# Patient Record
Sex: FEMALE | Race: WHITE | NOT HISPANIC OR LATINO | Employment: OTHER | URBAN - NONMETROPOLITAN AREA
[De-identification: names, ages, dates, MRNs, and addresses within clinical notes are randomized per-mention and may not be internally consistent; named-entity substitution may affect disease eponyms.]

---

## 2018-07-06 ENCOUNTER — OFFICE VISIT (OUTPATIENT)
Dept: UROLOGY | Facility: CLINIC | Age: 73
End: 2018-07-06

## 2018-07-06 VITALS — WEIGHT: 179.2 LBS | TEMPERATURE: 98.7 F | HEIGHT: 65 IN | BODY MASS INDEX: 29.85 KG/M2

## 2018-07-06 DIAGNOSIS — R31.0 GROSS HEMATURIA: Primary | ICD-10-CM

## 2018-07-06 DIAGNOSIS — E27.8 ADRENAL MASS, RIGHT (HCC): ICD-10-CM

## 2018-07-06 LAB
BILIRUB BLD-MCNC: NEGATIVE MG/DL
CLARITY, POC: CLEAR
COLOR UR: YELLOW
GLUCOSE UR STRIP-MCNC: NEGATIVE MG/DL
KETONES UR QL: NEGATIVE
LEUKOCYTE EST, POC: NEGATIVE
NITRITE UR-MCNC: NEGATIVE MG/ML
PH UR: 6 [PH] (ref 5–8)
PROT UR STRIP-MCNC: NEGATIVE MG/DL
RBC # UR STRIP: NEGATIVE /UL
SP GR UR: 1.02 (ref 1–1.03)
UROBILINOGEN UR QL: NORMAL

## 2018-07-06 PROCEDURE — 88112 CYTOPATH CELL ENHANCE TECH: CPT | Performed by: UROLOGY

## 2018-07-06 PROCEDURE — 87186 SC STD MICRODIL/AGAR DIL: CPT | Performed by: UROLOGY

## 2018-07-06 PROCEDURE — 81001 URINALYSIS AUTO W/SCOPE: CPT | Performed by: UROLOGY

## 2018-07-06 PROCEDURE — 99204 OFFICE O/P NEW MOD 45 MIN: CPT | Performed by: UROLOGY

## 2018-07-06 PROCEDURE — 87077 CULTURE AEROBIC IDENTIFY: CPT | Performed by: UROLOGY

## 2018-07-06 PROCEDURE — 87086 URINE CULTURE/COLONY COUNT: CPT | Performed by: UROLOGY

## 2018-07-06 RX ORDER — ESOMEPRAZOLE MAGNESIUM 40 MG/1
40 CAPSULE, DELAYED RELEASE ORAL
COMMUNITY
Start: 2012-01-04 | End: 2019-09-05 | Stop reason: SDUPTHER

## 2018-07-06 RX ORDER — ASPIRIN 81 MG/1
81 TABLET ORAL
COMMUNITY
End: 2021-10-12 | Stop reason: SINTOL

## 2018-07-06 RX ORDER — METOPROLOL SUCCINATE 100 MG/1
50 TABLET, EXTENDED RELEASE ORAL NIGHTLY
COMMUNITY

## 2018-07-06 RX ORDER — LOSARTAN POTASSIUM 50 MG/1
50 TABLET ORAL DAILY
Refills: 1 | COMMUNITY
Start: 2018-05-07

## 2018-07-06 RX ORDER — METFORMIN HYDROCHLORIDE 500 MG/1
500 TABLET, EXTENDED RELEASE ORAL DAILY
Refills: 3 | Status: ON HOLD | COMMUNITY
Start: 2018-06-14 | End: 2021-09-28

## 2018-07-06 RX ORDER — GLIPIZIDE 5 MG/1
5 TABLET, FILM COATED, EXTENDED RELEASE ORAL DAILY
Refills: 2 | COMMUNITY
Start: 2018-05-07

## 2018-07-06 NOTE — PROGRESS NOTES
Ms. Clayton is 73 y.o. female    Chief Complaint   Patient presents with   • Blood in Urine     x2 weeks       Blood in Urine   This is a new problem. The current episode started 1 to 4 weeks ago. The problem has been resolved since onset. She describes the hematuria as gross hematuria. She reports no clotting in her urine stream. She is experiencing no pain. She describes her urine color as dark red. Irritative symptoms do not include frequency or urgency. Pertinent negatives include no abdominal pain, chills, dysuria, fever or flank pain.       The following portions of the patient's history were reviewed and updated as appropriate: allergies, current medications, past family history, past medical history, past social history, past surgical history and problem list.    Review of Systems   Constitutional: Negative for chills and fever.   Gastrointestinal: Negative for abdominal pain, anal bleeding and blood in stool.   Genitourinary: Negative for difficulty urinating, dysuria, flank pain, frequency, hematuria and urgency.         Current Outpatient Prescriptions:   •  Cetirizine HCl 10 MG capsule, Take  by mouth., Disp: , Rfl:   •  Coenzyme Q10 10 MG capsule, Take  by mouth., Disp: , Rfl:   •  esomeprazole (nexIUM) 40 MG capsule, Take 40 mg by mouth., Disp: , Rfl:   •  glipiZIDE (GLUCOTROL XL) 5 MG ER tablet, Take 5 mg by mouth Daily., Disp: , Rfl: 2  •  losartan (COZAAR) 50 MG tablet, Take 50 mg by mouth Daily., Disp: , Rfl: 1  •  metoprolol succinate XL (TOPROL XL) 100 MG 24 hr tablet, Take  by mouth., Disp: , Rfl:   •  MULTIPLE VITAMIN PO, Take  by mouth., Disp: , Rfl:   •  aspirin 81 MG EC tablet, Take 81 mg by mouth., Disp: , Rfl:   •  metFORMIN ER (GLUCOPHAGE-XR) 500 MG 24 hr tablet, Take 500 mg by mouth Daily., Disp: , Rfl: 3    Past Medical History:   Diagnosis Date   • GERD (gastroesophageal reflux disease)    • Hx of excision of tumor of brain meninges    • Hypertension        Past Surgical History:  "  Procedure Laterality Date   • BRAIN TUMOR EXCISION     • CHOLECYSTECTOMY     • HYSTERECTOMY     • TOTAL KNEE ARTHROPLASTY Bilateral        Social History     Social History   • Marital status:      Social History Main Topics   • Smoking status: Never Smoker   • Smokeless tobacco: Never Used   • Alcohol use No   • Drug use: No   • Sexual activity: Defer     Other Topics Concern   • Not on file       Family History   Problem Relation Age of Onset   • Cancer Neg Hx        Objective    Temp 98.7 °F (37.1 °C)   Ht 165.1 cm (65\")   Wt 81.3 kg (179 lb 3.2 oz)   BMI 29.82 kg/m²     Physical Exam  Constitutional: Well nourished, Well developed; No apparent distress  Psychiatric: Appropriate affect; Alert and oriented  Eyes: Unremarkable  Musculoskeletal: Normal gait and station  GI: Abdomen is soft, non-tender  Respiratory: No distress; Unlabored movement; No accessory musculature needed with symmetric movements  Skin: No pallor or diaphoresis  : Labia normal; Urethral meatus normal position, not stenotic; No significant bladder prolapse.     Hospital Outpatient Visit on 07/02/2015   Component Date Value Ref Range Status   • Protime 07/02/2015 21.0* 10.0 - 13.8 Seconds Final   • INR 07/02/2015 1.8* 0.91 - 1.09 Final   • Protime 07/09/2015 17.4* 10.0 - 13.8 Seconds Final   • INR 07/09/2015 1.5* 0.91 - 1.09 Final       Results for orders placed or performed in visit on 07/06/18   POC Urinalysis Dipstick, Multipro   Result Value Ref Range    Color Yellow Yellow, Straw, Dark Yellow, Ana    Clarity, UA Clear Clear    Glucose, UA Negative Negative, 1000 mg/dL (3+) mg/dL    Bilirubin Negative Negative    Ketones, UA Negative Negative    Specific Gravity  1.020 1.005 - 1.030    Blood, UA Negative Negative    pH, Urine 6.0 5.0 - 8.0    Protein, POC Negative Negative mg/dL    Urobilinogen, UA Normal Normal    Nitrite, UA Negative Negative    Leukocytes Negative Negative     Assessment and Plan    Pam was seen today " for blood in urine.    Diagnoses and all orders for this visit:    Gross hematuria  -     POC Urinalysis Dipstick, Multipro  -     Cystoscopy  -     NON-GYNECOLOGIC CYTOLOGY  -     Urine Culture - Urine, Urine, Clean Catch    Adrenal mass, right (CMS/HCC)  -     Metanephrines, Frac. Free, Plasma  -     Cortisol - AM  -     Aldosterone    Patient with an episode of gross hematuria.  Patient states that she did go to urgent care was unable to give them a urine sample was started on antibiotics.  It is possible that this is secondary to infection, but with this episode of gross hematuria she does eat a full hematuria workup.  CT scan reviewed as below.  Plan for cystoscopy.  Urine culture and cytology sent today.    On her CT scan there is an incidental finding of a right 2 cm adrenal mass.  This likely represents an adenoma.  I will give functional studies today and plan on getting a repeat CT scan in 3-6 months.  She would prefer to do this in 3 months as she will be out of town for the 6 months following that.    Cystoscopy as the definitive lower urinary tract study is discussed . The risks of pain and discomfort, infection, and urethral stricture are discussed with the patient including the technique used in the office setting.  All patient questions were answered.     CT independent reivew    The CT scan of the abdomen/pelvis done with and without contrast is available for me to review.  Treatment recommendations require an independent review.  First I scanned the liver, spleen, and bowel pattern.  The retroperitoneum including the major vessels and lymphatic packages are briefly reviewed.  This film as been reviewed by the radiologist to determine any non urologic abnormalities that are present.  The kidneys are closely inspected for size, symmetry, contour, parenchymal thickness, perinephric reaction, presence of calcifications, and intrarenal dilation of the collecting system.  The ureters are inspected for  their course, caliber, and any calcifications.  The bladder is inspected for its thickness, size, and presence of any calcifications.  This scan shows:    The right kidney appears normal on this contrasted CT scan.  The renal parenchymal is norml in thickness.  There are no solid masses or cysts.  There is no hydronephrosis.  There are no stones.  2 cm right adrenal nodule    The left kidney appears normal on this contrasted CT scan.  The renal parenchymal is norml in thickness.  There are no solid masses or cysts.  There is no hydronephrosis.  There are no stones.      The bladder appears normal on this non-contrasted CT scan.  The bladder appears normal in thickness.  There no masses or stones seen on this exam.

## 2018-07-08 LAB — BACTERIA SPEC AEROBE CULT: ABNORMAL

## 2018-07-09 ENCOUNTER — TELEPHONE (OUTPATIENT)
Dept: UROLOGY | Facility: CLINIC | Age: 73
End: 2018-07-09

## 2018-07-09 DIAGNOSIS — N39.0 URINARY TRACT INFECTION WITHOUT HEMATURIA, SITE UNSPECIFIED: Primary | ICD-10-CM

## 2018-07-09 RX ORDER — CEFDINIR 300 MG/1
300 CAPSULE ORAL 2 TIMES DAILY
Qty: 20 CAPSULE | Refills: 0 | Status: SHIPPED | OUTPATIENT
Start: 2018-07-09 | End: 2018-07-19

## 2018-07-09 NOTE — TELEPHONE ENCOUNTER
----- Message from Rj Tijerina MD sent at 7/8/2018  7:14 AM CDT -----  Positive urine culture.  Please start Omnicef 300 mg twice a day for 10 days

## 2018-07-09 NOTE — TELEPHONE ENCOUNTER
Called and informed patient of positive urine culture. I faxed in her prescription. Pt confirmed understanding that she needed to start that today.

## 2018-07-10 ENCOUNTER — PROCEDURE VISIT (OUTPATIENT)
Dept: UROLOGY | Facility: CLINIC | Age: 73
End: 2018-07-10

## 2018-07-10 DIAGNOSIS — E27.8 ADRENAL MASS, RIGHT (HCC): ICD-10-CM

## 2018-07-10 DIAGNOSIS — N39.0 URINARY TRACT INFECTION WITHOUT HEMATURIA, SITE UNSPECIFIED: Primary | ICD-10-CM

## 2018-07-10 LAB
BILIRUB BLD-MCNC: NEGATIVE MG/DL
CLARITY, POC: CLEAR
COLOR UR: YELLOW
GLUCOSE UR STRIP-MCNC: NEGATIVE MG/DL
KETONES UR QL: NEGATIVE
LEUKOCYTE EST, POC: ABNORMAL
NITRITE UR-MCNC: NEGATIVE MG/ML
PH UR: 6.5 [PH] (ref 5–8)
PROT UR STRIP-MCNC: NEGATIVE MG/DL
RBC # UR STRIP: ABNORMAL /UL
SP GR UR: 1.02 (ref 1–1.03)
UROBILINOGEN UR QL: NORMAL

## 2018-07-10 PROCEDURE — 52000 CYSTOURETHROSCOPY: CPT | Performed by: UROLOGY

## 2018-07-10 PROCEDURE — 81002 URINALYSIS NONAUTO W/O SCOPE: CPT | Performed by: UROLOGY

## 2018-07-10 NOTE — PROGRESS NOTES
Pre- operative diagnosis:  Hematuria    Post operative diagnosis:  Same    Procedure:  The patient was prepped and draped in a normal sterile fashion.  The urethra was anesthetized with 2% lidocaine jelly.  A rigid cystoscope was introduced per urethra.  The urethra is normal in appearance without obstruction or mass.  The bladder is without evidence of mucosal lesion.   There is no abnormality of the urothelium.  There is minimal trabeculation of the detrusor muscle.  The ureteral orifices are relatively normal in position and they efflux clear urine.    Patient tolerated the procedure well    Complications: none    Blood loss: minimal    Follow up:    Cystoscopy negative for cause of her hematuria.  This is likely related to a urinary tract infection.  Patient does have a right adrenal nodule measured at 2 cm, likely a benign adenoma.  I will ordered functional studies and these are not back yet today and I will call her with the results.  With regards to the nodule, she does need repeat scan all do this for her in a few months.

## 2018-07-12 ENCOUNTER — TELEPHONE (OUTPATIENT)
Dept: UROLOGY | Facility: CLINIC | Age: 73
End: 2018-07-12

## 2018-07-12 LAB
CYTO UR: NORMAL
LAB AP CASE REPORT: NORMAL
PATH REPORT.FINAL DX SPEC: NORMAL
PATH REPORT.GROSS SPEC: NORMAL

## 2018-07-12 RX ORDER — SODIUM PHOSPHATE,MONO-DIBASIC 19G-7G/118
500 ENEMA (ML) RECTAL 2 TIMES DAILY
COMMUNITY
End: 2019-09-05 | Stop reason: SDUPTHER

## 2018-07-12 NOTE — TELEPHONE ENCOUNTER
Pt came in today and it was noted in the pts chart that she takes Metformin. The pt was here on 7/11/18 for an appt and was needing to have a CT scan. I  Asked if the pt took Metformin and the pt stated that she does not. Today however, the pt told Rosemarie Arias she was restarting her Metformin. Per the Hematuria Protocol the pts visit that was scheduled on yesterday would need to be changed. I called the pt and she verbally confirmed that she will be stopping her Metformin on Friday 7/13/18 prior to the CT Scan. Pt stressed that after her upcoming appt she will be leaving to go out of town for 6 months and we could not reschedule her. I explained to the pt that as long as she is coming off of her Metformin , then I would not need to reschedule her appt. Pt reaffirmed that she will not be taking Metformin any longer and voiced understanding.

## 2018-07-16 LAB
ALDOST SERPL-MCNC: 1.4 NG/DL (ref 0–30)
CORTIS AM PEAK SERPL-MCNC: 11 UG/DL (ref 6.2–19.4)
METANEPH FREE SERPL-MCNC: 43 PG/ML (ref 0–62)
NORMETANEPHRINE SERPL-MCNC: 129 PG/ML (ref 0–145)

## 2018-07-17 ENCOUNTER — TELEPHONE (OUTPATIENT)
Dept: UROLOGY | Facility: CLINIC | Age: 73
End: 2018-07-17

## 2018-07-17 NOTE — TELEPHONE ENCOUNTER
Dr. Tijerina will be back in the office tomro. I have to wait until he releases the labs first and we will call her tomro with the results.

## 2018-07-18 ENCOUNTER — PREP FOR SURGERY (OUTPATIENT)
Dept: OTHER | Facility: HOSPITAL | Age: 73
End: 2018-07-18

## 2018-07-18 DIAGNOSIS — R89.6 ABNORMAL CYTOLOGY: Primary | ICD-10-CM

## 2018-07-18 RX ORDER — SODIUM CHLORIDE 0.9 % (FLUSH) 0.9 %
1-10 SYRINGE (ML) INJECTION AS NEEDED
Status: CANCELLED | OUTPATIENT
Start: 2018-07-18 | End: 2019-07-18

## 2018-07-23 ENCOUNTER — APPOINTMENT (OUTPATIENT)
Dept: PREADMISSION TESTING | Facility: HOSPITAL | Age: 73
End: 2018-07-23

## 2018-07-23 VITALS
WEIGHT: 176.15 LBS | DIASTOLIC BLOOD PRESSURE: 90 MMHG | RESPIRATION RATE: 18 BRPM | HEART RATE: 79 BPM | SYSTOLIC BLOOD PRESSURE: 141 MMHG | HEIGHT: 66 IN | BODY MASS INDEX: 28.31 KG/M2 | OXYGEN SATURATION: 97 %

## 2018-07-23 LAB
ANION GAP SERPL CALCULATED.3IONS-SCNC: 15 MMOL/L (ref 4–13)
BILIRUB UR QL STRIP: NEGATIVE
BUN BLD-MCNC: 11 MG/DL (ref 5–21)
BUN/CREAT SERPL: 16.2 (ref 7–25)
CALCIUM SPEC-SCNC: 10 MG/DL (ref 8.4–10.4)
CHLORIDE SERPL-SCNC: 104 MMOL/L (ref 98–110)
CLARITY UR: CLEAR
CO2 SERPL-SCNC: 25 MMOL/L (ref 24–31)
COLOR UR: YELLOW
CREAT BLD-MCNC: 0.68 MG/DL (ref 0.5–1.4)
DEPRECATED RDW RBC AUTO: 39.8 FL (ref 40–54)
ERYTHROCYTE [DISTWIDTH] IN BLOOD BY AUTOMATED COUNT: 13 % (ref 12–15)
GFR SERPL CREATININE-BSD FRML MDRD: 85 ML/MIN/1.73
GLUCOSE BLD-MCNC: 158 MG/DL (ref 70–100)
GLUCOSE UR STRIP-MCNC: NEGATIVE MG/DL
HCT VFR BLD AUTO: 43.1 % (ref 37–47)
HGB BLD-MCNC: 14.2 G/DL (ref 12–16)
HGB UR QL STRIP.AUTO: NEGATIVE
KETONES UR QL STRIP: NEGATIVE
LEUKOCYTE ESTERASE UR QL STRIP.AUTO: NEGATIVE
MCH RBC QN AUTO: 28.2 PG (ref 28–32)
MCHC RBC AUTO-ENTMCNC: 32.9 G/DL (ref 33–36)
MCV RBC AUTO: 85.5 FL (ref 82–98)
NITRITE UR QL STRIP: NEGATIVE
PH UR STRIP.AUTO: 5.5 [PH] (ref 5–8)
PLATELET # BLD AUTO: 269 10*3/MM3 (ref 130–400)
PMV BLD AUTO: 12.3 FL (ref 6–12)
POTASSIUM BLD-SCNC: 4.1 MMOL/L (ref 3.5–5.3)
PROT UR QL STRIP: NEGATIVE
RBC # BLD AUTO: 5.04 10*6/MM3 (ref 4.2–5.4)
SODIUM BLD-SCNC: 144 MMOL/L (ref 135–145)
SP GR UR STRIP: 1.02 (ref 1–1.03)
UROBILINOGEN UR QL STRIP: NORMAL
WBC NRBC COR # BLD: 10.24 10*3/MM3 (ref 4.8–10.8)

## 2018-07-23 PROCEDURE — 36415 COLL VENOUS BLD VENIPUNCTURE: CPT

## 2018-07-23 PROCEDURE — 93010 ELECTROCARDIOGRAM REPORT: CPT | Performed by: INTERNAL MEDICINE

## 2018-07-23 PROCEDURE — 81003 URINALYSIS AUTO W/O SCOPE: CPT | Performed by: UROLOGY

## 2018-07-23 PROCEDURE — 93005 ELECTROCARDIOGRAM TRACING: CPT

## 2018-07-23 PROCEDURE — 85027 COMPLETE CBC AUTOMATED: CPT | Performed by: UROLOGY

## 2018-07-23 PROCEDURE — 80048 BASIC METABOLIC PNL TOTAL CA: CPT | Performed by: UROLOGY

## 2018-07-23 NOTE — DISCHARGE INSTRUCTIONS
DAY OF SURGERY INSTRUCTIONS        YOUR SURGEON: MAGALY KIRBY    PROCEDURE: CYSTOSCOPY BLADDER BIOPSY, BILATERAL RETROGRADE PYELOGRAM AND URETEROSCOPY    DATE OF SURGERY: July 25, 2018    ARRIVAL TIME: AS DIRECTED BY OFFICE    DAY OF SURGERY TAKE ONLY THESE MEDICATIONS UNLESS OTHERWISE INSTRUCTED BY YOUR PHYSICIAN: DO NOT TAKE ANY MEDICATIONS THE MORNING OF YOUR SURGERY.    (PATIENT STATES SHE TAKES HER METOPROLOL AT NIGHT.)          MANAGING PAIN AFTER SURGERY    We know you are probably wondering what your pain will be like after surgery.  Following surgery it is unrealistic to expect you will not have pain.   Pain is how our bodies let us know that something is wrong or cautions us to be careful.  That said, our goal is to make your pain tolerable.    Methods we may use to treat your pain include (oral or IV medications, PCAs, epidurals, nerve blocks, etc.)   While some procedures require IV pain medications for a short time after surgery, transitioning to pain medications by mouth allows for better management of pain.   Your nurse will encourage you to take oral pain medications whenever possible.  IV medications work almost immediately, but only last a short while.  Taking medications by mouth allows for a more constant level of medication in your blood stream for a longer period of time.      Once your pain is out of control it is harder to get back under control.  It is important you are aware when your next dose of pain medication is due.  If you are admitted, your nurse may write the time of your next dose on the white board in your room to help you remember.      We are interested in your pain and encourage you to inform us about aggravating factors during your visit.   Many times a simple repositioning every few hours can make a big difference.    If your physician says it is okay, do not let your pain prevent you from getting out of bed. Be sure to call your nurse for assistance prior to getting up so  you do not fall.      Before surgery, please decide your tolerable pain goal.  These faces help describe the pain ratings we use on a 0-10 scale.   Be prepared to tell us your goal and whether or not you take pain or anxiety medications at home.            BEFORE YOU COME TO THE HOSPITAL  (Pre-op instructions)  • Do not eat, drink, smoke or chew gum after midnight the night before surgery.  This also includes no mints.  • Morning of surgery take only the medicines you have been instructed with a sip of water unless otherwise instructed  by your physician.  • Do not shave, wear makeup or dark nail polish.  • Remove all jewelry including rings.  • Leave anything you consider valuable at home.  • Leave your suitcase in the car until after your surgery.  • Bring the following with you if applicable:  o Picture ID and insurance, Medicare or Medicaid cards  o Co-pay/deductible required by insurance (cash, check, credit card)  o Copy of advance directive, living will or power-of- documents if not brought to PAT  o CPAP or BIPAP mask and tubing  o Relaxation aids (MP3 player, book, magazine)  • On the day of surgery check in at registration located at the main entrance of the hospital.       Outpatient Surgery Guidelines, Adult  Outpatient procedures are those for which the person having the procedure is allowed to go home the same day as the procedure. Various procedures are done on an outpatient basis. You should follow some general guidelines if you will be having an outpatient procedure.  LET YOUR HEALTH CARE PROVIDER KNOW ABOUT:  · Any allergies you have.  · All medicines you are taking, including vitamins, herbs, eye drops, creams, and over-the-counter medicines.  · Previous problems you or members of your family have had with the use of anesthetics.  · Any blood disorders you have.  · Previous surgeries you have had.  · Medical conditions you have.  RISKS AND COMPLICATIONS  Your health care provider will  discuss possible risks and complications with you before surgery. Common risks and complications include:    · Problems due to the use of anesthetics.  · Blood loss and replacement (does not apply to minor surgical procedures).  · Temporary increase in pain due to surgery.  · Uncorrected pain or problems that the surgery was meant to correct.  · Infection.  · New damage.  BEFORE THE PROCEDURE  · Ask your health care provider about changing or stopping your regular medicines. You may need to stop taking certain medicines in the days or weeks before the procedure.  · Stop smoking at least 2 weeks before surgery. This lowers your risk for complications during and after surgery. Ask your health care provider for help with this if needed.  · Eat your usual meals and a light supper the day before surgery. Continue fluid intake. Do not drink alcohol.  · Do not eat or drink after midnight the night before your surgery.   · Arrange for someone to take you home and to stay with you for 24 hours after the procedure. Medicine given for your procedure may affect your ability to drive or to care for yourself.  · Call your health care provider's office if you develop an illness or problem that may prevent you from safely having your procedure.  AFTER THE PROCEDURE  After surgery, you will be taken to a recovery area, where your progress will be monitored. If there are no complications, you will be allowed to go home when you are awake, stable, and taking fluids well. You may have numbness around the surgical site. Healing will take some time. You will have tenderness at the surgical site and may have some swelling and bruising. You may also have some nausea.  HOME CARE INSTRUCTIONS  · Do not drive for 24 hours, or as directed by your health care provider. Do not drive while taking prescription pain medicines.  · Do not drink alcohol for 24 hours.  · Do not make important decisions or sign legal documents for 24 hours.  · You may  resume a normal diet and activities as directed.  · Do not lift anything heavier than 10 pounds (4.5 kg) or play contact sports until your health care provider says it is okay.  · Change your bandages (dressings) as directed.  · Only take over-the-counter or prescription medicines as directed by your health care provider.  · Follow up with your health care provider as directed.  SEEK MEDICAL CARE IF:  · You have increased bleeding (more than a small spot) from the surgical site.  · You have redness, swelling, or increasing pain in the wound.  · You see pus coming from the wound.  · You have a fever.  · You notice a bad smell coming from the wound or dressing.  · You feel lightheaded or faint.  · You develop a rash.  · You have trouble breathing.  · You develop allergies.  MAKE SURE YOU:  · Understand these instructions.  · Will watch your condition.  · Will get help right away if you are not doing well or get worse.     This information is not intended to replace advice given to you by your health care provider. Make sure you discuss any questions you have with your health care provider.     Document Released: 09/12/2002 Document Revised: 05/03/2016 Document Reviewed: 05/22/2014  [employer] Interactive Patient Education ©2016 [employer]       Fall Prevention in Hospitals, Adult  As a hospital patient, your condition and the treatments you receive can increase your risk for falls. Some additional risk factors for falls in a hospital include:  · Being in an unfamiliar environment.  · Being on bed rest.  · Your surgery.  · Taking certain medicines.  · Your tubing requirements, such as intravenous (IV) therapy or catheters.  It is important that you learn how to decrease fall risks while at the hospital. Below are important tips that can help prevent falls.  SAFETY TIPS FOR PREVENTING FALLS  Talk about your risk of falling.  · Ask your health care provider why you are at risk for falling. Is it your medicine, illness,  tubing placement, or something else?  · Make a plan with your health care provider to keep you safe from falls.  · Ask your health care provider or pharmacist about side effects of your medicines. Some medicines can make you dizzy or affect your coordination.  Ask for help.  · Ask for help before getting out of bed. You may need to press your call button.  · Ask for assistance in getting safely to the toilet.  · Ask for a walker or cane to be put at your bedside. Ask that most of the side rails on your bed be placed up before your health care provider leaves the room.  · Ask family or friends to sit with you.  · Ask for things that are out of your reach, such as your glasses, hearing aids, telephone, bedside table, or call button.  Follow these tips to avoid falling:  · Stay lying or seated, rather than standing, while waiting for help.  · Wear rubber-soled slippers or shoes whenever you walk in the hospital.  · Avoid quick, sudden movements.  ¨ Change positions slowly.  ¨ Sit on the side of your bed before standing.  ¨ Stand up slowly and wait before you start to walk.  · Let your health care provider know if there is a spill on the floor.  · Pay careful attention to the medical equipment, electrical cords, and tubes around you.  · When you need help, use your call button by your bed or in the bathroom. Wait for one of your health care providers to help you.  · If you feel dizzy or unsure of your footing, return to bed and wait for assistance.  · Avoid being distracted by the TV, telephone, or another person in your room.  · Do not lean or support yourself on rolling objects, such as IV poles or bedside tables.     This information is not intended to replace advice given to you by your health care provider. Make sure you discuss any questions you have with your health care provider.     Document Released: 12/15/2001 Document Revised: 01/08/2016 Document Reviewed: 08/25/2013  [employer] Interactive Patient Education  ©2016 [employer]       Surgical Site Infections FAQs  What is a Surgical Site Infection (SSI)?  A surgical site infection is an infection that occurs after surgery in the part of the body where the surgery took place. Most patients who have surgery do not develop an infection. However, infections develop in about 1 to 3 out of every 100 patients who have surgery.  Some of the common symptoms of a surgical site infection are:  · Redness and pain around the area where you had surgery  · Drainage of cloudy fluid from your surgical wound  · Fever  Can SSIs be treated?  Yes. Most surgical site infections can be treated with antibiotics. The antibiotic given to you depends on the bacteria (germs) causing the infection. Sometimes patients with SSIs also need another surgery to treat the infection.  What are some of the things that hospitals are doing to prevent SSIs?  To prevent SSIs, doctors, nurses, and other healthcare providers:  · Clean their hands and arms up to their elbows with an antiseptic agent just before the surgery.  · Clean their hands with soap and water or an alcohol-based hand rub before and after caring for each patient.  · May remove some of your hair immediately before your surgery using electric clippers if the hair is in the same area where the procedure will occur. They should not shave you with a razor.  · Wear special hair covers, masks, gowns, and gloves during surgery to keep the surgery area clean.  · Give you antibiotics before your surgery starts. In most cases, you should get antibiotics within 60 minutes before the surgery starts and the antibiotics should be stopped within 24 hours after surgery.  · Clean the skin at the site of your surgery with a special soap that kills germs.  What can I do to help prevent SSIs?  Before your surgery:  · Tell your doctor about other medical problems you may have. Health problems such as allergies, diabetes, and obesity could affect your surgery and  your treatment.  · Quit smoking. Patients who smoke get more infections. Talk to your doctor about how you can quit before your surgery.  · Do not shave near where you will have surgery. Shaving with a razor can irritate your skin and make it easier to develop an infection.  At the time of your surgery:  · Speak up if someone tries to shave you with a razor before surgery. Ask why you need to be shaved and talk with your surgeon if you have any concerns.  · Ask if you will get antibiotics before surgery.  After your surgery:  · Make sure that your healthcare providers clean their hands before examining you, either with soap and water or an alcohol-based hand rub.  · If you do not see your providers clean their hands, please ask them to do so.  · Family and friends who visit you should not touch the surgical wound or dressings.  · Family and friends should clean their hands with soap and water or an alcohol-based hand rub before and after visiting you. If you do not see them clean their hands, ask them to clean their hands.  What do I need to do when I go home from the hospital?  · Before you go home, your doctor or nurse should explain everything you need to know about taking care of your wound. Make sure you understand how to care for your wound before you leave the hospital.  · Always clean your hands before and after caring for your wound.  · Before you go home, make sure you know who to contact if you have questions or problems after you get home.  · If you have any symptoms of an infection, such as redness and pain at the surgery site, drainage, or fever, call your doctor immediately.  If you have additional questions, please ask your doctor or nurse.  Developed and co-sponsored by The Society for Healthcare Epidemiology of [place] (SHEA); Infectious Diseases Society of [place] (IDSA); American Hospital Association; Association for Professionals in Infection Control and Epidemiology (APIC); Centers for Disease  Control and Prevention (CDC); and The Joint Commission.     This information is not intended to replace advice given to you by your health care provider. Make sure you discuss any questions you have with your health care provider.     Document Released: 12/23/2014 Document Revised: 01/08/2016 Document Reviewed: 03/02/2016  [employer] Interactive Patient Education ©2016 [employer]     PATIENT/FAMILY/RESPONSIBLE PARTY VERBALIZES UNDERSTANDING OF ABOVE EDUCATION.  COPY OF PAIN SCALE GIVEN AND REVIEWED WITH VERBALIZED UNDERSTANDING.

## 2018-07-25 ENCOUNTER — HOSPITAL ENCOUNTER (OUTPATIENT)
Facility: HOSPITAL | Age: 73
Setting detail: HOSPITAL OUTPATIENT SURGERY
Discharge: HOME | End: 2018-07-25
Attending: UROLOGY | Admitting: UROLOGY

## 2018-07-25 ENCOUNTER — ANESTHESIA (OUTPATIENT)
Dept: PERIOP | Facility: HOSPITAL | Age: 73
End: 2018-07-25

## 2018-07-25 ENCOUNTER — ANESTHESIA EVENT (OUTPATIENT)
Dept: PERIOP | Facility: HOSPITAL | Age: 73
End: 2018-07-25

## 2018-07-25 ENCOUNTER — APPOINTMENT (OUTPATIENT)
Dept: GENERAL RADIOLOGY | Facility: HOSPITAL | Age: 73
End: 2018-07-25

## 2018-07-25 VITALS
HEART RATE: 65 BPM | OXYGEN SATURATION: 97 % | RESPIRATION RATE: 16 BRPM | SYSTOLIC BLOOD PRESSURE: 132 MMHG | DIASTOLIC BLOOD PRESSURE: 68 MMHG | TEMPERATURE: 98.1 F

## 2018-07-25 DIAGNOSIS — R89.6 ABNORMAL CYTOLOGY: ICD-10-CM

## 2018-07-25 LAB
GLUCOSE BLDC GLUCOMTR-MCNC: 165 MG/DL (ref 70–130)
GLUCOSE BLDC GLUCOMTR-MCNC: 171 MG/DL (ref 70–130)

## 2018-07-25 PROCEDURE — 88112 CYTOPATH CELL ENHANCE TECH: CPT | Performed by: UROLOGY

## 2018-07-25 PROCEDURE — 25010000002 PROPOFOL 10 MG/ML EMULSION: Performed by: NURSE ANESTHETIST, CERTIFIED REGISTERED

## 2018-07-25 PROCEDURE — 88305 TISSUE EXAM BY PATHOLOGIST: CPT | Performed by: UROLOGY

## 2018-07-25 PROCEDURE — 74420 UROGRAPHY RTRGR +-KUB: CPT

## 2018-07-25 PROCEDURE — C1758 CATHETER, URETERAL: HCPCS | Performed by: UROLOGY

## 2018-07-25 PROCEDURE — 52351 CYSTOURETERO & OR PYELOSCOPE: CPT | Performed by: UROLOGY

## 2018-07-25 PROCEDURE — 52214 CYSTOSCOPY AND TREATMENT: CPT | Performed by: UROLOGY

## 2018-07-25 PROCEDURE — 25010000002 IOPAMIDOL 61 % SOLUTION: Performed by: UROLOGY

## 2018-07-25 PROCEDURE — 25010000002 FENTANYL CITRATE (PF) 100 MCG/2ML SOLUTION: Performed by: NURSE ANESTHETIST, CERTIFIED REGISTERED

## 2018-07-25 PROCEDURE — 82962 GLUCOSE BLOOD TEST: CPT

## 2018-07-25 PROCEDURE — C1769 GUIDE WIRE: HCPCS | Performed by: UROLOGY

## 2018-07-25 PROCEDURE — 74420 UROGRAPHY RTRGR +-KUB: CPT | Performed by: UROLOGY

## 2018-07-25 RX ORDER — ONDANSETRON 2 MG/ML
4 INJECTION INTRAMUSCULAR; INTRAVENOUS ONCE AS NEEDED
Status: DISCONTINUED | OUTPATIENT
Start: 2018-07-25 | End: 2018-07-25 | Stop reason: HOSPADM

## 2018-07-25 RX ORDER — MIDAZOLAM HYDROCHLORIDE 1 MG/ML
2 INJECTION INTRAMUSCULAR; INTRAVENOUS
Status: DISCONTINUED | OUTPATIENT
Start: 2018-07-25 | End: 2018-07-25 | Stop reason: HOSPADM

## 2018-07-25 RX ORDER — METOCLOPRAMIDE HYDROCHLORIDE 5 MG/ML
5 INJECTION INTRAMUSCULAR; INTRAVENOUS
Status: DISCONTINUED | OUTPATIENT
Start: 2018-07-25 | End: 2018-07-25 | Stop reason: HOSPADM

## 2018-07-25 RX ORDER — SODIUM CHLORIDE 0.9 % (FLUSH) 0.9 %
1-10 SYRINGE (ML) INJECTION AS NEEDED
Status: DISCONTINUED | OUTPATIENT
Start: 2018-07-25 | End: 2018-07-25 | Stop reason: HOSPADM

## 2018-07-25 RX ORDER — MAGNESIUM HYDROXIDE 1200 MG/15ML
LIQUID ORAL AS NEEDED
Status: DISCONTINUED | OUTPATIENT
Start: 2018-07-25 | End: 2018-07-25 | Stop reason: HOSPADM

## 2018-07-25 RX ORDER — MEPERIDINE HYDROCHLORIDE 50 MG/ML
12.5 INJECTION INTRAMUSCULAR; INTRAVENOUS; SUBCUTANEOUS
Status: DISCONTINUED | OUTPATIENT
Start: 2018-07-25 | End: 2018-07-25 | Stop reason: HOSPADM

## 2018-07-25 RX ORDER — IBUPROFEN 600 MG/1
600 TABLET ORAL ONCE AS NEEDED
Status: DISCONTINUED | OUTPATIENT
Start: 2018-07-25 | End: 2018-07-25 | Stop reason: HOSPADM

## 2018-07-25 RX ORDER — FENTANYL CITRATE 50 UG/ML
25 INJECTION, SOLUTION INTRAMUSCULAR; INTRAVENOUS AS NEEDED
Status: DISCONTINUED | OUTPATIENT
Start: 2018-07-25 | End: 2018-07-25 | Stop reason: HOSPADM

## 2018-07-25 RX ORDER — PHENAZOPYRIDINE HYDROCHLORIDE 100 MG/1
100 TABLET, FILM COATED ORAL 3 TIMES DAILY PRN
Qty: 12 TABLET | Refills: 0 | Status: SHIPPED | OUTPATIENT
Start: 2018-07-25 | End: 2018-10-24

## 2018-07-25 RX ORDER — LABETALOL HYDROCHLORIDE 5 MG/ML
5 INJECTION, SOLUTION INTRAVENOUS
Status: DISCONTINUED | OUTPATIENT
Start: 2018-07-25 | End: 2018-07-25 | Stop reason: HOSPADM

## 2018-07-25 RX ORDER — OXYCODONE AND ACETAMINOPHEN 7.5; 325 MG/1; MG/1
2 TABLET ORAL ONCE AS NEEDED
Status: DISCONTINUED | OUTPATIENT
Start: 2018-07-25 | End: 2018-07-25 | Stop reason: HOSPADM

## 2018-07-25 RX ORDER — FENTANYL CITRATE 50 UG/ML
INJECTION, SOLUTION INTRAMUSCULAR; INTRAVENOUS AS NEEDED
Status: DISCONTINUED | OUTPATIENT
Start: 2018-07-25 | End: 2018-07-25 | Stop reason: SURG

## 2018-07-25 RX ORDER — SODIUM CHLORIDE, SODIUM LACTATE, POTASSIUM CHLORIDE, CALCIUM CHLORIDE 600; 310; 30; 20 MG/100ML; MG/100ML; MG/100ML; MG/100ML
1000 INJECTION, SOLUTION INTRAVENOUS CONTINUOUS
Status: DISCONTINUED | OUTPATIENT
Start: 2018-07-25 | End: 2018-07-25 | Stop reason: HOSPADM

## 2018-07-25 RX ORDER — OXYCODONE HYDROCHLORIDE AND ACETAMINOPHEN 5; 325 MG/1; MG/1
1 TABLET ORAL ONCE AS NEEDED
Status: DISCONTINUED | OUTPATIENT
Start: 2018-07-25 | End: 2018-07-25 | Stop reason: HOSPADM

## 2018-07-25 RX ORDER — SODIUM CHLORIDE, SODIUM LACTATE, POTASSIUM CHLORIDE, CALCIUM CHLORIDE 600; 310; 30; 20 MG/100ML; MG/100ML; MG/100ML; MG/100ML
100 INJECTION, SOLUTION INTRAVENOUS CONTINUOUS
Status: DISCONTINUED | OUTPATIENT
Start: 2018-07-25 | End: 2018-07-25 | Stop reason: HOSPADM

## 2018-07-25 RX ORDER — NITROFURANTOIN 25; 75 MG/1; MG/1
100 CAPSULE ORAL 2 TIMES DAILY
Qty: 6 CAPSULE | Refills: 0 | Status: SHIPPED | OUTPATIENT
Start: 2018-07-25 | End: 2018-07-28

## 2018-07-25 RX ORDER — FAMOTIDINE 10 MG/ML
20 INJECTION, SOLUTION INTRAVENOUS
Status: DISCONTINUED | OUTPATIENT
Start: 2018-07-25 | End: 2018-07-25 | Stop reason: HOSPADM

## 2018-07-25 RX ORDER — MIDAZOLAM HYDROCHLORIDE 1 MG/ML
1 INJECTION INTRAMUSCULAR; INTRAVENOUS
Status: DISCONTINUED | OUTPATIENT
Start: 2018-07-25 | End: 2018-07-25 | Stop reason: HOSPADM

## 2018-07-25 RX ORDER — LIDOCAINE HYDROCHLORIDE 20 MG/ML
INJECTION, SOLUTION INFILTRATION; PERINEURAL AS NEEDED
Status: DISCONTINUED | OUTPATIENT
Start: 2018-07-25 | End: 2018-07-25 | Stop reason: SURG

## 2018-07-25 RX ORDER — OXYCODONE AND ACETAMINOPHEN 10; 325 MG/1; MG/1
1 TABLET ORAL ONCE AS NEEDED
Status: DISCONTINUED | OUTPATIENT
Start: 2018-07-25 | End: 2018-07-25 | Stop reason: HOSPADM

## 2018-07-25 RX ORDER — NALOXONE HCL 0.4 MG/ML
0.4 VIAL (ML) INJECTION AS NEEDED
Status: DISCONTINUED | OUTPATIENT
Start: 2018-07-25 | End: 2018-07-25 | Stop reason: HOSPADM

## 2018-07-25 RX ORDER — HYDROCODONE BITARTRATE AND ACETAMINOPHEN 5; 325 MG/1; MG/1
1 TABLET ORAL EVERY 6 HOURS PRN
Qty: 12 TABLET | Refills: 0 | Status: SHIPPED | OUTPATIENT
Start: 2018-07-25 | End: 2018-10-24

## 2018-07-25 RX ORDER — PROPOFOL 10 MG/ML
VIAL (ML) INTRAVENOUS AS NEEDED
Status: DISCONTINUED | OUTPATIENT
Start: 2018-07-25 | End: 2018-07-25 | Stop reason: SURG

## 2018-07-25 RX ORDER — SORBITOL 30 G/1000ML
IRRIGANT IRRIGATION AS NEEDED
Status: DISCONTINUED | OUTPATIENT
Start: 2018-07-25 | End: 2018-07-25 | Stop reason: HOSPADM

## 2018-07-25 RX ORDER — ACETAMINOPHEN 500 MG
1000 TABLET ORAL ONCE
Status: COMPLETED | OUTPATIENT
Start: 2018-07-25 | End: 2018-07-25

## 2018-07-25 RX ORDER — IPRATROPIUM BROMIDE AND ALBUTEROL SULFATE 2.5; .5 MG/3ML; MG/3ML
3 SOLUTION RESPIRATORY (INHALATION) ONCE AS NEEDED
Status: DISCONTINUED | OUTPATIENT
Start: 2018-07-25 | End: 2018-07-25 | Stop reason: HOSPADM

## 2018-07-25 RX ORDER — SODIUM CHLORIDE 0.9 % (FLUSH) 0.9 %
3 SYRINGE (ML) INJECTION AS NEEDED
Status: DISCONTINUED | OUTPATIENT
Start: 2018-07-25 | End: 2018-07-25 | Stop reason: HOSPADM

## 2018-07-25 RX ADMIN — SODIUM CHLORIDE, POTASSIUM CHLORIDE, SODIUM LACTATE AND CALCIUM CHLORIDE: 600; 310; 30; 20 INJECTION, SOLUTION INTRAVENOUS at 07:24

## 2018-07-25 RX ADMIN — LIDOCAINE HYDROCHLORIDE 0.5 ML: 10 INJECTION, SOLUTION EPIDURAL; INFILTRATION; INTRACAUDAL; PERINEURAL at 05:53

## 2018-07-25 RX ADMIN — FAMOTIDINE 20 MG: 10 INJECTION, SOLUTION INTRAVENOUS at 07:02

## 2018-07-25 RX ADMIN — PROPOFOL 200 MG: 10 INJECTION, EMULSION INTRAVENOUS at 07:27

## 2018-07-25 RX ADMIN — SODIUM CHLORIDE, POTASSIUM CHLORIDE, SODIUM LACTATE AND CALCIUM CHLORIDE 1000 ML: 600; 310; 30; 20 INJECTION, SOLUTION INTRAVENOUS at 05:59

## 2018-07-25 RX ADMIN — ACETAMINOPHEN 1000 MG: 500 TABLET, FILM COATED ORAL at 07:02

## 2018-07-25 RX ADMIN — Medication 2 G: at 07:24

## 2018-07-25 RX ADMIN — LIDOCAINE HYDROCHLORIDE 100 MG: 20 INJECTION, SOLUTION INFILTRATION; PERINEURAL at 07:27

## 2018-07-25 RX ADMIN — FENTANYL CITRATE 100 MCG: 50 INJECTION, SOLUTION INTRAMUSCULAR; INTRAVENOUS at 07:27

## 2018-07-25 NOTE — ANESTHESIA POSTPROCEDURE EVALUATION
Patient: Pam Clayton    Procedure Summary     Date:  07/25/18 Room / Location:   [facility]  /  [facility]    Anesthesia Start:  0724 Anesthesia Stop:  0820    Procedure:  CYSTOSCOPY BILATERAL RETROGRADE PYELOGRAM BLADDER BIOPSIES WITH FULGURATION (N/A Bladder) Diagnosis:       Abnormal cytology      (Abnormal cytology [R89.6])    Surgeon:  Rj Tijerina MD Provider:  Edmar Panchal CRNA    Anesthesia Type:  general ASA Status:  2          Anesthesia Type: general  Last vitals  BP   143/74 (07/25/18 0535)   Temp   98.3 °F (36.8 °C) (07/25/18 0535)   Pulse   74 (07/25/18 0645)   Resp   18 (07/25/18 0645)     SpO2   100 % (07/25/18 0645)     Anesthesia Post Evaluation

## 2018-07-25 NOTE — ANESTHESIA PROCEDURE NOTES
Airway  Urgency: elective    Airway not difficult    General Information and Staff    Patient location during procedure: OR  CRNA: BENITO SERRANO    Indications and Patient Condition  Indications for airway management: airway protection    Preoxygenated: yes  MILS maintained throughout  Mask difficulty assessment: 1 - vent by mask    Final Airway Details  Final airway type: supraglottic airway      Successful airway: [place]  Size 4  Airway Seal Pressure (cm H2O): 10    Number of attempts at approach: 1

## 2018-07-25 NOTE — DISCHARGE INSTRUCTIONS

## 2018-07-25 NOTE — OP NOTE
CYSTOSCOPY BLADDER BIOPSY WITH FULGURATION  Procedure Note    Pam Clayton  7/25/2018    Pre-op Diagnosis:   Abnormal cytology [R89.6]    Post-op Diagnosis:     Post-Op Diagnosis Codes:     * Abnormal cytology [R89.6]    Procedure/CPT® Codes:      Procedure(s):  CYSTOSCOPY BILATERAL RETROGRADE PYELOGRAM BLADDER BIOPSIES WITH FULGURATION ×6, bilateral ureteral catheterization with cytology wash    Surgeon(s):  Rj Tijerina MD    Anesthesia: General    Staff:   Circulator: Breann Barrera RN  Scrub Person: Carlos Masterson; Kodi Fisher    Indications for procedure:  Patient with history of gross hematuria and positive cytology with normal imaging and normal office cystoscopy.    Procedure details:  Patient identified in preoperative anesthesia care unit.  She was brought back to the operating theater and after induction of general anesthetic a proper timeout was performed.  After all were in agreement of patient procedure, patient was prepped and draped in normal sterile fashion.  A 22 Vincentian cystoscope was introduced per urethra.  Cystoscopy did not reveal any obvious lesions within the bladder.  A bladder cytology was sent at this point.  I began by intubating the left ureteral orifice with a sensor wire and over this placed a 5 Vincentian open ureteral catheter.  A retrograde pyelogram was performed which we dictated later portions procedure.  I then advanced the wire up to level kidney and advanced the ureteral catheter up to the level of the renal pelvis.  I then washed the renal pelvis with saline and took a sample the rigid drip method.  Once again a adequate sample the open-ended ureteral catheters removed.  This opening ureteral catheter and the wire were then disposed of and a fresh wire in open ureteral catheter was then used to intubate the right ureteral orifice.  A retrograde pyelogram was performed which will again be dictated later portions procedure.  A 5 Vincentian open ureteral catheter was  then advanced over the wire up to level the renal pelvis.  Again renal washings were done using sterile saline.  This was done in the same method.  At this point the ureteral catheter was removed and a be taken to take random bladder biopsies.  This is done from the trigone, posterior bladder wall, right and left bladder wall, dome, anterior bladder.  These were sent separately.  I then fulgurated the areas.  Bladder was filled and emptied multiple times no evidence of any bleeding.  This point the procedure was ended.    Left retrograde pyelogram read: Contrast injected per open-end ureteral catheter which showed a normal caliber distal mid and proximal ureter with no filling defects in the kidney.    Right retrograde pyelogram read: Contrast injected per open-ended ureteral catheter.  This showed no filling defects in the distal mid or proximal ureter.  Appeared to be no filling defects in the kidney itself.     Estimated Blood Loss: minimal    Specimens:                ID Type Source Tests Collected by Time   A : URINE CYTOLOGY FROM BLADDER Urine Urinary Bladder CYTOLOGY, URINE Rj Tijerina MD 7/25/2018 0745   B : Urine cytology left renal pelvis Urine Kidney, Left CYTOLOGY, URINE Rj Tijerina MD 7/25/2018 0749   C : Urine cytology right renal pelvis Urine Kidney, Right CYTOLOGY, URINE Rj Tijerina MD 7/25/2018 0751   D : trigone Tissue Urinary Bladder TISSUE PATHOLOGY EXAM Rj Tijerina MD 7/25/2018 0759   E : posterior bladder wall Tissue Urinary Bladder TISSUE PATHOLOGY EXAM Rj Tijerina MD 7/25/2018 0801   F : Left lateral bladder wall Tissue Urinary Bladder TISSUE PATHOLOGY EXAM Rj Tijerina MD 7/25/2018 0802   G : Right lateral bladder wall Tissue Urinary Bladder TISSUE PATHOLOGY EXAM Rj Tijerina MD 7/25/2018 0803   H : Anterior bladder wall Tissue Urinary Bladder TISSUE PATHOLOGY EXAM Rj Tijerina MD 7/25/2018 0804   I : Dome Tissue Urinary Bladder  TISSUE PATHOLOGY EXAM Rj Tijerina MD 7/25/2018 0805         Drains:      Complications: none    Follow up:   Next week for pathology review    Rj Tijerina MD     Date: 7/25/2018  Time: 8:32 AM

## 2018-07-25 NOTE — ANESTHESIA PREPROCEDURE EVALUATION
Anesthesia Evaluation     Patient summary reviewed and Nursing notes reviewed   no history of anesthetic complications (mother with hepatitis from haloethane, pt reports this as an allergy):  NPO Solid Status: > 8 hours  NPO Liquid Status: > 8 hours           Airway   Mallampati: III  TM distance: >3 FB  Neck ROM: full  Possible difficult intubation  Dental      Pulmonary     breath sounds clear to auscultation  (-) sleep apnea, not a smoker  Cardiovascular   Exercise tolerance: good (4-7 METS)    ECG reviewed  Patient on routine beta blocker and Beta blocker given within 24 hours of surgery  Rhythm: regular  Rate: normal    (+) hypertension,       Neuro/Psych  (-) seizures, TIA, CVA  GI/Hepatic/Renal/Endo    (+)  GERD,  diabetes mellitus type 2,   (-) liver disease, no renal disease (blood in urine)    ROS Comment: Adrenal mass 22 mm, normal metanephrines    Musculoskeletal     Abdominal    Substance History      OB/GYN          Other            Phys Exam Other: Previously 2 attempts, gr 3 with mac 3, used rodgers              Anesthesia Plan    ASA 2     general     intravenous induction   Anesthetic plan and risks discussed with patient.

## 2018-07-30 LAB
CYTO UR: NORMAL
LAB AP CASE REPORT: NORMAL
LAB AP CASE REPORT: NORMAL
LAB AP NON-GYN INTERPRETATION: NORMAL
PATH REPORT.FINAL DX SPEC: NORMAL
PATH REPORT.FINAL DX SPEC: NORMAL
PATH REPORT.GROSS SPEC: NORMAL
PATH REPORT.GROSS SPEC: NORMAL

## 2018-08-02 ENCOUNTER — OFFICE VISIT (OUTPATIENT)
Dept: UROLOGY | Facility: CLINIC | Age: 73
End: 2018-08-02

## 2018-08-02 VITALS — HEIGHT: 66 IN | WEIGHT: 175 LBS | TEMPERATURE: 97.8 F | BODY MASS INDEX: 28.12 KG/M2

## 2018-08-02 DIAGNOSIS — D09.0 CIS (CARCINOMA IN SITU OF BLADDER): Primary | ICD-10-CM

## 2018-08-02 DIAGNOSIS — E27.8 ADRENAL MASS, RIGHT (HCC): ICD-10-CM

## 2018-08-02 PROCEDURE — 99214 OFFICE O/P EST MOD 30 MIN: CPT | Performed by: UROLOGY

## 2018-08-02 NOTE — PROGRESS NOTES
Ms. Clayton is 73 y.o. 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 This is a new diagnois diagnosis.. The patient was initially diagnosed 1 week(s) ago. Severity is best is explained as carcinoma in situ. Previous management includes TURBT. Symptoms include no hematuria, dysuria or flank pain.  Last upper tract imaging was CT urogram done approximately  2  week(s) ago.             The following portions of the patient's history were reviewed and updated as appropriate: allergies, current medications, past family history, past medical history, past social history, past surgical history and problem list.    Review of Systems   Constitutional: Negative for chills and fever.   Gastrointestinal: Negative for abdominal pain, anal bleeding and blood in stool.   Genitourinary: Negative for decreased urine volume, difficulty urinating, dyspareunia, dysuria, enuresis, flank pain, frequency, genital sores, hematuria, menstrual problem, pelvic pain, urgency, vaginal bleeding, vaginal discharge and vaginal pain.         Current Outpatient Prescriptions:   •  aspirin 81 MG EC tablet, Take 81 mg by mouth., Disp: , Rfl:   •  Cetirizine HCl 10 MG capsule, Take  by mouth., Disp: , Rfl:   •  Coenzyme Q10 10 MG capsule, Take  by mouth., Disp: , Rfl:   •  esomeprazole (nexIUM) 40 MG capsule, Take 40 mg by mouth., Disp: , Rfl:   •  glipiZIDE (GLUCOTROL XL) 5 MG ER tablet, Take 5 mg by mouth Daily., Disp: , Rfl: 2  •  glucosamine sulfate 500 MG capsule capsule, Take 500 mg by mouth 2 (Two) Times a Day., Disp: , Rfl:   •  HYDROcodone-acetaminophen (NORCO) 5-325 MG per tablet, Take 1 tablet by mouth Every 6 (Six) Hours As Needed for Moderate Pain ., Disp: 12 tablet, Rfl: 0  •  losartan (COZAAR) 50 MG tablet, Take 50 mg by mouth Daily., Disp: , Rfl: 1  •  metFORMIN ER (GLUCOPHAGE-XR) 500 MG 24 hr tablet,  "Take 500 mg by mouth Daily., Disp: , Rfl: 3  •  metoprolol succinate XL (TOPROL XL) 100 MG 24 hr tablet, Take  by mouth Every Night., Disp: , Rfl:   •  MULTIPLE VITAMIN PO, Take  by mouth., Disp: , Rfl:   •  phenazopyridine (PYRIDIUM) 100 MG tablet, Take 1 tablet by mouth 3 (Three) Times a Day As Needed (dysuria)., Disp: 12 tablet, Rfl: 0  •  Probiotic Product (PRO-BIOTIC BLEND PO), Take 1 capsule by mouth Daily., Disp: , Rfl:     Past Medical History:   Diagnosis Date   • Diabetes mellitus (CMS/HCC)    • GERD (gastroesophageal reflux disease)    • Hx of excision of tumor of brain meninges    • Hypertension        Past Surgical History:   Procedure Laterality Date   • APPENDECTOMY     • BRAIN TUMOR EXCISION     • CHOLECYSTECTOMY     • CYSTOSCOPY BLADDER BIOPSY N/A 7/25/2018    Procedure: CYSTOSCOPY BILATERAL RETROGRADE PYELOGRAM BLADDER BIOPSIES WITH FULGURATION;  Surgeon: Rj Tijerina MD;  Location: [facility] OR;  Service: Urology   • HYSTERECTOMY     • TOTAL KNEE ARTHROPLASTY Bilateral        Social History     Social History   • Marital status:      Social History Main Topics   • Smoking status: Never Smoker   • Smokeless tobacco: Never Used   • Alcohol use No   • Drug use: No   • Sexual activity: Defer     Other Topics Concern   • Not on file       Family History   Problem Relation Age of Onset   • No Known Problems Father    • No Known Problems Mother    • Cancer Neg Hx        Objective    Temp 97.8 °F (36.6 °C)   Ht 167.6 cm (66\")   Wt 79.4 kg (175 lb)   BMI 28.25 kg/m²     Physical Exam    Admission on 07/25/2018, Discharged on 07/25/2018   Component Date Value Ref Range Status   • Glucose 07/25/2018 165* 70 - 130 mg/dL Final    : [id number] Lor Dutton ID: [id number]   • Case Report 07/25/2018    Final                    Value:Non-gynecologic Cytology                          Case: [id number]                                  Authorizing Provider:  Rj Tijerina MD     " Collected:           07/25/2018 07:45 AM          Ordering Location:     [facility] OR  Received:            07/25/2018 08:59 AM          Pathologist:           Lakia Coates MD                                                          Specimens:   1) - Urinary Bladder, URINE CYTOLOGY FROM BLADDER                                                   2) - Kidney, Left, Urine cytology left renal pelvis                                                 3) - Kidney, Right, Urine cytology right renal pelvis                                     • Final Diagnosis 07/25/2018    Final                    Value:This result contains rich text formatting which cannot be displayed here.   • Interpretation 07/25/2018 See final diagnosis   Final   • Gross Description 07/25/2018    Final                    Value:This result contains rich text formatting which cannot be displayed here.   • Case Report 07/25/2018    Final                    Value:Surgical Pathology Report                         Case: [id number]                                  Authorizing Provider:  Rj Tijerina MD     Collected:           07/25/2018 07:59 AM          Ordering Location:     [facility] OR  Received:            07/25/2018 09:22 AM          Pathologist:           Lakia Coates MD                                                          Specimens:   1) - Urinary Bladder, trigone                                                                       2) - Urinary Bladder, posterior bladder wall                                                        3) - Urinary Bladder, Left lateral bladder wall                                                     4) - Urinary Bladder, Right lateral bladder wall                                                    5) - Urinary Bladder, Anterior bladder wall                                                         6) - Urinary Bladder, Dome                                                                 • Final Diagnosis 07/25/2018    Final                    Value:This result contains rich text formatting which cannot be displayed here.   • Gross Description 07/25/2018    Final                    Value:This result contains rich text formatting which cannot be displayed here.   • Microscopic Description 07/25/2018    Final                    Value:This result contains rich text formatting which cannot be displayed here.   • Glucose 07/25/2018 171* 70 - 130 mg/dL Final    : [id number] Irvin Rankin  SMeter ID: [id number]       Results for orders placed or performed during the hospital encounter of 07/25/18   POC Glucose Once   Result Value Ref Range    Glucose 165 (H) 70 - 130 mg/dL   POC Glucose Once   Result Value Ref Range    Glucose 171 (H) 70 - 130 mg/dL   Cytology, Urine   Result Value Ref Range    Case Report       Non-gynecologic Cytology                          Case: [id number]                                  Authorizing Provider:  Rj Tijerina MD     Collected:           07/25/2018 07:45 AM          Ordering Location:     [facility] OR  Received:            07/25/2018 08:59 AM          Pathologist:           Lakia Coates MD                                                          Specimens:   1) - Urinary Bladder, URINE CYTOLOGY FROM BLADDER                                                   2) - Kidney, Left, Urine cytology left renal pelvis                                                 3) - Kidney, Right, Urine cytology right renal pelvis                                      Final Diagnosis       1.  Bladder, washings:  Positive for high-grade urothelial carcinoma.    2.  Left renal pelvis, washings:  Negative for malignancy.  Abundant clusters of reactive urothelium.    3.  Right renal pelvis, washings:  Negative for malignancy.  Clusters of reactive urothelium.        Interpretation See final diagnosis     Gross Description       1. Received fresh in the laboratory in a  container labeled with patient name and  designated as urine from bladder.    70 mls of yellow fluid.    1 thin prep slide made.    2. Received fresh in the laboratory in a container labeled with patient name and  designated as urine from left renal   pelvis.     10 mls of pink fluid.     1 thin prep slide made.    3. Received fresh in the laboratory in a container labeled with patient name and  designated as urine from right renal pelvis.     5 mls of clear fluid.     1 thin prep slide made.       Tissue Pathology Exam   Result Value Ref Range    Case Report       Surgical Pathology Report                         Case: [id number]                                  Authorizing Provider:  Rj Tijerina MD     Collected:           2018 07:59 AM          Ordering Location:     [facility] OR  Received:            2018 09:22 AM          Pathologist:           Lakia Coates MD                                                          Specimens:   1) - Urinary Bladder, trigone                                                                       2) - Urinary Bladder, posterior bladder wall                                                        3) - Urinary Bladder, Left lateral bladder wall                                                     4) - Urinary Bladder, Right lateral bladder wall                                                    5) - Urinary Bladder, Anterior bladder wall                                                         6) - Urinary Bladder, Dome                                                                 Final Diagnosis       1.  Bladder, trigone, biopsy:  Benign bladder mucosa.  Cystitis cystica.   Negative for malignancy.    2.  Bladder, posterior wall, biopsy:  Benign bladder mucosa.  Cystitis cystica and von Brunn's nests.  Negative for malignancy.    3.  Bladder, left lateral wall, biopsy:  Benign bladder mucosa.  Negative for malignancy.    4.  Bladder,  right lateral wall, biopsy:  Fragment of benign soft tissue.  Epithelium is not present for evaluation.    5.  Bladder, anterior wall, biopsy:  Carcinoma in situ.  Negative for lamina propria invasion.  Detrusor muscle is present for evaluation.    6.  Bladder,Dome, biopsy:  Carcinoma in situ.  Negative for lamina propria invasion.  Detrusor muscle is present for evaluation.    AJCC Stage: pTis      Gross Description       Six specimens are received in formalin and labeled with the patient's name and date of birth.    Specimen 1 is further designated as urinary bladder trigone and consists of one white-tan soft tissue fragment measuring 0.3 x 0.3 with 0.2 cm.  The entire specimen is submitted into cassette 1A.    Specimen 2 is further designated as posterior bladder wall and consists of one white soft tissue fragment measuring 0.4 x 0.3 x 0.2 cm.  The entire specimen is entirely submitted into cassette 2A.    Specimen 3 is further designated as left lateral bladder wall and consists of one white soft tissue fragment measuring 0.4 x 0.3 x 0.2 cm.  The entire specimen is entirely submitted into cassette 3A.    Specimen 4 is further designated as right lateral bladder wall and consists of one white soft tissue fragment measuring 0.4 x 0.2 x 0.1 cm.  The entire specimen is entirely submitted into cassette 4A.    Specimen 5 is further designated as anterior  bladder wall and consists of one white soft tissue fragment measuring 0.4 x 0.2 x 0.2 cm.  The entire specimen is entirely submitted into cassette 5A.    Specimen 6 is further designated as dome and consists of one white soft tissue fragment measuring 0.4 x 0.2 x 0.2 cm.  The entire specimen is entirely submitted into cassette 6A.        Microscopic Description       1.  Histologic sections demonstrate benign bladder mucosa with cystitis cystica.  There is no evidence of malignancy.    2.  Histologic sections demonstrate a superficial fragment of benign bladder  mucosa demonstrating cystitis cystica and von Markus nests.  There is no evidence of malignancy.    3.  Histologic sections demonstrate a fragment of benign bladder mucosa.  The epithelium demonstrates the normal 6-8 layers with an intact umbrella layer.  There is no evidence of malignancy.    4.  Histologic sections demonstrate a fragment of benign soft tissue.  Urothelium is not present for evaluation.    5 and 6.  Histologic sections demonstrate urothelium demonstrate a flat lesion composed of full thickness large irregular hyperchromatic nuclei.  There is no evidence of invasion into the underlying lamina propria.  Detrusor muscle is present for evaluation.       Assessment and Plan    Pam was seen today for bladder cancer.    Diagnoses and all orders for this visit:    CIS (carcinoma in situ of bladder)    Adrenal mass, right (CMS/HCC)    New diagnosis of CIS the bladder.  She has had a negative CT scan for metastatic disease.  Today we discussed treatment options including a cholecystectomy, monitoring, BCG.  She is most interested in benefits associated with BCG.  She understands the risk of BCG including but not limited to bleeding, infection, lower urinary tract symptoms, dysuria urgency and frequency, systemic absorption and disseminated tuberculosis requiring long-term medications.  She also understands the possibility of recurrence of this bladder cancer.  She understands that there will be a need for random bladder biopsies after completion of her 6 week course.    She does have an adrenal nodule which at this point is being deemed a benign adenoma.  She has had negative functional studies and does need follow-up imaging to ensure that this lesion is not growing.

## 2018-08-03 ENCOUNTER — TELEPHONE (OUTPATIENT)
Dept: UROLOGY | Facility: CLINIC | Age: 73
End: 2018-08-03

## 2018-08-03 NOTE — TELEPHONE ENCOUNTER
Pt came in office and has questions that she would like some answers to.  She would like to know what Dr. Tijerina meant by rare and aggressive cancer. She would also like to know if the cancer has been caught early. Also, does he have any idea what may have caused it. Finally, what's the name of the cancer that she has? She would like a call back in regards to this.

## 2018-08-06 NOTE — TELEPHONE ENCOUNTER
I called and spoke with her about what the name of the cancer is. I told her she would have to wait until Dr. Tijerina is back before I can answer the rest of her questions. Pt confirmed understanding.

## 2018-08-13 NOTE — TELEPHONE ENCOUNTER
She will need to make an appointment with me to answer any questions.  Needs to be the last appointment of the day

## 2018-08-14 ENCOUNTER — PROCEDURE VISIT (OUTPATIENT)
Dept: UROLOGY | Facility: CLINIC | Age: 73
End: 2018-08-14

## 2018-08-14 DIAGNOSIS — D09.0 CIS (CARCINOMA IN SITU OF BLADDER): Primary | ICD-10-CM

## 2018-08-14 LAB
BILIRUB BLD-MCNC: NEGATIVE MG/DL
CLARITY, POC: CLEAR
COLOR UR: YELLOW
GLUCOSE UR STRIP-MCNC: NEGATIVE MG/DL
KETONES UR QL: NEGATIVE
LEUKOCYTE EST, POC: ABNORMAL
NITRITE UR-MCNC: NEGATIVE MG/ML
PH UR: 6 [PH] (ref 5–8)
PROT UR STRIP-MCNC: NEGATIVE MG/DL
RBC # UR STRIP: ABNORMAL /UL
SP GR UR: 1.02 (ref 1–1.03)
UROBILINOGEN UR QL: NORMAL

## 2018-08-14 PROCEDURE — 51720 TREATMENT OF BLADDER LESION: CPT | Performed by: UROLOGY

## 2018-08-14 PROCEDURE — 81003 URINALYSIS AUTO W/O SCOPE: CPT | Performed by: UROLOGY

## 2018-08-14 NOTE — PROGRESS NOTES
Patient of Dr. Tijerina states she is here for her 1 BCG treatment.     Treatment 1 of 6      UA was obtained and micro slide plated. Dr. Tijerina was consulted and after reviewing UA and slide authorized to go ahead with the BCG treatment. The patient denies any fever, chills, hematuria, N&V or suprapubic abdominal pain.      Procedure note: Using sterile technique a well lubricated 16 Serbian Red rubber is placed through the external urethral meatus and eventually manipulated into the bladder.  A small amount of clear urine returned.  One vial of Anais strain BCG is slowly instilled into the urinary bladder. Patient tolerated the treatment well with no complications. Patient was advised to call the office if she has any problems, questions or concerns. Patient verbalized understanding    Post procedural instructions have been given to the patient.     This procedure was performed by Vera COX. Dr. Tijerina was here in the office at the time of treatment.

## 2018-08-21 ENCOUNTER — PROCEDURE VISIT (OUTPATIENT)
Dept: UROLOGY | Facility: CLINIC | Age: 73
End: 2018-08-21

## 2018-08-21 DIAGNOSIS — D09.0 CIS (CARCINOMA IN SITU OF BLADDER): Primary | ICD-10-CM

## 2018-08-21 PROCEDURE — 81003 URINALYSIS AUTO W/O SCOPE: CPT | Performed by: UROLOGY

## 2018-08-21 PROCEDURE — 51720 TREATMENT OF BLADDER LESION: CPT | Performed by: UROLOGY

## 2018-08-21 NOTE — PROGRESS NOTES
Patient of Dr. Tijerina states she is here for her 2 BCG treatment.     Treatment 2 of 6      UA was obtained and micro slide plated. Dr. Tijerina was consulted and after reviewing UA and slide authorized to go ahead with the BCG treatment. The patient denies any fever, chills, hematuria, N&V or suprapubic abdominal pain.      Procedure note: Using sterile technique a well lubricated 16 English Red rubber is placed through the external urethral meatus and eventually manipulated into the bladder.  A small amount of clear urine returned.  One vial of Anais strain BCG is slowly instilled into the urinary bladder. Patient tolerated the treatment well with no complications. Patient was advised to call the office if she has any problems, questions or concerns. Patient verbalized understanding    Post procedural instructions have been given to the patient.     This procedure was performed by Vera COX. Dr. Tijerina was here in the office at the time of treatment.

## 2018-08-22 ENCOUNTER — OFFICE VISIT (OUTPATIENT)
Dept: UROLOGY | Facility: CLINIC | Age: 73
End: 2018-08-22

## 2018-08-22 VITALS — HEIGHT: 66 IN | WEIGHT: 175 LBS | BODY MASS INDEX: 28.12 KG/M2

## 2018-08-22 DIAGNOSIS — D09.0 CIS (CARCINOMA IN SITU OF BLADDER): Primary | ICD-10-CM

## 2018-08-22 PROCEDURE — 99214 OFFICE O/P EST MOD 30 MIN: CPT | Performed by: UROLOGY

## 2018-08-22 NOTE — PROGRESS NOTES
Ms. Clayton is 73 y.o. female    Chief Complaint   Patient presents with   • Bladder Cancer       History of Present Illness    The following portions of the patient's history were reviewed and updated as appropriate: allergies, current medications, past family history, past medical history, past social history, past surgical history and problem list.    Review of Systems   Constitutional: Negative for chills and fever.   Gastrointestinal: Negative for abdominal pain, anal bleeding and blood in stool.   Genitourinary: Positive for frequency. Negative for decreased urine volume, difficulty urinating, dyspareunia, dysuria, enuresis, flank pain, genital sores, hematuria, menstrual problem, pelvic pain, urgency, vaginal bleeding, vaginal discharge and vaginal pain.         Current Outpatient Prescriptions:   •  aspirin 81 MG EC tablet, Take 81 mg by mouth., Disp: , Rfl:   •  Cetirizine HCl 10 MG capsule, Take  by mouth., Disp: , Rfl:   •  Coenzyme Q10 10 MG capsule, Take  by mouth., Disp: , Rfl:   •  esomeprazole (nexIUM) 40 MG capsule, Take 40 mg by mouth., Disp: , Rfl:   •  glipiZIDE (GLUCOTROL XL) 5 MG ER tablet, Take 5 mg by mouth Daily., Disp: , Rfl: 2  •  glucosamine sulfate 500 MG capsule capsule, Take 500 mg by mouth 2 (Two) Times a Day., Disp: , Rfl:   •  HYDROcodone-acetaminophen (NORCO) 5-325 MG per tablet, Take 1 tablet by mouth Every 6 (Six) Hours As Needed for Moderate Pain ., Disp: 12 tablet, Rfl: 0  •  losartan (COZAAR) 50 MG tablet, Take 50 mg by mouth Daily., Disp: , Rfl: 1  •  metFORMIN ER (GLUCOPHAGE-XR) 500 MG 24 hr tablet, Take 500 mg by mouth Daily., Disp: , Rfl: 3  •  metoprolol succinate XL (TOPROL XL) 100 MG 24 hr tablet, Take  by mouth Every Night., Disp: , Rfl:   •  MULTIPLE VITAMIN PO, Take  by mouth., Disp: , Rfl:   •  phenazopyridine (PYRIDIUM) 100 MG tablet, Take 1 tablet by mouth 3 (Three) Times a Day As Needed (dysuria)., Disp: 12 tablet, Rfl: 0  •  Probiotic Product (PRO-BIOTIC BLEND  "PO), Take 1 capsule by mouth Daily., Disp: , Rfl:     Past Medical History:   Diagnosis Date   • Diabetes mellitus (CMS/HCC)    • GERD (gastroesophageal reflux disease)    • Hx of excision of tumor of brain meninges    • Hypertension        Past Surgical History:   Procedure Laterality Date   • APPENDECTOMY     • BRAIN TUMOR EXCISION     • CHOLECYSTECTOMY     • CYSTOSCOPY BLADDER BIOPSY N/A 7/25/2018    Procedure: CYSTOSCOPY BILATERAL RETROGRADE PYELOGRAM BLADDER BIOPSIES WITH FULGURATION;  Surgeon: Rj Tijerina MD;  Location: [facility] OR;  Service: Urology   • HYSTERECTOMY     • TOTAL KNEE ARTHROPLASTY Bilateral        Social History     Social History   • Marital status:      Social History Main Topics   • Smoking status: Never Smoker   • Smokeless tobacco: Never Used   • Alcohol use No   • Drug use: No   • Sexual activity: Defer     Other Topics Concern   • Not on file       Family History   Problem Relation Age of Onset   • No Known Problems Father    • No Known Problems Mother    • Cancer Neg Hx        Objective    Ht 167.6 cm (66\")   Wt 79.4 kg (175 lb)   BMI 28.25 kg/m²     Physical Exam    Procedure visit on 08/21/2018   Component Date Value Ref Range Status   • Color 08/21/2018 Yellow  Yellow, Straw, Dark Yellow, Ana Final   • Clarity, UA 08/21/2018 Clear  Clear Final   • Glucose, UA 08/21/2018 Negative  Negative, 1000 mg/dL (3+) mg/dL Final   • Bilirubin 08/21/2018 Negative  Negative Final   • Ketones, UA 08/21/2018 Negative  Negative Final   • Specific Gravity  08/21/2018 1.025  1.005 - 1.030 Final   • Blood, UA 08/21/2018 Trace* Negative Final   • pH, Urine 08/21/2018 6.0  5.0 - 8.0 Final   • Protein, POC 08/21/2018 Negative  Negative mg/dL Final   • Urobilinogen, UA 08/21/2018 Normal  Normal Final   • Nitrite, UA 08/21/2018 Negative  Negative Final   • Leukocytes 08/21/2018 Small (1+)* Negative Final       Results for orders placed or performed in visit on 08/21/18   POC Urinalysis " Dipstick, Multipro   Result Value Ref Range    Color Yellow Yellow, Straw, Dark Yellow, Ana    Clarity, UA Clear Clear    Glucose, UA Negative Negative, 1000 mg/dL (3+) mg/dL    Bilirubin Negative Negative    Ketones, UA Negative Negative    Specific Gravity  1.025 1.005 - 1.030    Blood, UA Trace (A) Negative    pH, Urine 6.0 5.0 - 8.0    Protein, POC Negative Negative mg/dL    Urobilinogen, UA Normal Normal    Nitrite, UA Negative Negative    Leukocytes Small (1+) (A) Negative     Assessment and Plan    Pam was seen today for bladder cancer.    Diagnoses and all orders for this visit:    CIS (carcinoma in situ of bladder)    Patient comes in today to further discuss both urge renal mass as well as the diagnosis of carcinoma in situ of the bladder.  The adrenal mass is an approximately 2 cm lesion which appears to be a benign adenoma.  She has had negative functional studies.  Plan on repeating a CT scan in October to assess for change in size.    In terms of her carcinoma in situ, she is currently getting BCG leading well.  She does not have several questions today regarding steps moving forward and follow-up care.  I believe I answered all their questions to their satisfaction.  I will see her back in October for her repeat scan.  I did discuss with her that she needs a repeat renal bladder biopsy 6 weeks after her BCG course is finished which will be somewhere in the first week of November.    Approximately 30 minutes were spent with this patient face-to-face with greater than 50% spent counseling.

## 2018-08-28 ENCOUNTER — PROCEDURE VISIT (OUTPATIENT)
Dept: UROLOGY | Facility: CLINIC | Age: 73
End: 2018-08-28

## 2018-08-28 DIAGNOSIS — D09.0 CIS (CARCINOMA IN SITU OF BLADDER): Primary | ICD-10-CM

## 2018-08-28 LAB
BILIRUB BLD-MCNC: NEGATIVE MG/DL
CLARITY, POC: CLEAR
COLOR UR: YELLOW
GLUCOSE UR STRIP-MCNC: NEGATIVE MG/DL
KETONES UR QL: NEGATIVE
LEUKOCYTE EST, POC: ABNORMAL
NITRITE UR-MCNC: NEGATIVE MG/ML
PH UR: 6.5 [PH] (ref 5–8)
PROT UR STRIP-MCNC: NEGATIVE MG/DL
RBC # UR STRIP: NEGATIVE /UL
SP GR UR: 1.02 (ref 1–1.03)
UROBILINOGEN UR QL: NORMAL

## 2018-08-28 PROCEDURE — 81003 URINALYSIS AUTO W/O SCOPE: CPT | Performed by: UROLOGY

## 2018-08-28 PROCEDURE — 51720 TREATMENT OF BLADDER LESION: CPT | Performed by: UROLOGY

## 2018-08-28 NOTE — PROGRESS NOTES
Patient of Dr. Tijerina states she is here for her 3rd BCG treatment.     Treatment 3 of 6      UA was obtained and micro slide plated. Dr. Tijerina was consulted and after reviewing UA and slide authorized to go ahead with the BCG treatment. The patient denies any fever, chills, hematuria, N&V or suprapubic abdominal pain.      Procedure note: Using sterile technique a well lubricated 16 [place] Red rubber is placed through the external urethral meatus and eventually manipulated into the bladder.  A small amount of clear urine returned.  One vial of Anais strain BCG is slowly instilled into the urinary bladder. Patient tolerated the treatment well with no complications. Patient was advised to call the office if she has any problems, questions or concerns. Patient verbalized understanding    Post procedural instructions have been given to the patient.     This procedure was performed by Vera COX. Dr. Tijerina was here in the office at the time of treatment.

## 2018-09-04 ENCOUNTER — PROCEDURE VISIT (OUTPATIENT)
Dept: UROLOGY | Facility: CLINIC | Age: 73
End: 2018-09-04

## 2018-09-04 DIAGNOSIS — D09.0 CIS (CARCINOMA IN SITU OF BLADDER): Primary | ICD-10-CM

## 2018-09-04 LAB
BILIRUB BLD-MCNC: NEGATIVE MG/DL
CLARITY, POC: CLEAR
COLOR UR: YELLOW
GLUCOSE UR STRIP-MCNC: NEGATIVE MG/DL
KETONES UR QL: NEGATIVE
LEUKOCYTE EST, POC: ABNORMAL
NITRITE UR-MCNC: NEGATIVE MG/ML
PH UR: 6 [PH] (ref 5–8)
PROT UR STRIP-MCNC: NEGATIVE MG/DL
RBC # UR STRIP: NEGATIVE /UL
SP GR UR: 1.02 (ref 1–1.03)
UROBILINOGEN UR QL: NORMAL

## 2018-09-04 PROCEDURE — 81003 URINALYSIS AUTO W/O SCOPE: CPT | Performed by: UROLOGY

## 2018-09-04 PROCEDURE — 51720 TREATMENT OF BLADDER LESION: CPT | Performed by: UROLOGY

## 2018-09-04 NOTE — PROGRESS NOTES
Patient of Dr. Tijerina states she is here for her 4th BCG treatment.     Treatment 4 of 6      UA was obtained and micro slide plated. Dr. Tijerina was consulted and after reviewing UA and slide authorized to go ahead with the BCG treatment. The patient denies any fever, chills, hematuria, N&V or suprapubic abdominal pain.      Procedure note: Using sterile technique a well lubricated 16 [place] Red rubber is placed through the external urethral meatus and eventually manipulated into the bladder.  A small amount of clear urine returned.  One vial of Anais strain BCG is slowly instilled into the urinary bladder. Patient tolerated the treatment well with no complications. Patient was advised to call the office if she has any problems, questions or concerns. Patient verbalized understanding    Post procedural instructions have been given to the patient.     This procedure was performed by Jeniffer COX. Dr. Tijerina was here in the office at the time of treatment.

## 2018-09-11 ENCOUNTER — PROCEDURE VISIT (OUTPATIENT)
Dept: UROLOGY | Facility: CLINIC | Age: 73
End: 2018-09-11

## 2018-09-11 DIAGNOSIS — D09.0 CIS (CARCINOMA IN SITU OF BLADDER): Primary | ICD-10-CM

## 2018-09-11 PROCEDURE — 51720 TREATMENT OF BLADDER LESION: CPT | Performed by: UROLOGY

## 2018-09-11 PROCEDURE — 81003 URINALYSIS AUTO W/O SCOPE: CPT | Performed by: UROLOGY

## 2018-09-11 NOTE — PROGRESS NOTES
Patient of Dr. Tijerina  states she is here for her 5th  BCG treatment.     Treatment 5 of 6      UA was obtained and micro slide plated. Dr. Tijerina  was consulted and after reviewing UA and slide authorized to go ahead with the BCG treatment. The patient denies any fever, chills, hematuria, N&V or suprapubic abdominal pain.      Procedure note: Using sterile technique a well lubricated 16 [place] Red rubber is placed through the external urethral meatus and eventually manipulated into the bladder.  A small amount of clear urine returned.  One vial of [place] strain BCG is slowly instilled into the urinary bladder. Patient tolerated the treatment well with no complications. Patient was advised to call the office if she has any problems, questions or concerns. Patient verbalized understanding    Post procedural instructions have been given to the patient.     This procedure was performed by Emily COX. Dr. Tijerina  was here in the office at the time of treatment.        Reviewed and Agree with above.

## 2018-09-18 ENCOUNTER — PROCEDURE VISIT (OUTPATIENT)
Dept: UROLOGY | Facility: CLINIC | Age: 73
End: 2018-09-18

## 2018-09-18 DIAGNOSIS — D09.0 CIS (CARCINOMA IN SITU OF BLADDER): Primary | ICD-10-CM

## 2018-09-18 PROCEDURE — 81003 URINALYSIS AUTO W/O SCOPE: CPT | Performed by: UROLOGY

## 2018-09-18 PROCEDURE — 51720 TREATMENT OF BLADDER LESION: CPT | Performed by: UROLOGY

## 2018-09-18 NOTE — PROGRESS NOTES
Patient of Dr. Tijerina states she is here for her 6 BCG treatment.     Treatment 6 of 6      UA was obtained and micro slide plated. Dr. Tijerina was consulted and after reviewing UA and slide authorized to go ahead with the BCG treatment. The patient denies any fever, chills, hematuria, N&V or suprapubic abdominal pain.      Procedure note: Using sterile technique a well lubricated 16 [place] Red rubber is placed through the external urethral meatus and eventually manipulated into the bladder.  A small amount of clear urine returned.  One vial of Anais strain BCG is slowly instilled into the urinary bladder. Patient tolerated the treatment well with no complications. Patient was advised to call the office if she has any problems, questions or concerns. Patient verbalized understanding    Post procedural instructions have been given to the patient.     This procedure was performed by Vera COX. Dr. Tijerina was here in the office at the time of treatment.

## 2018-09-25 ENCOUNTER — TELEPHONE (OUTPATIENT)
Dept: UROLOGY | Facility: CLINIC | Age: 73
End: 2018-09-25

## 2018-09-25 NOTE — TELEPHONE ENCOUNTER
Pt and stated she has had bright red bleeding when urinating that will come and go, she is worried about this and would like to know if this is normal after finishing 1 round of 6 treatments for bladder cancer.

## 2018-09-25 NOTE — TELEPHONE ENCOUNTER
She has 6 rounds of BCG. I and Dr. Tijerina have explained to her that this is normal after a BCG. Bleeding, burning, frequency, and urgency is all normal after a BCG. If she run a fever then that would be cause of concern.

## 2018-10-02 ENCOUNTER — OFFICE VISIT (OUTPATIENT)
Dept: UROLOGY | Facility: CLINIC | Age: 73
End: 2018-10-02

## 2018-10-02 VITALS — TEMPERATURE: 98 F | HEIGHT: 66 IN | WEIGHT: 175 LBS | BODY MASS INDEX: 28.12 KG/M2

## 2018-10-02 DIAGNOSIS — R30.0 DYSURIA: ICD-10-CM

## 2018-10-02 DIAGNOSIS — D09.0 CIS (CARCINOMA IN SITU OF BLADDER): Primary | ICD-10-CM

## 2018-10-02 DIAGNOSIS — E27.8 ADRENAL MASS, RIGHT (HCC): ICD-10-CM

## 2018-10-02 LAB
BILIRUB BLD-MCNC: NEGATIVE MG/DL
CLARITY, POC: CLEAR
COLOR UR: YELLOW
GLUCOSE UR STRIP-MCNC: NEGATIVE MG/DL
KETONES UR QL: NEGATIVE
LEUKOCYTE EST, POC: NEGATIVE
NITRITE UR-MCNC: NEGATIVE MG/ML
PH UR: 5.5 [PH] (ref 5–8)
PROT UR STRIP-MCNC: NEGATIVE MG/DL
RBC # UR STRIP: ABNORMAL /UL
SP GR UR: 1.01 (ref 1–1.03)
UROBILINOGEN UR QL: NORMAL

## 2018-10-02 PROCEDURE — 99213 OFFICE O/P EST LOW 20 MIN: CPT | Performed by: UROLOGY

## 2018-10-02 PROCEDURE — 87186 SC STD MICRODIL/AGAR DIL: CPT | Performed by: UROLOGY

## 2018-10-02 PROCEDURE — 81001 URINALYSIS AUTO W/SCOPE: CPT | Performed by: UROLOGY

## 2018-10-02 PROCEDURE — 87086 URINE CULTURE/COLONY COUNT: CPT | Performed by: UROLOGY

## 2018-10-02 PROCEDURE — 87077 CULTURE AEROBIC IDENTIFY: CPT | Performed by: UROLOGY

## 2018-10-02 NOTE — PROGRESS NOTES
Ms. Clayton is 73 y.o. 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 This is a Previously diagnosed diagnosis.. The patient was initially diagnosed several month(s) ago. Severity is best is explained as carcinoma in situ. Previous management includes BCG intravesical therapy. Symptoms include irritative symptoms including frequency.           The following portions of the patient's history were reviewed and updated as appropriate: allergies, current medications, past family history, past medical history, past social history, past surgical history and problem list.    Review of Systems   Constitutional: Negative for chills and fever.   Gastrointestinal: Negative for abdominal pain, anal bleeding and blood in stool.   Genitourinary: Positive for hematuria. Negative for decreased urine volume, difficulty urinating, dyspareunia, dysuria, enuresis, flank pain, frequency, genital sores, menstrual problem, pelvic pain, urgency, vaginal bleeding, vaginal discharge and vaginal pain.         Current Outpatient Prescriptions:   •  aspirin 81 MG EC tablet, Take 81 mg by mouth., Disp: , Rfl:   •  Cetirizine HCl 10 MG capsule, Take  by mouth., Disp: , Rfl:   •  Coenzyme Q10 10 MG capsule, Take  by mouth., Disp: , Rfl:   •  esomeprazole (nexIUM) 40 MG capsule, Take 40 mg by mouth., Disp: , Rfl:   •  glipiZIDE (GLUCOTROL XL) 5 MG ER tablet, Take 5 mg by mouth Daily., Disp: , Rfl: 2  •  glucosamine sulfate 500 MG capsule capsule, Take 500 mg by mouth 2 (Two) Times a Day., Disp: , Rfl:   •  HYDROcodone-acetaminophen (NORCO) 5-325 MG per tablet, Take 1 tablet by mouth Every 6 (Six) Hours As Needed for Moderate Pain ., Disp: 12 tablet, Rfl: 0  •  losartan (COZAAR) 50 MG tablet, Take 50 mg by mouth Daily., Disp: , Rfl: 1  •  metFORMIN ER (GLUCOPHAGE-XR) 500 MG 24 hr tablet, Take 500 mg by mouth  "Daily., Disp: , Rfl: 3  •  metoprolol succinate XL (TOPROL XL) 100 MG 24 hr tablet, Take  by mouth Every Night., Disp: , Rfl:   •  MULTIPLE VITAMIN PO, Take  by mouth., Disp: , Rfl:   •  phenazopyridine (PYRIDIUM) 100 MG tablet, Take 1 tablet by mouth 3 (Three) Times a Day As Needed (dysuria)., Disp: 12 tablet, Rfl: 0  •  Probiotic Product (PRO-BIOTIC BLEND PO), Take 1 capsule by mouth Daily., Disp: , Rfl:     Past Medical History:   Diagnosis Date   • Diabetes mellitus (CMS/HCC)    • GERD (gastroesophageal reflux disease)    • Hx of excision of tumor of brain meninges    • Hypertension        Past Surgical History:   Procedure Laterality Date   • APPENDECTOMY     • BRAIN TUMOR EXCISION     • CHOLECYSTECTOMY     • CYSTOSCOPY BLADDER BIOPSY N/A 7/25/2018    Procedure: CYSTOSCOPY BILATERAL RETROGRADE PYELOGRAM BLADDER BIOPSIES WITH FULGURATION;  Surgeon: Rj Tijerina MD;  Location: [facility] OR;  Service: Urology   • HYSTERECTOMY     • TOTAL KNEE ARTHROPLASTY Bilateral        Social History     Social History   • Marital status:      Social History Main Topics   • Smoking status: Never Smoker   • Smokeless tobacco: Never Used   • Alcohol use No   • Drug use: No   • Sexual activity: Defer     Other Topics Concern   • Not on file       Family History   Problem Relation Age of Onset   • No Known Problems Father    • No Known Problems Mother    • Cancer Neg Hx        Objective    Temp 98 °F (36.7 °C)   Ht 167.6 cm (66\")   Wt 79.4 kg (175 lb)   BMI 28.25 kg/m²     Physical Exam    Procedure visit on 09/04/2018   Component Date Value Ref Range Status   • Color 09/04/2018 Yellow  Yellow, Straw, Dark Yellow, Ana Final   • Clarity, UA 09/04/2018 Clear  Clear Final   • Glucose, UA 09/04/2018 Negative  Negative, 1000 mg/dL (3+) mg/dL Final   • Bilirubin 09/04/2018 Negative  Negative Final   • Ketones, UA 09/04/2018 Negative  Negative Final   • Specific Gravity  09/04/2018 1.025  1.005 - 1.030 Final   • Blood, UA " 09/04/2018 Negative  Negative Final   • pH, Urine 09/04/2018 6.0  5.0 - 8.0 Final   • Protein, POC 09/04/2018 Negative  Negative mg/dL Final   • Urobilinogen, UA 09/04/2018 Normal  Normal Final   • Nitrite, UA 09/04/2018 Negative  Negative Final   • Leukocytes 09/04/2018 Trace* Negative Final       Results for orders placed or performed in visit on 10/02/18   POC Urinalysis Dipstick, Multipro   Result Value Ref Range    Color Yellow Yellow, Straw, Dark Yellow, Ana    Clarity, UA Clear Clear    Glucose, UA Negative Negative, 1000 mg/dL (3+) mg/dL    Bilirubin Negative Negative    Ketones, UA Negative Negative    Specific Gravity  1.015 1.005 - 1.030    Blood, UA Moderate (A) Negative    pH, Urine 5.5 5.0 - 8.0    Protein, POC Negative Negative mg/dL    Urobilinogen, UA Normal Normal    Nitrite, UA Negative Negative    Leukocytes Negative Negative     Assessment and Plan    Pam was seen today for bladder cancer.    Diagnoses and all orders for this visit:    CIS (carcinoma in situ of bladder)  -     POC Urinalysis Dipstick, Multipro  -     Case Request; Standing  -     ceFAZolin (ANCEF) 2 g in sodium chloride 0.9 % 100 mL IVPB; Infuse 2 g into a venous catheter 1 (One) Time.  -     Case Request    Adrenal mass, right (CMS/HCC)    Dysuria  -     Urine Culture - Urine, Urine, Clean Catch    Other orders  -     Follow Anesthesia Guidelines / Standing Orders; Future  -     Provide instructions to patient on NPO status  -     Obtain informed consent  -     Follow Anesthesia Guidelines / Standing Orders; Standing  -     Verify NPO Status; Standing    Patient with history of a right-sided adrenal mass.  This was functional study negative.  Likely a benign adenoma.  On repeat CT scan this lesion has unchanged in size and consistency.  Again likely a benign adenoma and we will repeat in 1 year.  She is having some dysuria today and I will send a culture.  This is likely secondary to inflammation from the BCG.  She does  have a history of CIS of the bladder and has recently completed her course of BCG.  I would like to do random bladder biopsies in the operating room approximately 6 weeks after the completion of her last BCG treatment.  She understands risk of this including but not limited to bleeding, infection, bladder perforation and need for further surgery

## 2018-10-04 ENCOUNTER — TELEPHONE (OUTPATIENT)
Dept: UROLOGY | Facility: CLINIC | Age: 73
End: 2018-10-04

## 2018-10-04 DIAGNOSIS — N39.0 URINARY TRACT INFECTION WITHOUT HEMATURIA, SITE UNSPECIFIED: Primary | ICD-10-CM

## 2018-10-04 LAB
BACTERIA SPEC AEROBE CULT: ABNORMAL
BACTERIA SPEC AEROBE CULT: ABNORMAL

## 2018-10-04 RX ORDER — CEFDINIR 300 MG/1
300 CAPSULE ORAL 2 TIMES DAILY
Qty: 10 CAPSULE | Refills: 0 | Status: SHIPPED | OUTPATIENT
Start: 2018-10-04 | End: 2018-10-09

## 2018-10-04 NOTE — TELEPHONE ENCOUNTER
Called and informed patient of positive urine culture. Faxed in script to [facility]. Pt confirmed that was okay.

## 2018-10-04 NOTE — TELEPHONE ENCOUNTER
----- Message from Rj Tijerina MD sent at 10/4/2018  7:09 AM CDT -----  Positive urine culture.  Please call in Omnicef 300 mg twice a day for 5 days

## 2018-10-24 ENCOUNTER — APPOINTMENT (OUTPATIENT)
Dept: PREADMISSION TESTING | Facility: HOSPITAL | Age: 73
End: 2018-10-24

## 2018-10-24 VITALS
WEIGHT: 172.84 LBS | DIASTOLIC BLOOD PRESSURE: 67 MMHG | HEART RATE: 82 BPM | SYSTOLIC BLOOD PRESSURE: 135 MMHG | HEIGHT: 67 IN | OXYGEN SATURATION: 98 % | BODY MASS INDEX: 27.13 KG/M2

## 2018-10-24 LAB
ANION GAP SERPL CALCULATED.3IONS-SCNC: 12 MMOL/L (ref 4–13)
BILIRUB UR QL STRIP: NEGATIVE
BUN BLD-MCNC: 16 MG/DL (ref 5–21)
BUN/CREAT SERPL: 22.9 (ref 7–25)
CALCIUM SPEC-SCNC: 10 MG/DL (ref 8.4–10.4)
CHLORIDE SERPL-SCNC: 104 MMOL/L (ref 98–110)
CLARITY UR: CLEAR
CO2 SERPL-SCNC: 25 MMOL/L (ref 24–31)
COLOR UR: YELLOW
CREAT BLD-MCNC: 0.7 MG/DL (ref 0.5–1.4)
DEPRECATED RDW RBC AUTO: 39.8 FL (ref 40–54)
ERYTHROCYTE [DISTWIDTH] IN BLOOD BY AUTOMATED COUNT: 12.8 % (ref 12–15)
GFR SERPL CREATININE-BSD FRML MDRD: 82 ML/MIN/1.73
GLUCOSE BLD-MCNC: 128 MG/DL (ref 70–100)
GLUCOSE UR STRIP-MCNC: NEGATIVE MG/DL
HCT VFR BLD AUTO: 41.5 % (ref 37–47)
HGB BLD-MCNC: 14.1 G/DL (ref 12–16)
HGB UR QL STRIP.AUTO: NEGATIVE
KETONES UR QL STRIP: NEGATIVE
LEUKOCYTE ESTERASE UR QL STRIP.AUTO: NEGATIVE
MCH RBC QN AUTO: 29.1 PG (ref 28–32)
MCHC RBC AUTO-ENTMCNC: 34 G/DL (ref 33–36)
MCV RBC AUTO: 85.6 FL (ref 82–98)
NITRITE UR QL STRIP: NEGATIVE
PH UR STRIP.AUTO: 6 [PH] (ref 5–8)
PLATELET # BLD AUTO: 273 10*3/MM3 (ref 130–400)
PMV BLD AUTO: 11.7 FL (ref 6–12)
POTASSIUM BLD-SCNC: 4 MMOL/L (ref 3.5–5.3)
PROT UR QL STRIP: NEGATIVE
RBC # BLD AUTO: 4.85 10*6/MM3 (ref 4.2–5.4)
SODIUM BLD-SCNC: 141 MMOL/L (ref 135–145)
SP GR UR STRIP: 1.02 (ref 1–1.03)
UROBILINOGEN UR QL STRIP: NORMAL
WBC NRBC COR # BLD: 12.6 10*3/MM3 (ref 4.8–10.8)

## 2018-10-24 PROCEDURE — 85027 COMPLETE CBC AUTOMATED: CPT | Performed by: UROLOGY

## 2018-10-24 PROCEDURE — 80048 BASIC METABOLIC PNL TOTAL CA: CPT | Performed by: UROLOGY

## 2018-10-24 PROCEDURE — 81003 URINALYSIS AUTO W/O SCOPE: CPT | Performed by: UROLOGY

## 2018-10-24 PROCEDURE — 36415 COLL VENOUS BLD VENIPUNCTURE: CPT

## 2018-10-24 RX ORDER — LACTOBACILLUS RHAMNOSUS GG 10B CELL
1 CAPSULE ORAL DAILY
COMMUNITY

## 2018-10-24 NOTE — DISCHARGE INSTRUCTIONS
DAY OF SURGERY INSTRUCTIONS    CYSTOSCOPY BLADDER BIOPSY     DR KIRBY    DATE OF SURGERY: OCT 31 2018    ARRIVAL TIME: AS DIRECTED BY OFFICE    DAY OF SURGERY TAKE ONLY THESE MEDICATIONS UNLESS OTHERWISE INSTRUCTED BY YOUR PHYSICIAN: NONE          MANAGING PAIN AFTER SURGERY    We know you are probably wondering what your pain will be like after surgery.  Following surgery it is unrealistic to expect you will not have pain.   Pain is how our bodies let us know that something is wrong or cautions us to be careful.  That said, our goal is to make your pain tolerable.    Methods we may use to treat your pain include (oral or IV medications, PCAs, epidurals, nerve blocks, etc.)   While some procedures require IV pain medications for a short time after surgery, transitioning to pain medications by mouth allows for better management of pain.   Your nurse will encourage you to take oral pain medications whenever possible.  IV medications work almost immediately, but only last a short while.  Taking medications by mouth allows for a more constant level of medication in your blood stream for a longer period of time.      Once your pain is out of control it is harder to get back under control.  It is important you are aware when your next dose of pain medication is due.  If you are admitted, your nurse may write the time of your next dose on the white board in your room to help you remember.      We are interested in your pain and encourage you to inform us about aggravating factors during your visit.   Many times a simple repositioning every few hours can make a big difference.    If your physician says it is okay, do not let your pain prevent you from getting out of bed. Be sure to call your nurse for assistance prior to getting up so you do not fall.      Before surgery, please decide your tolerable pain goal.  These faces help describe the pain ratings we use on a 0-10 scale.   Be prepared to tell us your goal and whether  or not you take pain or anxiety medications at home.            BEFORE YOU COME TO THE HOSPITAL  (Pre-op instructions)  • Do not eat, drink, smoke or chew gum after midnight the night before surgery.  This also includes no mints.  • Morning of surgery take only the medicines you have been instructed with a sip of water unless otherwise instructed  by your physician.  • Do not shave, wear makeup or dark nail polish.  • Remove all jewelry including rings.  • Leave anything you consider valuable at home.  • Leave your suitcase in the car until after your surgery.  • Bring the following with you if applicable:  o Picture ID and insurance, Medicare or Medicaid cards  o Co-pay/deductible required by insurance (cash, check, credit card)  o Copy of advance directive, living will or power-of- documents if not brought to PAT  o CPAP or BIPAP mask and tubing  o Relaxation aids (MP3 player, book, magazine)  • On the day of surgery check in at registration located at the main entrance of the hospital.       Outpatient Surgery Guidelines, Adult  Outpatient procedures are those for which the person having the procedure is allowed to go home the same day as the procedure. Various procedures are done on an outpatient basis. You should follow some general guidelines if you will be having an outpatient procedure.  LET YOUR HEALTH CARE PROVIDER KNOW ABOUT:  · Any allergies you have.  · All medicines you are taking, including vitamins, herbs, eye drops, creams, and over-the-counter medicines.  · Previous problems you or members of your family have had with the use of anesthetics.  · Any blood disorders you have.  · Previous surgeries you have had.  · Medical conditions you have.  RISKS AND COMPLICATIONS  Your health care provider will discuss possible risks and complications with you before surgery. Common risks and complications include:    · Problems due to the use of anesthetics.  · Blood loss and replacement (does not apply  to minor surgical procedures).  · Temporary increase in pain due to surgery.  · Uncorrected pain or problems that the surgery was meant to correct.  · Infection.  · New damage.  BEFORE THE PROCEDURE  · Ask your health care provider about changing or stopping your regular medicines. You may need to stop taking certain medicines in the days or weeks before the procedure.  · Stop smoking at least 2 weeks before surgery. This lowers your risk for complications during and after surgery. Ask your health care provider for help with this if needed.  · Eat your usual meals and a light supper the day before surgery. Continue fluid intake. Do not drink alcohol.  · Do not eat or drink after midnight the night before your surgery.   · Arrange for someone to take you home and to stay with you for 24 hours after the procedure. Medicine given for your procedure may affect your ability to drive or to care for yourself.  · Call your health care provider's office if you develop an illness or problem that may prevent you from safely having your procedure.  AFTER THE PROCEDURE  After surgery, you will be taken to a recovery area, where your progress will be monitored. If there are no complications, you will be allowed to go home when you are awake, stable, and taking fluids well. You may have numbness around the surgical site. Healing will take some time. You will have tenderness at the surgical site and may have some swelling and bruising. You may also have some nausea.  HOME CARE INSTRUCTIONS  · Do not drive for 24 hours, or as directed by your health care provider. Do not drive while taking prescription pain medicines.  · Do not drink alcohol for 24 hours.  · Do not make important decisions or sign legal documents for 24 hours.  · You may resume a normal diet and activities as directed.  · Do not lift anything heavier than 10 pounds (4.5 kg) or play contact sports until your health care provider says it is okay.  · Change your  bandages (dressings) as directed.  · Only take over-the-counter or prescription medicines as directed by your health care provider.  · Follow up with your health care provider as directed.  SEEK MEDICAL CARE IF:  · You have increased bleeding (more than a small spot) from the surgical site.  · You have redness, swelling, or increasing pain in the wound.  · You see pus coming from the wound.  · You have a fever.  · You notice a bad smell coming from the wound or dressing.  · You feel lightheaded or faint.  · You develop a rash.  · You have trouble breathing.  · You develop allergies.  MAKE SURE YOU:  · Understand these instructions.  · Will watch your condition.  · Will get help right away if you are not doing well or get worse.     This information is not intended to replace advice given to you by your health care provider. Make sure you discuss any questions you have with your health care provider.     Document Released: 09/12/2002 Document Revised: 05/03/2016 Document Reviewed: 05/22/2014  [employer] Interactive Patient Education ©2016 [employer]       Fall Prevention in Hospitals, Adult  As a hospital patient, your condition and the treatments you receive can increase your risk for falls. Some additional risk factors for falls in a hospital include:  · Being in an unfamiliar environment.  · Being on bed rest.  · Your surgery.  · Taking certain medicines.  · Your tubing requirements, such as intravenous (IV) therapy or catheters.  It is important that you learn how to decrease fall risks while at the hospital. Below are important tips that can help prevent falls.  SAFETY TIPS FOR PREVENTING FALLS  Talk about your risk of falling.  · Ask your health care provider why you are at risk for falling. Is it your medicine, illness, tubing placement, or something else?  · Make a plan with your health care provider to keep you safe from falls.  · Ask your health care provider or pharmacist about side effects of your  medicines. Some medicines can make you dizzy or affect your coordination.  Ask for help.  · Ask for help before getting out of bed. You may need to press your call button.  · Ask for assistance in getting safely to the toilet.  · Ask for a walker or cane to be put at your bedside. Ask that most of the side rails on your bed be placed up before your health care provider leaves the room.  · Ask family or friends to sit with you.  · Ask for things that are out of your reach, such as your glasses, hearing aids, telephone, bedside table, or call button.  Follow these tips to avoid falling:  · Stay lying or seated, rather than standing, while waiting for help.  · Wear rubber-soled slippers or shoes whenever you walk in the hospital.  · Avoid quick, sudden movements.  ¨ Change positions slowly.  ¨ Sit on the side of your bed before standing.  ¨ Stand up slowly and wait before you start to walk.  · Let your health care provider know if there is a spill on the floor.  · Pay careful attention to the medical equipment, electrical cords, and tubes around you.  · When you need help, use your call button by your bed or in the bathroom. Wait for one of your health care providers to help you.  · If you feel dizzy or unsure of your footing, return to bed and wait for assistance.  · Avoid being distracted by the TV, telephone, or another person in your room.  · Do not lean or support yourself on rolling objects, such as IV poles or bedside tables.     This information is not intended to replace advice given to you by your health care provider. Make sure you discuss any questions you have with your health care provider.     Document Released: 12/15/2001 Document Revised: 01/08/2016 Document Reviewed: 08/25/2013  [employer] Interactive Patient Education ©2016 [employer]       Surgical Site Infections FAQs  What is a Surgical Site Infection (SSI)?  A surgical site infection is an infection that occurs after surgery in the part of the  body where the surgery took place. Most patients who have surgery do not develop an infection. However, infections develop in about 1 to 3 out of every 100 patients who have surgery.  Some of the common symptoms of a surgical site infection are:  · Redness and pain around the area where you had surgery  · Drainage of cloudy fluid from your surgical wound  · Fever  Can SSIs be treated?  Yes. Most surgical site infections can be treated with antibiotics. The antibiotic given to you depends on the bacteria (germs) causing the infection. Sometimes patients with SSIs also need another surgery to treat the infection.  What are some of the things that hospitals are doing to prevent SSIs?  To prevent SSIs, doctors, nurses, and other healthcare providers:  · Clean their hands and arms up to their elbows with an antiseptic agent just before the surgery.  · Clean their hands with soap and water or an alcohol-based hand rub before and after caring for each patient.  · May remove some of your hair immediately before your surgery using electric clippers if the hair is in the same area where the procedure will occur. They should not shave you with a razor.  · Wear special hair covers, masks, gowns, and gloves during surgery to keep the surgery area clean.  · Give you antibiotics before your surgery starts. In most cases, you should get antibiotics within 60 minutes before the surgery starts and the antibiotics should be stopped within 24 hours after surgery.  · Clean the skin at the site of your surgery with a special soap that kills germs.  What can I do to help prevent SSIs?  Before your surgery:  · Tell your doctor about other medical problems you may have. Health problems such as allergies, diabetes, and obesity could affect your surgery and your treatment.  · Quit smoking. Patients who smoke get more infections. Talk to your doctor about how you can quit before your surgery.  · Do not shave near where you will have surgery.  Shaving with a razor can irritate your skin and make it easier to develop an infection.  At the time of your surgery:  · Speak up if someone tries to shave you with a razor before surgery. Ask why you need to be shaved and talk with your surgeon if you have any concerns.  · Ask if you will get antibiotics before surgery.  After your surgery:  · Make sure that your healthcare providers clean their hands before examining you, either with soap and water or an alcohol-based hand rub.  · If you do not see your providers clean their hands, please ask them to do so.  · Family and friends who visit you should not touch the surgical wound or dressings.  · Family and friends should clean their hands with soap and water or an alcohol-based hand rub before and after visiting you. If you do not see them clean their hands, ask them to clean their hands.  What do I need to do when I go home from the hospital?  · Before you go home, your doctor or nurse should explain everything you need to know about taking care of your wound. Make sure you understand how to care for your wound before you leave the hospital.  · Always clean your hands before and after caring for your wound.  · Before you go home, make sure you know who to contact if you have questions or problems after you get home.  · If you have any symptoms of an infection, such as redness and pain at the surgery site, drainage, or fever, call your doctor immediately.  If you have additional questions, please ask your doctor or nurse.  Developed and co-sponsored by The Society for Healthcare Epidemiology of [place] (SHEA); Infectious Diseases Society of [place] (IDSA); American Hospital Association; Association for Professionals in Infection Control and Epidemiology (APIC); Centers for Disease Control and Prevention (CDC); and The Joint Commission.     This information is not intended to replace advice given to you by your health care provider. Make sure you discuss any  questions you have with your health care provider.     Document Released: 12/23/2014 Document Revised: 01/08/2016 Document Reviewed: 03/02/2016  [employer] Interactive Patient Education ©2016 [employer]     PATIENT/FAMILY/RESPONSIBLE PARTY VERBALIZES UNDERSTANDING OF ABOVE EDUCATION.  COPY OF PAIN SCALE GIVEN AND REVIEWED WITH VERBALIZED UNDERSTANDING.

## 2018-10-31 ENCOUNTER — ANESTHESIA EVENT (OUTPATIENT)
Dept: PERIOP | Facility: HOSPITAL | Age: 73
End: 2018-10-31

## 2018-10-31 ENCOUNTER — HOSPITAL ENCOUNTER (OUTPATIENT)
Facility: HOSPITAL | Age: 73
Setting detail: HOSPITAL OUTPATIENT SURGERY
Discharge: HOME | End: 2018-10-31
Attending: UROLOGY | Admitting: UROLOGY

## 2018-10-31 ENCOUNTER — ANESTHESIA (OUTPATIENT)
Dept: PERIOP | Facility: HOSPITAL | Age: 73
End: 2018-10-31

## 2018-10-31 VITALS
RESPIRATION RATE: 18 BRPM | OXYGEN SATURATION: 96 % | SYSTOLIC BLOOD PRESSURE: 121 MMHG | TEMPERATURE: 98.1 F | HEART RATE: 69 BPM | DIASTOLIC BLOOD PRESSURE: 67 MMHG

## 2018-10-31 DIAGNOSIS — D09.0 CIS (CARCINOMA IN SITU OF BLADDER): ICD-10-CM

## 2018-10-31 LAB
GLUCOSE BLDC GLUCOMTR-MCNC: 143 MG/DL (ref 70–130)
GLUCOSE BLDC GLUCOMTR-MCNC: 179 MG/DL (ref 70–130)

## 2018-10-31 PROCEDURE — 82962 GLUCOSE BLOOD TEST: CPT

## 2018-10-31 PROCEDURE — 25010000002 FENTANYL CITRATE (PF) 100 MCG/2ML SOLUTION: Performed by: NURSE ANESTHETIST, CERTIFIED REGISTERED

## 2018-10-31 PROCEDURE — 88305 TISSUE EXAM BY PATHOLOGIST: CPT | Performed by: UROLOGY

## 2018-10-31 PROCEDURE — 25010000002 PROPOFOL 10 MG/ML EMULSION: Performed by: NURSE ANESTHETIST, CERTIFIED REGISTERED

## 2018-10-31 PROCEDURE — 52214 CYSTOSCOPY AND TREATMENT: CPT | Performed by: UROLOGY

## 2018-10-31 RX ORDER — OXYCODONE AND ACETAMINOPHEN 7.5; 325 MG/1; MG/1
2 TABLET ORAL ONCE AS NEEDED
Status: DISCONTINUED | OUTPATIENT
Start: 2018-10-31 | End: 2018-10-31 | Stop reason: HOSPADM

## 2018-10-31 RX ORDER — LIDOCAINE HYDROCHLORIDE 20 MG/ML
INJECTION, SOLUTION INFILTRATION; PERINEURAL AS NEEDED
Status: DISCONTINUED | OUTPATIENT
Start: 2018-10-31 | End: 2018-10-31 | Stop reason: SURG

## 2018-10-31 RX ORDER — ACETAMINOPHEN 500 MG
1000 TABLET ORAL ONCE
Status: DISCONTINUED | OUTPATIENT
Start: 2018-10-31 | End: 2018-10-31 | Stop reason: HOSPADM

## 2018-10-31 RX ORDER — SODIUM CHLORIDE 0.9 % (FLUSH) 0.9 %
3 SYRINGE (ML) INJECTION EVERY 12 HOURS SCHEDULED
Status: DISCONTINUED | OUTPATIENT
Start: 2018-10-31 | End: 2018-10-31 | Stop reason: HOSPADM

## 2018-10-31 RX ORDER — OXYCODONE AND ACETAMINOPHEN 10; 325 MG/1; MG/1
1 TABLET ORAL ONCE AS NEEDED
Status: DISCONTINUED | OUTPATIENT
Start: 2018-10-31 | End: 2018-10-31 | Stop reason: HOSPADM

## 2018-10-31 RX ORDER — NITROFURANTOIN 25; 75 MG/1; MG/1
100 CAPSULE ORAL 2 TIMES DAILY
Qty: 6 CAPSULE | Refills: 0 | Status: SHIPPED | OUTPATIENT
Start: 2018-10-31 | End: 2018-11-03

## 2018-10-31 RX ORDER — METOCLOPRAMIDE HYDROCHLORIDE 5 MG/ML
5 INJECTION INTRAMUSCULAR; INTRAVENOUS
Status: DISCONTINUED | OUTPATIENT
Start: 2018-10-31 | End: 2018-10-31 | Stop reason: HOSPADM

## 2018-10-31 RX ORDER — MIDAZOLAM HYDROCHLORIDE 1 MG/ML
2 INJECTION INTRAMUSCULAR; INTRAVENOUS
Status: DISCONTINUED | OUTPATIENT
Start: 2018-10-31 | End: 2018-10-31

## 2018-10-31 RX ORDER — SODIUM CHLORIDE 0.9 % (FLUSH) 0.9 %
3 SYRINGE (ML) INJECTION AS NEEDED
Status: DISCONTINUED | OUTPATIENT
Start: 2018-10-31 | End: 2018-10-31 | Stop reason: HOSPADM

## 2018-10-31 RX ORDER — MEPERIDINE HYDROCHLORIDE 25 MG/ML
12.5 INJECTION INTRAMUSCULAR; INTRAVENOUS; SUBCUTANEOUS
Status: DISCONTINUED | OUTPATIENT
Start: 2018-10-31 | End: 2018-10-31 | Stop reason: HOSPADM

## 2018-10-31 RX ORDER — ONDANSETRON 2 MG/ML
4 INJECTION INTRAMUSCULAR; INTRAVENOUS ONCE AS NEEDED
Status: DISCONTINUED | OUTPATIENT
Start: 2018-10-31 | End: 2018-10-31 | Stop reason: HOSPADM

## 2018-10-31 RX ORDER — MAGNESIUM HYDROXIDE 1200 MG/15ML
LIQUID ORAL AS NEEDED
Status: DISCONTINUED | OUTPATIENT
Start: 2018-10-31 | End: 2018-10-31 | Stop reason: HOSPADM

## 2018-10-31 RX ORDER — PROPOFOL 10 MG/ML
VIAL (ML) INTRAVENOUS AS NEEDED
Status: DISCONTINUED | OUTPATIENT
Start: 2018-10-31 | End: 2018-10-31 | Stop reason: SURG

## 2018-10-31 RX ORDER — IBUPROFEN 600 MG/1
600 TABLET ORAL ONCE AS NEEDED
Status: DISCONTINUED | OUTPATIENT
Start: 2018-10-31 | End: 2018-10-31 | Stop reason: HOSPADM

## 2018-10-31 RX ORDER — DEXAMETHASONE SODIUM PHOSPHATE 4 MG/ML
4 INJECTION, SOLUTION INTRA-ARTICULAR; INTRALESIONAL; INTRAMUSCULAR; INTRAVENOUS; SOFT TISSUE ONCE AS NEEDED
Status: DISCONTINUED | OUTPATIENT
Start: 2018-10-31 | End: 2018-10-31

## 2018-10-31 RX ORDER — OXYCODONE HYDROCHLORIDE AND ACETAMINOPHEN 5; 325 MG/1; MG/1
1 TABLET ORAL ONCE AS NEEDED
Status: DISCONTINUED | OUTPATIENT
Start: 2018-10-31 | End: 2018-10-31 | Stop reason: HOSPADM

## 2018-10-31 RX ORDER — SODIUM CHLORIDE, SODIUM LACTATE, POTASSIUM CHLORIDE, CALCIUM CHLORIDE 600; 310; 30; 20 MG/100ML; MG/100ML; MG/100ML; MG/100ML
1000 INJECTION, SOLUTION INTRAVENOUS CONTINUOUS
Status: DISCONTINUED | OUTPATIENT
Start: 2018-10-31 | End: 2018-10-31 | Stop reason: HOSPADM

## 2018-10-31 RX ORDER — LABETALOL HYDROCHLORIDE 5 MG/ML
5 INJECTION, SOLUTION INTRAVENOUS
Status: DISCONTINUED | OUTPATIENT
Start: 2018-10-31 | End: 2018-10-31 | Stop reason: HOSPADM

## 2018-10-31 RX ORDER — SORBITOL 30 G/1000ML
IRRIGANT IRRIGATION AS NEEDED
Status: DISCONTINUED | OUTPATIENT
Start: 2018-10-31 | End: 2018-10-31 | Stop reason: HOSPADM

## 2018-10-31 RX ORDER — SODIUM CHLORIDE, SODIUM LACTATE, POTASSIUM CHLORIDE, CALCIUM CHLORIDE 600; 310; 30; 20 MG/100ML; MG/100ML; MG/100ML; MG/100ML
100 INJECTION, SOLUTION INTRAVENOUS CONTINUOUS
Status: DISCONTINUED | OUTPATIENT
Start: 2018-10-31 | End: 2018-10-31 | Stop reason: HOSPADM

## 2018-10-31 RX ORDER — MIDAZOLAM HYDROCHLORIDE 1 MG/ML
1 INJECTION INTRAMUSCULAR; INTRAVENOUS
Status: DISCONTINUED | OUTPATIENT
Start: 2018-10-31 | End: 2018-10-31

## 2018-10-31 RX ORDER — NALOXONE HCL 0.4 MG/ML
0.4 VIAL (ML) INJECTION AS NEEDED
Status: DISCONTINUED | OUTPATIENT
Start: 2018-10-31 | End: 2018-10-31 | Stop reason: HOSPADM

## 2018-10-31 RX ORDER — HYDROCODONE BITARTRATE AND ACETAMINOPHEN 5; 325 MG/1; MG/1
1 TABLET ORAL EVERY 6 HOURS PRN
Qty: 12 TABLET | Refills: 0 | Status: ON HOLD | OUTPATIENT
Start: 2018-10-31 | End: 2021-09-20

## 2018-10-31 RX ORDER — FENTANYL CITRATE 50 UG/ML
25 INJECTION, SOLUTION INTRAMUSCULAR; INTRAVENOUS AS NEEDED
Status: DISCONTINUED | OUTPATIENT
Start: 2018-10-31 | End: 2018-10-31 | Stop reason: HOSPADM

## 2018-10-31 RX ORDER — BUPIVACAINE HCL/0.9 % NACL/PF 0.1 %
2 PLASTIC BAG, INJECTION (ML) EPIDURAL ONCE
Status: COMPLETED | OUTPATIENT
Start: 2018-10-31 | End: 2018-10-31

## 2018-10-31 RX ORDER — SODIUM CHLORIDE 0.9 % (FLUSH) 0.9 %
1-10 SYRINGE (ML) INJECTION AS NEEDED
Status: DISCONTINUED | OUTPATIENT
Start: 2018-10-31 | End: 2018-10-31 | Stop reason: HOSPADM

## 2018-10-31 RX ORDER — FENTANYL CITRATE 50 UG/ML
INJECTION, SOLUTION INTRAMUSCULAR; INTRAVENOUS AS NEEDED
Status: DISCONTINUED | OUTPATIENT
Start: 2018-10-31 | End: 2018-10-31 | Stop reason: SURG

## 2018-10-31 RX ORDER — IPRATROPIUM BROMIDE AND ALBUTEROL SULFATE 2.5; .5 MG/3ML; MG/3ML
3 SOLUTION RESPIRATORY (INHALATION) ONCE AS NEEDED
Status: DISCONTINUED | OUTPATIENT
Start: 2018-10-31 | End: 2018-10-31 | Stop reason: HOSPADM

## 2018-10-31 RX ADMIN — LIDOCAINE HYDROCHLORIDE 0.5 ML: 10 INJECTION, SOLUTION EPIDURAL; INFILTRATION; INTRACAUDAL; PERINEURAL at 05:51

## 2018-10-31 RX ADMIN — SODIUM CHLORIDE, POTASSIUM CHLORIDE, SODIUM LACTATE AND CALCIUM CHLORIDE: 600; 310; 30; 20 INJECTION, SOLUTION INTRAVENOUS at 07:04

## 2018-10-31 RX ADMIN — FENTANYL CITRATE 100 MCG: 50 INJECTION, SOLUTION INTRAMUSCULAR; INTRAVENOUS at 07:05

## 2018-10-31 RX ADMIN — Medication 2 G: at 07:04

## 2018-10-31 RX ADMIN — PROPOFOL 200 MG: 10 INJECTION, EMULSION INTRAVENOUS at 07:05

## 2018-10-31 RX ADMIN — LIDOCAINE HYDROCHLORIDE 100 MG: 20 INJECTION, SOLUTION INFILTRATION; PERINEURAL at 07:05

## 2018-10-31 RX ADMIN — SODIUM CHLORIDE, POTASSIUM CHLORIDE, SODIUM LACTATE AND CALCIUM CHLORIDE 1000 ML: 600; 310; 30; 20 INJECTION, SOLUTION INTRAVENOUS at 05:51

## 2018-10-31 NOTE — ANESTHESIA PREPROCEDURE EVALUATION
Anesthesia Evaluation     Patient summary reviewed and Nursing notes reviewed   history of anesthetic complications: difficult airway  NPO Solid Status: > 8 hours  NPO Liquid Status: > 8 hours           Airway   Mallampati: III  TM distance: >3 FB  Neck ROM: full  Possible difficult intubation  Dental          Pulmonary - negative pulmonary ROS    breath sounds clear to auscultation  (-) asthma, sleep apnea, not a smoker  Cardiovascular   Exercise tolerance: good (4-7 METS)    ECG reviewed  Patient on routine beta blocker and Beta blocker given within 24 hours of surgery  Rhythm: regular  Rate: normal    (+) hypertension,       Neuro/Psych  (-) seizures, TIA, CVA  GI/Hepatic/Renal/Endo    (+)  GERD,  diabetes mellitus type 2,   (-) liver disease, no renal disease (blood in urine)    ROS Comment: Adrenal mass 22 mm, normal metanephrines    Musculoskeletal     Abdominal    Substance History      OB/GYN          Other   (+) arthritis   history of cancer (bladder)        Phys Exam Other: Previously 2 attempts, gr 3 with mac 3, used rodgers, last case in 07/2018 used LMA4                  Anesthesia Plan    ASA 2     general     intravenous induction   Anesthetic plan, all risks, benefits, and alternatives have been provided, discussed and informed consent has been obtained with: patient.

## 2018-10-31 NOTE — ANESTHESIA PROCEDURE NOTES
Airway  Urgency: elective    Airway not difficult    General Information and Staff    Patient location during procedure: OR  CRNA: BENITO SERRANO    Indications and Patient Condition  Indications for airway management: airway protection    Preoxygenated: yes  MILS maintained throughout  Mask difficulty assessment: 1 - vent by mask    Final Airway Details  Final airway type: supraglottic airway      Successful airway: I-gel  Size 3    Number of attempts at approach: 1

## 2018-10-31 NOTE — ANESTHESIA POSTPROCEDURE EVALUATION
Patient: Pam Clayton    Procedure Summary     Date:  10/31/18 Room / Location:   [facility] OR  /  [facility]    Anesthesia Start:  0704 Anesthesia Stop:  0738    Procedure:  CYSTOSCOPY BLADDER BIOPSY WITH FULGURATION (N/A Bladder) Diagnosis:       CIS (carcinoma in situ of bladder)      (CIS (carcinoma in situ of bladder) [D09.0])    Surgeon:  Rj Tijerina MD Provider:  Edmar Panchal CRNA    Anesthesia Type:  general ASA Status:  2          Anesthesia Type: general  Last vitals  BP   136/70 (10/31/18 0531)   Temp   97.7 °F (36.5 °C) (10/31/18 0531)   Pulse   70 (10/31/18 0652)   Resp   16 (10/31/18 0652)     SpO2   98 % (10/31/18 0652)     Post Anesthesia Care and Evaluation    Patient location during evaluation: PACU  Patient participation: complete - patient participated  Level of consciousness: awake and alert  Pain management: adequate  Airway patency: patent  Anesthetic complications: No anesthetic complications    Cardiovascular status: acceptable  Respiratory status: acceptable  Hydration status: acceptable    Comments: Blood pressure 136/70, pulse 70, temperature 97.7 °F (36.5 °C), temperature source Temporal Artery , resp. rate 16, SpO2 98 %.    Pt discharged from PACU based on anya score >8

## 2019-05-08 ENCOUNTER — PROCEDURE VISIT (OUTPATIENT)
Dept: UROLOGY | Facility: CLINIC | Age: 74
End: 2019-05-08

## 2019-05-08 DIAGNOSIS — D09.0 CIS (CARCINOMA IN SITU OF BLADDER): Primary | ICD-10-CM

## 2019-05-08 LAB
BILIRUB BLD-MCNC: NEGATIVE MG/DL
CLARITY, POC: CLEAR
COLOR UR: YELLOW
GLUCOSE UR STRIP-MCNC: NEGATIVE MG/DL
KETONES UR QL: NEGATIVE
LEUKOCYTE EST, POC: NEGATIVE
NITRITE UR-MCNC: NEGATIVE MG/ML
PH UR: 6.5 [PH] (ref 5–8)
PROT UR STRIP-MCNC: NEGATIVE MG/DL
RBC # UR STRIP: NEGATIVE /UL
SP GR UR: 1.02 (ref 1–1.03)
UROBILINOGEN UR QL: NORMAL

## 2019-05-08 PROCEDURE — 88112 CYTOPATH CELL ENHANCE TECH: CPT | Performed by: UROLOGY

## 2019-05-08 PROCEDURE — 81003 URINALYSIS AUTO W/O SCOPE: CPT | Performed by: UROLOGY

## 2019-05-08 PROCEDURE — 52000 CYSTOURETHROSCOPY: CPT | Performed by: UROLOGY

## 2019-05-08 NOTE — PROGRESS NOTES
Pre- operative diagnosis:  Bladder cancer surveillance    Post operative diagnosis:  No recurrent bladder tumor    Procedure:  The patient was prepped and draped in a normal sterile fashion.  The urethra was anesthetized with 2% lidocaine jelly.  A rigid cystoscope was introduced per urethra.  The urethra is normal in appearance without obstruction or mass.  The bladder is without evidence of mucosal lesion.   There is no abnormality of the urothelium.  There is minimal trabeculation of the detrusor muscle.  The ureteral orifices are relatively normal in position and they efflux clear urine.    Patient tolerated the procedure well    Complications: none    Blood loss: minimal    Follow up:    No evidence of recurrence.  She did get her first maintenance BCG in [place].  She is due for a second maintenance BCG and I have scheduled this for her.  She is due for a cystoscopy in 3 months.  I have sent a cytology today.

## 2019-05-10 LAB
CYTO UR: NORMAL
LAB AP CASE REPORT: NORMAL
LAB AP NON-GYN INTERPRETATION: NORMAL
PATH REPORT.FINAL DX SPEC: NORMAL
PATH REPORT.GROSS SPEC: NORMAL

## 2019-05-15 ENCOUNTER — INFUSION (OUTPATIENT)
Dept: ONCOLOGY | Facility: HOSPITAL | Age: 74
End: 2019-05-15

## 2019-05-15 ENCOUNTER — LAB (OUTPATIENT)
Dept: LAB | Facility: HOSPITAL | Age: 74
End: 2019-05-15

## 2019-05-15 VITALS
HEART RATE: 68 BPM | SYSTOLIC BLOOD PRESSURE: 141 MMHG | TEMPERATURE: 98 F | DIASTOLIC BLOOD PRESSURE: 67 MMHG | OXYGEN SATURATION: 99 %

## 2019-05-15 DIAGNOSIS — D09.0 CIS (CARCINOMA IN SITU OF BLADDER): Primary | ICD-10-CM

## 2019-05-15 DIAGNOSIS — D09.0 CIS (CARCINOMA IN SITU OF BLADDER): ICD-10-CM

## 2019-05-15 LAB
BACTERIA UR QL AUTO: ABNORMAL /HPF
BILIRUB UR QL STRIP: NEGATIVE
CLARITY UR: CLEAR
COLOR UR: YELLOW
GLUCOSE UR STRIP-MCNC: NEGATIVE MG/DL
HGB UR QL STRIP.AUTO: NEGATIVE
HYALINE CASTS UR QL AUTO: ABNORMAL /LPF
KETONES UR QL STRIP: NEGATIVE
LEUKOCYTE ESTERASE UR QL STRIP.AUTO: ABNORMAL
NITRITE UR QL STRIP: NEGATIVE
PH UR STRIP.AUTO: 6 [PH] (ref 5–8)
PROT UR QL STRIP: NEGATIVE
RBC # UR: ABNORMAL /HPF
REF LAB TEST METHOD: ABNORMAL
SP GR UR STRIP: 1.02 (ref 1–1.03)
SQUAMOUS #/AREA URNS HPF: ABNORMAL /HPF
UROBILINOGEN UR QL STRIP: ABNORMAL
WBC UR QL AUTO: ABNORMAL /HPF

## 2019-05-15 PROCEDURE — 81001 URINALYSIS AUTO W/SCOPE: CPT | Performed by: UROLOGY

## 2019-05-15 PROCEDURE — 25010000002 BCG PER INSTILLATION: Performed by: UROLOGY

## 2019-05-15 RX ADMIN — SODIUM CHLORIDE 50 MG: 9 INJECTION, SOLUTION INTRAVENOUS at 13:45

## 2019-05-15 NOTE — PATIENT INSTRUCTIONS
Bacillus Calmette-Jeferson Live, BCG intravesical solution  What is this medicine?  BACILLUS CALMETTE-JEFERSON LIVE, BCG (tiffany HAGAN us TERRI met coats RAYN) is a bacteria solution. This medicine stimulates the immune system to german off cancer cells. It is used to treat bladder cancer.  This medicine may be used for other purposes; ask your health care provider or pharmacist if you have questions.  COMMON BRAND NAME(S): Theracys, LEVAR BCG  What should I tell my health care provider before I take this medicine?  They need to know if you have any of these conditions:  -aneurysm  -blood in the urine  -bladder biopsy within 2 weeks  -fever or infection  -immune system problems  -leukemia  -lymphoma  -myasthenia gravis  -need organ transplant  -prosthetic device like arterial graft, artificial joint, prosthetic heart valve  -recent or ongoing radiation therapy  -tuberculosis  -an unusual or allergic reaction to Bacillus Calmette-Jeferson Live, BCG, latex, other medicines, foods, dyes, or preservatives  -pregnant or trying to get pregnant  -breast-feeding  How should I use this medicine?  This drug is given as a catheter infusion into the bladder. It is administered in a hospital or clinic by a specially trained health care professional. You will be given directions to follow before the treatment. Follow your doctor's directions carefully. Try to hold this medicine in your bladder for 2 hours after treatment.  Talk to your pediatrician regarding the use of this medicine in children. Special care may be needed.  Overdosage: If you think you have taken too much of this medicine contact a poison control center or emergency room at once.  NOTE: This medicine is only for you. Do not share this medicine with others.  What if I miss a dose?  It is important not to miss your dose. Call your doctor or health care professional if you are unable to keep an appointment.  What may interact with this medicine?  -antibiotics  -medicines to suppress  your immune system like chemotherapy agents or corticosteroids  -medicine to treat tuberculosis  This list may not describe all possible interactions. Give your health care provider a list of all the medicines, herbs, non-prescription drugs, or dietary supplements you use. Also tell them if you smoke, drink alcohol, or use illegal drugs. Some items may interact with your medicine.  What should I watch for while using this medicine?  Visit your doctor for checks on your progress. This drug may make you feel generally unwell. Contact your doctor if your symptoms last more than 2 days or if they get worse. Call your doctor right away if you have a severe or unusual symptom.  Infection can be spread to others through contact with this medicine. To prevent the spread of infection follow your doctor's directions carefully after treatment. For the first 6 hours after each treatment, sit down on the toilet to urinate. After urinating, add 2 cups of bleach to the toilet bowl and let set for 15 minutes before flushing. Wash your hands before and after using the restroom.  Drink water or other fluids as directed after treatment with this medicine.  Do not become pregnant while taking this medicine. Women should inform their doctor if they wish to become pregnant or think they might be pregnant. There is a potential for serious side effects to an unborn child. Talk to your health care professional or pharmacist for more information. Do not breast-feed an infant while taking this medicine.  What side effects may I notice from receiving this medicine?  Side effects that you should report to your doctor or health care professional as soon as possible:  -allergic reactions like skin rash, itching or hives, swelling of the face, lips, or tongue  -signs of infection - fever or chills, cough, sore throat, pain or difficulty passing urine  -signs of decreased red blood cells - unusually weak or tired, fainting spells,  lightheadedness  -blood in urine  -breathing problems  -cough  -eye pain, redness  -flu-like symptoms  -joint pain  -bladder-area pain for more than 2 days after treatment  -trouble passing urine or change in the amount of urine  -vomiting  -yellowing of the eyes or skin  Side effects that usually do not require medical attention (report to your doctor or health care professional if they continue or are bothersome):  -bladder spasm  -burning when passing urine within 2 days of treatment  -feel need to pass urine often or wake up at night to pass urine  -loss of appetite  This list may not describe all possible side effects. Call your doctor for medical advice about side effects. You may report side effects to FDA at 5-256-FDA-8992.  Where should I keep my medicine?  This drug is given in a hospital or clinic and will not be stored at home.  NOTE: This sheet is a summary. It may not cover all possible information. If you have questions about this medicine, talk to your doctor, pharmacist, or health care provider.  © 2019 [employer] (2017-01-19 10:33:35)

## 2019-05-20 ENCOUNTER — TELEPHONE (OUTPATIENT)
Dept: UROLOGY | Facility: CLINIC | Age: 74
End: 2019-05-20

## 2019-05-20 NOTE — TELEPHONE ENCOUNTER
----- Message from Rj Tijerina MD sent at 5/19/2019  1:35 PM CDT -----  Please let her know her cytology is normal

## 2019-05-22 ENCOUNTER — APPOINTMENT (OUTPATIENT)
Dept: LAB | Facility: HOSPITAL | Age: 74
End: 2019-05-22

## 2019-05-22 ENCOUNTER — INFUSION (OUTPATIENT)
Dept: ONCOLOGY | Facility: HOSPITAL | Age: 74
End: 2019-05-22

## 2019-05-22 VITALS
TEMPERATURE: 98.5 F | WEIGHT: 174 LBS | OXYGEN SATURATION: 99 % | HEIGHT: 67 IN | HEART RATE: 76 BPM | DIASTOLIC BLOOD PRESSURE: 69 MMHG | SYSTOLIC BLOOD PRESSURE: 143 MMHG | RESPIRATION RATE: 18 BRPM | BODY MASS INDEX: 27.31 KG/M2

## 2019-05-22 DIAGNOSIS — D09.0 CIS (CARCINOMA IN SITU OF BLADDER): Primary | ICD-10-CM

## 2019-05-22 LAB
BACTERIA UR QL AUTO: ABNORMAL /HPF
BILIRUB UR QL STRIP: NEGATIVE
CLARITY UR: CLEAR
COLOR UR: YELLOW
GLUCOSE UR STRIP-MCNC: ABNORMAL MG/DL
HGB UR QL STRIP.AUTO: NEGATIVE
HYALINE CASTS UR QL AUTO: ABNORMAL /LPF
KETONES UR QL STRIP: NEGATIVE
LEUKOCYTE ESTERASE UR QL STRIP.AUTO: ABNORMAL
NITRITE UR QL STRIP: NEGATIVE
PH UR STRIP.AUTO: 6 [PH] (ref 5–8)
PROT UR QL STRIP: NEGATIVE
RBC # UR: ABNORMAL /HPF
REF LAB TEST METHOD: ABNORMAL
SP GR UR STRIP: 1.02 (ref 1–1.03)
SQUAMOUS #/AREA URNS HPF: ABNORMAL /HPF
UROBILINOGEN UR QL STRIP: ABNORMAL
WBC UR QL AUTO: ABNORMAL /HPF

## 2019-05-22 PROCEDURE — 25010000002 BCG PER INSTILLATION: Performed by: UROLOGY

## 2019-05-22 PROCEDURE — 81001 URINALYSIS AUTO W/SCOPE: CPT | Performed by: UROLOGY

## 2019-05-22 RX ADMIN — SODIUM CHLORIDE 50 MG: 9 INJECTION, SOLUTION INTRAVENOUS at 14:29

## 2019-05-24 ENCOUNTER — RESULTS ENCOUNTER (OUTPATIENT)
Dept: UROLOGY | Facility: CLINIC | Age: 74
End: 2019-05-24

## 2019-05-24 DIAGNOSIS — D09.0 CIS (CARCINOMA IN SITU OF BLADDER): ICD-10-CM

## 2019-05-29 ENCOUNTER — APPOINTMENT (OUTPATIENT)
Dept: LAB | Facility: HOSPITAL | Age: 74
End: 2019-05-29

## 2019-05-29 ENCOUNTER — INFUSION (OUTPATIENT)
Dept: ONCOLOGY | Facility: HOSPITAL | Age: 74
End: 2019-05-29

## 2019-05-29 VITALS
SYSTOLIC BLOOD PRESSURE: 119 MMHG | DIASTOLIC BLOOD PRESSURE: 72 MMHG | TEMPERATURE: 98.1 F | WEIGHT: 174 LBS | BODY MASS INDEX: 27.97 KG/M2 | HEART RATE: 65 BPM | OXYGEN SATURATION: 100 % | HEIGHT: 66 IN

## 2019-05-29 DIAGNOSIS — D09.0 CIS (CARCINOMA IN SITU OF BLADDER): Primary | ICD-10-CM

## 2019-05-29 LAB
BACTERIA UR QL AUTO: ABNORMAL /HPF
BILIRUB UR QL STRIP: NEGATIVE
CLARITY UR: CLEAR
COLOR UR: YELLOW
GLUCOSE UR STRIP-MCNC: NEGATIVE MG/DL
HGB UR QL STRIP.AUTO: NEGATIVE
HYALINE CASTS UR QL AUTO: ABNORMAL /LPF
KETONES UR QL STRIP: NEGATIVE
LEUKOCYTE ESTERASE UR QL STRIP.AUTO: ABNORMAL
NITRITE UR QL STRIP: NEGATIVE
PH UR STRIP.AUTO: 6.5 [PH] (ref 5–8)
PROT UR QL STRIP: NEGATIVE
RBC # UR: ABNORMAL /HPF
REF LAB TEST METHOD: ABNORMAL
SP GR UR STRIP: 1.02 (ref 1–1.03)
SQUAMOUS #/AREA URNS HPF: ABNORMAL /HPF
UROBILINOGEN UR QL STRIP: ABNORMAL
WBC UR QL AUTO: ABNORMAL /HPF

## 2019-05-29 PROCEDURE — 81001 URINALYSIS AUTO W/SCOPE: CPT | Performed by: UROLOGY

## 2019-05-29 PROCEDURE — 25010000002 BCG PER INSTILLATION: Performed by: UROLOGY

## 2019-05-29 RX ADMIN — SODIUM CHLORIDE 50 MG: 9 INJECTION, SOLUTION INTRAVENOUS at 13:41

## 2019-05-29 NOTE — PATIENT INSTRUCTIONS
Bacillus Calmette-Jeferson Live, BCG intravesical solution  What is this medicine?  BACILLUS CALMETTE-JEFERSON LIVE, BCG (tiffany HAGAN us TERRI met coats RAYN) is a bacteria solution. This medicine stimulates the immune system to german off cancer cells. It is used to treat bladder cancer.  This medicine may be used for other purposes; ask your health care provider or pharmacist if you have questions.  COMMON BRAND NAME(S): Theracys, LEVAR BCG  What should I tell my health care provider before I take this medicine?  They need to know if you have any of these conditions:  -aneurysm  -blood in the urine  -bladder biopsy within 2 weeks  -fever or infection  -immune system problems  -leukemia  -lymphoma  -myasthenia gravis  -need organ transplant  -prosthetic device like arterial graft, artificial joint, prosthetic heart valve  -recent or ongoing radiation therapy  -tuberculosis  -an unusual or allergic reaction to Bacillus Calmette-Jeferson Live, BCG, latex, other medicines, foods, dyes, or preservatives  -pregnant or trying to get pregnant  -breast-feeding  How should I use this medicine?  This drug is given as a catheter infusion into the bladder. It is administered in a hospital or clinic by a specially trained health care professional. You will be given directions to follow before the treatment. Follow your doctor's directions carefully. Try to hold this medicine in your bladder for 2 hours after treatment.  Talk to your pediatrician regarding the use of this medicine in children. Special care may be needed.  Overdosage: If you think you have taken too much of this medicine contact a poison control center or emergency room at once.  NOTE: This medicine is only for you. Do not share this medicine with others.  What if I miss a dose?  It is important not to miss your dose. Call your doctor or health care professional if you are unable to keep an appointment.  What may interact with this medicine?  -antibiotics  -medicines to suppress  your immune system like chemotherapy agents or corticosteroids  -medicine to treat tuberculosis  This list may not describe all possible interactions. Give your health care provider a list of all the medicines, herbs, non-prescription drugs, or dietary supplements you use. Also tell them if you smoke, drink alcohol, or use illegal drugs. Some items may interact with your medicine.  What should I watch for while using this medicine?  Visit your doctor for checks on your progress. This drug may make you feel generally unwell. Contact your doctor if your symptoms last more than 2 days or if they get worse. Call your doctor right away if you have a severe or unusual symptom.  Infection can be spread to others through contact with this medicine. To prevent the spread of infection follow your doctor's directions carefully after treatment. For the first 6 hours after each treatment, sit down on the toilet to urinate. After urinating, add 2 cups of bleach to the toilet bowl and let set for 15 minutes before flushing. Wash your hands before and after using the restroom.  Drink water or other fluids as directed after treatment with this medicine.  Do not become pregnant while taking this medicine. Women should inform their doctor if they wish to become pregnant or think they might be pregnant. There is a potential for serious side effects to an unborn child. Talk to your health care professional or pharmacist for more information. Do not breast-feed an infant while taking this medicine.  What side effects may I notice from receiving this medicine?  Side effects that you should report to your doctor or health care professional as soon as possible:  -allergic reactions like skin rash, itching or hives, swelling of the face, lips, or tongue  -signs of infection - fever or chills, cough, sore throat, pain or difficulty passing urine  -signs of decreased red blood cells - unusually weak or tired, fainting spells,  lightheadedness  -blood in urine  -breathing problems  -cough  -eye pain, redness  -flu-like symptoms  -joint pain  -bladder-area pain for more than 2 days after treatment  -trouble passing urine or change in the amount of urine  -vomiting  -yellowing of the eyes or skin  Side effects that usually do not require medical attention (report to your doctor or health care professional if they continue or are bothersome):  -bladder spasm  -burning when passing urine within 2 days of treatment  -feel need to pass urine often or wake up at night to pass urine  -loss of appetite  This list may not describe all possible side effects. Call your doctor for medical advice about side effects. You may report side effects to FDA at 2-564-FDA-0781.  Where should I keep my medicine?  This drug is given in a hospital or clinic and will not be stored at home.  NOTE: This sheet is a summary. It may not cover all possible information. If you have questions about this medicine, talk to your doctor, pharmacist, or health care provider.  © 2019 [employer]vier/Gold Standard (2017-01-19 10:33:35)

## 2019-05-31 ENCOUNTER — RESULTS ENCOUNTER (OUTPATIENT)
Dept: UROLOGY | Facility: CLINIC | Age: 74
End: 2019-05-31

## 2019-05-31 DIAGNOSIS — D09.0 CIS (CARCINOMA IN SITU OF BLADDER): ICD-10-CM

## 2019-06-04 ENCOUNTER — OFFICE VISIT (OUTPATIENT)
Dept: UROLOGY | Facility: CLINIC | Age: 74
End: 2019-06-04

## 2019-06-04 VITALS — TEMPERATURE: 98.4 F | HEIGHT: 66 IN | WEIGHT: 175 LBS | BODY MASS INDEX: 28.12 KG/M2

## 2019-06-04 DIAGNOSIS — R30.0 DYSURIA: Primary | ICD-10-CM

## 2019-06-04 PROCEDURE — 87077 CULTURE AEROBIC IDENTIFY: CPT | Performed by: NURSE PRACTITIONER

## 2019-06-04 PROCEDURE — 87186 SC STD MICRODIL/AGAR DIL: CPT | Performed by: NURSE PRACTITIONER

## 2019-06-04 PROCEDURE — 87086 URINE CULTURE/COLONY COUNT: CPT | Performed by: NURSE PRACTITIONER

## 2019-06-04 PROCEDURE — 99214 OFFICE O/P EST MOD 30 MIN: CPT | Performed by: NURSE PRACTITIONER

## 2019-06-04 PROCEDURE — 87088 URINE BACTERIA CULTURE: CPT | Performed by: NURSE PRACTITIONER

## 2019-06-04 RX ORDER — CEFDINIR 300 MG/1
300 CAPSULE ORAL 2 TIMES DAILY
Qty: 10 CAPSULE | Refills: 0 | Status: SHIPPED | OUTPATIENT
Start: 2019-06-04 | End: 2019-06-09

## 2019-06-04 NOTE — PATIENT INSTRUCTIONS

## 2019-06-04 NOTE — PROGRESS NOTES
Ms. Clayton is 73 y.o. female    Chief Complaint   Patient presents with   • Difficulty Urinating       History of Present Illness  Patient presents with complaints of dysuria, frequency, nocturia 3-4 times.  She states the symptoms have been present since her last BCG treatment on May 29.  Patient denies any fevers, chills, nausea, vomiting, incomplete bladder emptying, urgency, abdominal pain, hematuria.  She has tried taking Pyridium at home without any relief of symptoms.  She does not identify any other alleviating or aggravating factors.    The following portions of the patient's history were reviewed and updated as appropriate: allergies, current medications, past family history, past medical history, past social history, past surgical history and problem list.    Review of Systems   Constitutional: Negative for chills and fever.   Gastrointestinal: Negative for abdominal distention, abdominal pain, anal bleeding, blood in stool, nausea and vomiting.   Genitourinary: Positive for dysuria (burning ) and frequency. Negative for decreased urine volume, difficulty urinating, dyspareunia, enuresis, flank pain, genital sores, hematuria, menstrual problem, pelvic pain, urgency, vaginal bleeding, vaginal discharge and vaginal pain.       Current Outpatient Medications:   •  Cetirizine HCl 10 MG capsule, Take 1 tablet by mouth Daily., Disp: , Rfl:   •  Coenzyme Q10 10 MG capsule, Take 400 mg by mouth Daily., Disp: , Rfl:   •  esomeprazole (nexIUM) 40 MG capsule, Take 40 mg by mouth Every Morning Before Breakfast., Disp: , Rfl:   •  glipiZIDE (GLUCOTROL XL) 5 MG ER tablet, Take 5 mg by mouth Daily., Disp: , Rfl: 2  •  glucosamine sulfate 500 MG capsule capsule, Take 500 mg by mouth 2 (Two) Times a Day., Disp: , Rfl:   •  HYDROcodone-acetaminophen (NORCO) 5-325 MG per tablet, Take 1 tablet by mouth Every 6 (Six) Hours As Needed for Moderate Pain ., Disp: 12 tablet, Rfl: 0  •  Lactobacillus-Inulin ([facility] [employer] &  WELLNESS) capsule, Take 1 tablet by mouth Daily., Disp: , Rfl:   •  losartan (COZAAR) 50 MG tablet, Take 50 mg by mouth Daily., Disp: , Rfl: 1  •  metoprolol succinate XL (TOPROL XL) 100 MG 24 hr tablet, Take 50 mg by mouth Every Night., Disp: , Rfl:   •  MULTIPLE VITAMIN PO, Take 1 tablet by mouth Daily., Disp: , Rfl:   •  aspirin 81 MG EC tablet, Take 81 mg by mouth., Disp: , Rfl:   •  cefdinir (OMNICEF) 300 MG capsule, Take 1 capsule by mouth 2 (Two) Times a Day for 5 days., Disp: 10 capsule, Rfl: 0  •  metFORMIN ER (GLUCOPHAGE-XR) 500 MG 24 hr tablet, Take 500 mg by mouth Daily., Disp: , Rfl: 3    Past Medical History:   Diagnosis Date   • Arthritis    • Bladder cancer (CMS/HCC)    • Diabetes mellitus (CMS/HCC)    • GERD (gastroesophageal reflux disease)    • Hard of hearing    • Hx of excision of tumor of brain meninges    • Hypertension    • UTI (urinary tract infection)        Past Surgical History:   Procedure Laterality Date   • APPENDECTOMY     • BRAIN TUMOR EXCISION     • CHOLECYSTECTOMY     • CYSTOSCOPY BLADDER BIOPSY N/A 7/25/2018    Procedure: CYSTOSCOPY BILATERAL RETROGRADE PYELOGRAM BLADDER BIOPSIES WITH FULGURATION;  Surgeon: Rj Tijerina MD;  Location: [facility] OR;  Service: Urology   • CYSTOSCOPY BLADDER BIOPSY N/A 10/31/2018    Procedure: CYSTOSCOPY BLADDER BIOPSY WITH FULGURATION;  Surgeon: Rj Tijerina MD;  Location: [facility] OR;  Service: Urology   • HYSTERECTOMY     • TOTAL KNEE ARTHROPLASTY Bilateral        Social History     Socioeconomic History   • Marital status:      Spouse name: Not on file   • Number of children: Not on file   • Years of education: Not on file   • Highest education level: Not on file   Tobacco Use   • Smoking status: Never Smoker   • Smokeless tobacco: Never Used   Substance and Sexual Activity   • Alcohol use: No   • Drug use: No   • Sexual activity: Defer     Partners: Male       Family History   Problem Relation Age of Onset   • No Known Problems  "Father    • No Known Problems Mother    • Cancer Neg Hx      Objective    Temp 98.4 °F (36.9 °C)   Ht 167.6 cm (65.98\")   Wt 79.4 kg (175 lb)   Breastfeeding? No   BMI 28.26 kg/m²     Physical Exam   Constitutional: She is oriented to person, place, and time. She appears well-developed.   HENT:   Head: Normocephalic.   Pulmonary/Chest: Effort normal. No respiratory distress.   Abdominal: Soft. She exhibits no distension.   Musculoskeletal: She exhibits no edema.   Neurological: She is alert and oriented to person, place, and time.   Skin: Skin is warm and dry.   Psychiatric: She has a normal mood and affect. Her behavior is normal.   Vitals reviewed.      Patient's Body mass index is 28.26 kg/m². BMI is above normal parameters. Recommendations include: educational material.      Infusion on 05/29/2019   Component Date Value Ref Range Status   • Color, UA 05/29/2019 Yellow  Yellow, Straw Final   • Appearance, UA 05/29/2019 Clear  Clear Final   • pH, UA 05/29/2019 6.5  5.0 - 8.0 Final   • Specific Gravity, UA 05/29/2019 1.017  1.005 - 1.030 Final   • Glucose, UA 05/29/2019 Negative  Negative Final   • Ketones, UA 05/29/2019 Negative  Negative Final   • Bilirubin, UA 05/29/2019 Negative  Negative Final   • Blood, UA 05/29/2019 Negative  Negative Final   • Protein, UA 05/29/2019 Negative  Negative Final   • Leuk Esterase, UA 05/29/2019 Moderate (2+)* Negative Final   • Nitrite, UA 05/29/2019 Negative  Negative Final   • Urobilinogen, UA 05/29/2019 0.2 E.U./dL  0.2 - 1.0 E.U./dL Final   • RBC, UA 05/29/2019 6-12* None Seen /HPF Final   • WBC, UA 05/29/2019 Too Numerous to Count* None Seen /HPF Final   • Bacteria, UA 05/29/2019 None Seen  None Seen /HPF Final   • Squamous Epithelial Cells, UA 05/29/2019 0-2  None Seen, 0-2 /HPF Final   • Hyaline Casts, UA 05/29/2019 3-6  None Seen /LPF Final   • Methodology 05/29/2019 Automated Microscopy   Final       Results for orders placed or performed in visit on 05/29/19 "   Urinalysis With Microscopic If Indicated (No Culture) - Urine, Clean Catch   Result Value Ref Range    Color, UA Yellow Yellow, Straw    Appearance, UA Clear Clear    pH, UA 6.5 5.0 - 8.0    Specific Gravity, UA 1.017 1.005 - 1.030    Glucose, UA Negative Negative    Ketones, UA Negative Negative    Bilirubin, UA Negative Negative    Blood, UA Negative Negative    Protein, UA Negative Negative    Leuk Esterase, UA Moderate (2+) (A) Negative    Nitrite, UA Negative Negative    Urobilinogen, UA 0.2 E.U./dL 0.2 - 1.0 E.U./dL   Urinalysis, Microscopic Only - Urine, Clean Catch   Result Value Ref Range    RBC, UA 6-12 (A) None Seen /HPF    WBC, UA Too Numerous to Count (A) None Seen /HPF    Bacteria, UA None Seen None Seen /HPF    Squamous Epithelial Cells, UA 0-2 None Seen, 0-2 /HPF    Hyaline Casts, UA 3-6 None Seen /LPF    Methodology Automated Microscopy         Assessment/Plan   Assessment and Plan    Pam was seen today for difficulty urinating.    Diagnoses and all orders for this visit:    Dysuria  -     Urine Culture - Urine, Urine, Clean Catch; Future  -     Urine Culture - Urine, Urine, Clean Catch  -     cefdinir (OMNICEF) 300 MG capsule; Take 1 capsule by mouth 2 (Two) Times a Day for 5 days.      Patient presents with complaints of irritative urinary symptoms following BCG treatment.  Patient states she does experience mild irritative symptoms following her BCG treatments, but typically these resolve in a few days.  She is concerned as these symptoms are worsening and persistent.  She is requesting I call in an antibiotic as she is going out of town later this week.  I will go ahead and send in a 5-day prescription of Omnicef.  I have encouraged the patient to delay initiation of antibiotic therapy until culture results have returned, but have ultimately left this decision up to her.    She will follow-up as previously scheduled with Dr. Lin in August for cystoscopy.

## 2019-06-06 ENCOUNTER — TELEPHONE (OUTPATIENT)
Dept: UROLOGY | Facility: CLINIC | Age: 74
End: 2019-06-06

## 2019-06-06 DIAGNOSIS — R30.0 DYSURIA: Primary | ICD-10-CM

## 2019-06-06 NOTE — TELEPHONE ENCOUNTER
Called and informed pt that after talking to the  lab they state it will be done tomorrow. Pt verified understanding.

## 2019-06-06 NOTE — TELEPHONE ENCOUNTER
Patient was calling for her urine culture results. She asked that you call her back at [phone].

## 2019-06-07 ENCOUNTER — TELEPHONE (OUTPATIENT)
Dept: UROLOGY | Facility: CLINIC | Age: 74
End: 2019-06-07

## 2019-06-07 ENCOUNTER — RESULTS ENCOUNTER (OUTPATIENT)
Dept: UROLOGY | Facility: CLINIC | Age: 74
End: 2019-06-07

## 2019-06-07 DIAGNOSIS — D09.0 CIS (CARCINOMA IN SITU OF BLADDER): ICD-10-CM

## 2019-06-07 DIAGNOSIS — R30.0 DYSURIA: Primary | ICD-10-CM

## 2019-06-07 LAB
BACTERIA SPEC AEROBE CULT: ABNORMAL

## 2019-06-07 RX ORDER — CEFDINIR 300 MG/1
300 CAPSULE ORAL 2 TIMES DAILY
Qty: 4 CAPSULE | Refills: 0 | Status: SHIPPED | OUTPATIENT
Start: 2019-06-07 | End: 2019-06-20

## 2019-06-07 NOTE — TELEPHONE ENCOUNTER
Called and informed pt of positive urine culture. I explained that she would need to take original script Jessica sent in when pt was in the office and that we have sent in 2 extra days on top on the script. She confirmed understanding and would go  the additional script.

## 2019-06-07 NOTE — TELEPHONE ENCOUNTER
Pt came into the office and stated that she would like to know the results so she can be treated. She urinates every 2 hours and it burns when she goes. She stated that she was told the results were delayed yesterday. Please call the pt as soon as the results come in.

## 2019-06-12 ENCOUNTER — PROCEDURE VISIT (OUTPATIENT)
Dept: UROLOGY | Facility: CLINIC | Age: 74
End: 2019-06-12

## 2019-06-12 ENCOUNTER — TELEPHONE (OUTPATIENT)
Dept: UROLOGY | Facility: CLINIC | Age: 74
End: 2019-06-12

## 2019-06-12 DIAGNOSIS — R30.0 DYSURIA: Primary | ICD-10-CM

## 2019-06-12 LAB
BILIRUB BLD-MCNC: NEGATIVE MG/DL
CLARITY, POC: CLEAR
COLOR UR: YELLOW
GLUCOSE UR STRIP-MCNC: NEGATIVE MG/DL
KETONES UR QL: NEGATIVE
LEUKOCYTE EST, POC: ABNORMAL
NITRITE UR-MCNC: NEGATIVE MG/ML
PH UR: 6 [PH] (ref 5–8)
PROT UR STRIP-MCNC: NEGATIVE MG/DL
RBC # UR STRIP: NEGATIVE /UL
SP GR UR: 1.01 (ref 1–1.03)
UROBILINOGEN UR QL: NORMAL

## 2019-06-12 PROCEDURE — 81002 URINALYSIS NONAUTO W/O SCOPE: CPT | Performed by: NURSE PRACTITIONER

## 2019-06-12 PROCEDURE — 87086 URINE CULTURE/COLONY COUNT: CPT | Performed by: NURSE PRACTITIONER

## 2019-06-12 NOTE — TELEPHONE ENCOUNTER
Pt called stating she is still having dysuria after being atb for 6 days. After consulting with Jessica she advised for her to come in and leave a sample for us to send off if need be for culture. Called and told pt she will come in today she states.

## 2019-06-14 ENCOUNTER — RESULTS ENCOUNTER (OUTPATIENT)
Dept: UROLOGY | Facility: CLINIC | Age: 74
End: 2019-06-14

## 2019-06-14 DIAGNOSIS — D09.0 CIS (CARCINOMA IN SITU OF BLADDER): ICD-10-CM

## 2019-06-14 LAB — BACTERIA SPEC AEROBE CULT: ABNORMAL

## 2019-06-17 ENCOUNTER — TELEPHONE (OUTPATIENT)
Dept: UROLOGY | Facility: CLINIC | Age: 74
End: 2019-06-17

## 2019-06-18 ENCOUNTER — TELEPHONE (OUTPATIENT)
Dept: UROLOGY | Facility: CLINIC | Age: 74
End: 2019-06-18

## 2019-06-18 ENCOUNTER — OFFICE VISIT (OUTPATIENT)
Dept: UROLOGY | Facility: CLINIC | Age: 74
End: 2019-06-18

## 2019-06-18 DIAGNOSIS — R30.0 DYSURIA: Primary | ICD-10-CM

## 2019-06-18 PROCEDURE — 81002 URINALYSIS NONAUTO W/O SCOPE: CPT | Performed by: NURSE PRACTITIONER

## 2019-06-18 PROCEDURE — 87086 URINE CULTURE/COLONY COUNT: CPT | Performed by: NURSE PRACTITIONER

## 2019-06-20 ENCOUNTER — TELEPHONE (OUTPATIENT)
Dept: UROLOGY | Facility: CLINIC | Age: 74
End: 2019-06-20

## 2019-06-20 DIAGNOSIS — N39.0 URINARY TRACT INFECTION WITHOUT HEMATURIA, SITE UNSPECIFIED: Primary | ICD-10-CM

## 2019-06-20 LAB
BACTERIA SPEC AEROBE CULT: ABNORMAL

## 2019-06-20 RX ORDER — AMOXICILLIN 500 MG/1
500 CAPSULE ORAL 2 TIMES DAILY
Qty: 20 CAPSULE | Refills: 0 | Status: SHIPPED | OUTPATIENT
Start: 2019-06-20 | End: 2021-09-20

## 2019-06-20 NOTE — TELEPHONE ENCOUNTER
----- Message from ANH Loaiza sent at 6/20/2019  9:12 AM CDT -----  I sent in amoxicillin twice a day for 10 days    ----- Message -----  From: Alison Carpenter LPN  Sent: 6/18/2019   2:00 PM  To: ANH Loaiza

## 2019-06-20 NOTE — TELEPHONE ENCOUNTER
Called and confirmed with pt  that pt urine culture was positive for infection and Jessica faxed a 10 day course of atb to her pharm to start taking today. She will call back with any further questions or concerns.

## 2019-06-21 ENCOUNTER — RESULTS ENCOUNTER (OUTPATIENT)
Dept: UROLOGY | Facility: CLINIC | Age: 74
End: 2019-06-21

## 2019-06-21 DIAGNOSIS — D09.0 CIS (CARCINOMA IN SITU OF BLADDER): ICD-10-CM

## 2019-06-28 ENCOUNTER — RESULTS ENCOUNTER (OUTPATIENT)
Dept: UROLOGY | Facility: CLINIC | Age: 74
End: 2019-06-28

## 2019-06-28 DIAGNOSIS — D09.0 CIS (CARCINOMA IN SITU OF BLADDER): ICD-10-CM

## 2019-07-05 ENCOUNTER — RESULTS ENCOUNTER (OUTPATIENT)
Dept: UROLOGY | Facility: CLINIC | Age: 74
End: 2019-07-05

## 2019-07-05 DIAGNOSIS — D09.0 CIS (CARCINOMA IN SITU OF BLADDER): ICD-10-CM

## 2019-07-08 ENCOUNTER — OFFICE VISIT (OUTPATIENT)
Dept: URGENT CARE | Age: 74
End: 2019-07-08
Payer: MEDICARE

## 2019-07-08 VITALS
HEART RATE: 90 BPM | RESPIRATION RATE: 18 BRPM | HEIGHT: 66 IN | DIASTOLIC BLOOD PRESSURE: 75 MMHG | OXYGEN SATURATION: 98 % | BODY MASS INDEX: 27.64 KG/M2 | WEIGHT: 172 LBS | TEMPERATURE: 98.3 F | SYSTOLIC BLOOD PRESSURE: 128 MMHG

## 2019-07-08 DIAGNOSIS — R30.0 DYSURIA: Primary | ICD-10-CM

## 2019-07-08 LAB
APPEARANCE FLUID: NORMAL
BILIRUBIN, POC: NEGATIVE
BLOOD URINE, POC: NEGATIVE
CLARITY, POC: CLEAR
COLOR, POC: YELLOW
GLUCOSE URINE, POC: NEGATIVE
KETONES, POC: NEGATIVE
LEUKOCYTE EST, POC: NEGATIVE
NITRITE, POC: NEGATIVE
PH, POC: 6
PROTEIN, POC: NEGATIVE
SPECIFIC GRAVITY, POC: 1.02
UROBILINOGEN, POC: 0.2

## 2019-07-08 PROCEDURE — 3017F COLORECTAL CA SCREEN DOC REV: CPT | Performed by: SPECIALIST

## 2019-07-08 PROCEDURE — G8400 PT W/DXA NO RESULTS DOC: HCPCS | Performed by: SPECIALIST

## 2019-07-08 PROCEDURE — 81002 URINALYSIS NONAUTO W/O SCOPE: CPT | Performed by: SPECIALIST

## 2019-07-08 PROCEDURE — 99213 OFFICE O/P EST LOW 20 MIN: CPT | Performed by: SPECIALIST

## 2019-07-08 PROCEDURE — G8427 DOCREV CUR MEDS BY ELIG CLIN: HCPCS | Performed by: SPECIALIST

## 2019-07-08 PROCEDURE — G8419 CALC BMI OUT NRM PARAM NOF/U: HCPCS | Performed by: SPECIALIST

## 2019-07-08 PROCEDURE — 1036F TOBACCO NON-USER: CPT | Performed by: SPECIALIST

## 2019-07-08 PROCEDURE — 1090F PRES/ABSN URINE INCON ASSESS: CPT | Performed by: SPECIALIST

## 2019-07-08 PROCEDURE — 1123F ACP DISCUSS/DSCN MKR DOCD: CPT | Performed by: SPECIALIST

## 2019-07-08 PROCEDURE — 4040F PNEUMOC VAC/ADMIN/RCVD: CPT | Performed by: SPECIALIST

## 2019-07-08 RX ORDER — FLUCONAZOLE 150 MG/1
150 TABLET ORAL DAILY
Qty: 3 TABLET | Refills: 0 | Status: SHIPPED | OUTPATIENT
Start: 2019-07-08 | End: 2019-07-11

## 2019-07-08 RX ORDER — LOSARTAN POTASSIUM 50 MG/1
50 TABLET ORAL
COMMUNITY
Start: 2018-05-07

## 2019-07-08 RX ORDER — GLIPIZIDE 5 MG/1
5 TABLET, FILM COATED, EXTENDED RELEASE ORAL
COMMUNITY
Start: 2018-05-07

## 2019-07-08 NOTE — PROGRESS NOTES
MG tablet Take 50 mg by mouth      fluconazole (DIFLUCAN) 150 MG tablet Take 1 tablet by mouth daily for 3 days 3 tablet 0    esomeprazole (NEXIUM) 40 MG capsule Take 1 capsule by mouth 2 times daily (before meals). 180 capsule 3    esomeprazole (NEXIUM) 40 MG capsule Take 40 mg by mouth every morning (before breakfast).  Coenzyme Q10 (COQ10 PO) Take  by mouth.  Multiple Vitamin (MULTI-VITAMIN PO) Take  by mouth.  GLUCOSAMINE PO Take  by mouth.  Metoprolol Succinate (TOPROL XL PO) Take  by mouth.  Choline Fenofibrate (TRILIPIX PO) Take  by mouth.  Cetirizine HCl (ZYRTEC PO) Take  by mouth. No current facility-administered medications for this visit. Allergies   Allergen Reactions    Codeine     Dexlansoprazole     Halothane     Lipitor     Mometasone Furoate     Phenytoin Sodium Extended     Sulfa Antibiotics     Zestril [Lisinopril]        Health Maintenance   Topic Date Due    Potassium monitoring  1945    Creatinine monitoring  1945    Hepatitis C screen  1945    DTaP/Tdap/Td vaccine (1 - Tdap) 06/11/1966    Lipid screen  06/11/1987    Breast cancer screen  06/11/1997    Shingles Vaccine (1 of 2) 06/11/1997    Colon Cancer Screen FIT/FOBT  05/20/2012    DEXA (modify frequency per FRAX score)  06/11/2012    Pneumococcal 65+ years Vaccine (1 of 2 - PCV13) 06/11/2012    Flu vaccine (1) 09/01/2019       Subjective:     Review of Systems   Genitourinary: Positive for dysuria. Negative for pelvic pain. OBJECTIVE:     Physical Exam   Constitutional: She appears well-developed and well-nourished. HENT:   Head: Normocephalic and atraumatic. Nursing note and vitals reviewed. /75   Pulse 90   Temp 98.3 °F (36.8 °C)   Resp 18   Ht 5' 6\" (1.676 m)   Wt 172 lb (78 kg)   SpO2 98%   BMI 27.76 kg/m²     ASSESSMENT:       Diagnosis Orders   1.  Dysuria  POCT Urinalysis no Micro    fluconazole (DIFLUCAN) 150 MG Care Instructions. \"     If you do not have an account, please click on the \"Sign Up Now\" link. Current as of: December 19, 2018  Content Version: 12.0  © 6310-9429 [employer]. Care instructions adapted under license by [facility] ([facility]). If you have questions about a medical condition or this instruction, always ask your healthcare professional. [place] [address] any warranty or liability for your use of this information. 1.  Push water  2. Monitor your blood sugars closely  3.   If your symptoms continue, you will need to contact [facility]        Electronically signed by CATHERINE Ray NP on 7/8/2019 at 11:48 AM

## 2019-07-12 ENCOUNTER — RESULTS ENCOUNTER (OUTPATIENT)
Dept: UROLOGY | Facility: CLINIC | Age: 74
End: 2019-07-12

## 2019-07-12 DIAGNOSIS — D09.0 CIS (CARCINOMA IN SITU OF BLADDER): ICD-10-CM

## 2019-07-15 ENCOUNTER — TELEPHONE (OUTPATIENT)
Dept: UROLOGY | Facility: CLINIC | Age: 74
End: 2019-07-15

## 2019-07-15 NOTE — TELEPHONE ENCOUNTER
Pt came in stating that she has pain in the vagina area and burning when she wipes.  She went to [facility] on 7- and they stated no UTI was found.  She stated she has had this problem since the BCG. Wants to be seen today.    Asked Alison she stated to ask Jessica if she can be on schedule it could be the BCG.    Jessica stated that she needs to follow up with the PCP or GYN since there is no UTI.    I explained to pt and she unhappily stated she would be back to see [place] on August 2nd for her scheduled appt.    Thanks.

## 2019-07-19 ENCOUNTER — RESULTS ENCOUNTER (OUTPATIENT)
Dept: UROLOGY | Facility: CLINIC | Age: 74
End: 2019-07-19

## 2019-07-19 DIAGNOSIS — D09.0 CIS (CARCINOMA IN SITU OF BLADDER): ICD-10-CM

## 2019-07-26 ENCOUNTER — RESULTS ENCOUNTER (OUTPATIENT)
Dept: UROLOGY | Facility: CLINIC | Age: 74
End: 2019-07-26

## 2019-07-26 DIAGNOSIS — D09.0 CIS (CARCINOMA IN SITU OF BLADDER): ICD-10-CM

## 2019-08-02 ENCOUNTER — PROCEDURE VISIT (OUTPATIENT)
Dept: UROLOGY | Facility: CLINIC | Age: 74
End: 2019-08-02

## 2019-08-02 ENCOUNTER — RESULTS ENCOUNTER (OUTPATIENT)
Dept: UROLOGY | Facility: CLINIC | Age: 74
End: 2019-08-02

## 2019-08-02 DIAGNOSIS — D09.0 CIS (CARCINOMA IN SITU OF BLADDER): Primary | ICD-10-CM

## 2019-08-02 DIAGNOSIS — D09.0 CIS (CARCINOMA IN SITU OF BLADDER): ICD-10-CM

## 2019-08-02 LAB
BILIRUB BLD-MCNC: NEGATIVE MG/DL
CLARITY, POC: CLEAR
COLOR UR: YELLOW
GLUCOSE UR STRIP-MCNC: NEGATIVE MG/DL
KETONES UR QL: NEGATIVE
LEUKOCYTE EST, POC: NEGATIVE
NITRITE UR-MCNC: NEGATIVE MG/ML
PH UR: 6 [PH] (ref 5–8)
PROT UR STRIP-MCNC: NEGATIVE MG/DL
RBC # UR STRIP: NEGATIVE /UL
SP GR UR: 1.01 (ref 1–1.03)
UROBILINOGEN UR QL: NORMAL

## 2019-08-02 PROCEDURE — 52000 CYSTOURETHROSCOPY: CPT | Performed by: UROLOGY

## 2019-08-02 PROCEDURE — 81003 URINALYSIS AUTO W/O SCOPE: CPT | Performed by: UROLOGY

## 2019-08-02 NOTE — PROGRESS NOTES
CC: I am here for the doctor to look at my bladder    Cystoscopy procedure note  Pre- operative diagnosis:  Bladder cancer surveillance    Post operative diagnosis:  Same    Procedure:  The patient was prepped and draped in a normal sterile fashion.  The urethra was anesthetized with 2% lidocaine jelly.  A flexible cystoscope was introduced per urethra.   The urethra is normal in appearance without obstruction or mass.  The bladder is without evidence of mucosal lesion.   There is no abnormality of the urothelium.  There is minimal trabeculation of the detrusor muscle.  The ureteral orifices are orthotopic in position and they efflux clear urine.    Patient tolerated the procedure well    Complications: none    Blood loss: minimal    Diagnoses and all orders for this visit:    CIS (carcinoma in situ of bladder)  -     POC Urinalysis Dipstick, Multipro              Follow up:    Routine follow up    Reece Lin MD  8/2/2019  9:31 AM

## 2019-09-05 ENCOUNTER — OFFICE VISIT (OUTPATIENT)
Dept: NEUROSURGERY | Facility: CLINIC | Age: 74
End: 2019-09-05

## 2019-09-05 VITALS
HEIGHT: 66 IN | SYSTOLIC BLOOD PRESSURE: 128 MMHG | WEIGHT: 174 LBS | DIASTOLIC BLOOD PRESSURE: 68 MMHG | BODY MASS INDEX: 27.97 KG/M2

## 2019-09-05 DIAGNOSIS — S22.080A T12 COMPRESSION FRACTURE (HCC): Primary | ICD-10-CM

## 2019-09-05 DIAGNOSIS — Z78.9 NON-SMOKER: ICD-10-CM

## 2019-09-05 PROCEDURE — 99214 OFFICE O/P EST MOD 30 MIN: CPT | Performed by: NURSE PRACTITIONER

## 2019-09-05 RX ORDER — PANTOPRAZOLE SODIUM 40 MG/1
40 TABLET, DELAYED RELEASE ORAL DAILY
Refills: 3 | Status: ON HOLD | COMMUNITY
Start: 2019-07-15 | End: 2021-09-28

## 2019-09-05 RX ORDER — DIPHENHYDRAMINE HCL 25 MG
1 CAPSULE ORAL DAILY
Status: ON HOLD | COMMUNITY
End: 2021-09-20

## 2019-09-05 NOTE — PROGRESS NOTES
Chief complaint:   Chief Complaint   Patient presents with   • Back Pain     Pam has been referred here today from Dr. Nicole for what she states is a compression fracture after a fall a couple of weeks ago.  Her images were done at [facility].         Subjective     HPI: Is a 74-year-old female patient who was referred to us by Dr. Hung Nicole for back pain after she sustained a fall 2 weeks ago.  She is here to be evaluated today.  Patient states that she was getting out of her vehicle when she slipped on some wet concrete and fell and hit the running board with her back and then landed on her left buttocks region.  She says that the pain was immediate in onset.  The pain is constant.  It is better when she is sitting and doing nothing and worse when she is moving.  Denies any lower extremity pain but does have some pain in her left buttocks and numbness going into her left thigh.  Denies any bowel or bladder incontinence.  Rates her pain on a scale of 0-10 out of 10.  She says it does interfere with actives of daily living.  She does feel that she is at least 60% better compared to what it was when it was initially brought on.  She is right-hand dominant.  She is retired.  She is .  Denies any tobacco, alcohol, or illicit drug use.  Medical history includes bladder cancer diabetes and hypertension.    Review of Systems   Constitutional: Positive for activity change and fatigue.   Genitourinary: Positive for pelvic pain.   Musculoskeletal: Positive for back pain.   Psychiatric/Behavioral: Positive for sleep disturbance.   All other systems reviewed and are negative.       Past Medical History:   Diagnosis Date   • Arthritis    • Bladder cancer (CMS/HCC)     Bladder cancer   • Diabetes mellitus (CMS/HCC)    • GERD (gastroesophageal reflux disease)    • Hard of hearing    • Hx of excision of tumor of brain meninges    • Hypertension    • UTI (urinary tract infection)      Past Surgical History:  "  Procedure Laterality Date   • APPENDECTOMY     • BRAIN TUMOR EXCISION     • CHOLECYSTECTOMY     • CYSTOSCOPY BLADDER BIOPSY N/A 7/25/2018    Procedure: CYSTOSCOPY BILATERAL RETROGRADE PYELOGRAM BLADDER BIOPSIES WITH FULGURATION;  Surgeon: Rj Tijerina MD;  Location:  [facility] OR;  Service: Urology   • CYSTOSCOPY BLADDER BIOPSY N/A 10/31/2018    Procedure: CYSTOSCOPY BLADDER BIOPSY WITH FULGURATION;  Surgeon: Rj Tijerina MD;  Location:  [facility] OR;  Service: Urology   • HYSTERECTOMY     • TOTAL KNEE ARTHROPLASTY Bilateral      Family History   Problem Relation Age of Onset   • No Known Problems Father    • Cancer Mother    • Diabetes Mother    • Heart disease Mother      Social History     Tobacco Use   • Smoking status: Never Smoker   • Smokeless tobacco: Never Used   Substance Use Topics   • Alcohol use: No   • Drug use: No       (Not in a hospital admission)  Allergies:  Halothane; Atorvastatin; Cortisone; Lisinopril; Mometasone furoate; Sulfa antibiotics; Codeine; Cortisol manager [cholestatin]; Dexlansoprazole; Levaquin [levofloxacin]; and Phenytoin sodium extended    Objective      Vital Signs  /68 (BP Location: Right arm, Patient Position: Sitting)   Ht 167.6 cm (66\")   Wt 78.9 kg (174 lb)   BMI 28.08 kg/m²     Physical Exam   Constitutional: She is oriented to person, place, and time. She appears well-developed and well-nourished.   HENT:   Head: Normocephalic.   Eyes: Conjunctivae, EOM and lids are normal. Pupils are equal, round, and reactive to light.   Neck: Normal range of motion.   Cardiovascular: Normal rate, regular rhythm and normal heart sounds.   Pulmonary/Chest: Effort normal and breath sounds normal.   Abdominal: Normal appearance.   Musculoskeletal: Normal range of motion.   Neurological: She is alert and oriented to person, place, and time. She has normal strength and normal reflexes. She displays normal reflexes. No cranial nerve deficit or sensory deficit. GCS eye " subscore is 4. GCS verbal subscore is 5. GCS motor subscore is 6.   Skin: Skin is warm.   Psychiatric: She has a normal mood and affect. Her speech is normal and behavior is normal. Thought content normal. Cognition and memory are normal.       Results Review: X-rays of the lumbar spine show a compression fracture at T12.        Assessment/Plan: At this point I am going to send the patient for an LSO brace to allow her to wear for the next couple of weeks.  We will follow-up again with her in 2 weeks to see how she is doing.  If she continues to make improvement we can continue to treat her conservatively however she does not make any improvements over the next couple of weeks we will send her for nuclear med bone scan to check the acuity of the fracture as she is not able to have an MRI.  Depending on those results we will determine if we need to consider kyphoplasty or not.  BMI shows that the patient is overweight.  Be much chart was given to the patient.  She is a non-smoker    Pam was seen today for back pain.    Diagnoses and all orders for this visit:    T12 compression fracture (CMS/HCC)    Non-smoker    BMI 28.0-28.9,adult          I discussed the patients findings and my recommendations with patient    Diony Ho, ANH  09/05/19  1:48 PM

## 2019-09-05 NOTE — PATIENT INSTRUCTIONS

## 2019-09-24 ENCOUNTER — OFFICE VISIT (OUTPATIENT)
Dept: NEUROSURGERY | Facility: CLINIC | Age: 74
End: 2019-09-24

## 2019-09-24 VITALS
BODY MASS INDEX: 27.45 KG/M2 | HEIGHT: 66 IN | DIASTOLIC BLOOD PRESSURE: 58 MMHG | WEIGHT: 170.8 LBS | SYSTOLIC BLOOD PRESSURE: 108 MMHG

## 2019-09-24 DIAGNOSIS — Z78.9 NON-SMOKER: ICD-10-CM

## 2019-09-24 DIAGNOSIS — S22.080D CLOSED WEDGE COMPRESSION FRACTURE OF ELEVENTH THORACIC VERTEBRA WITH ROUTINE HEALING, SUBSEQUENT ENCOUNTER: Primary | ICD-10-CM

## 2019-09-24 PROCEDURE — 99213 OFFICE O/P EST LOW 20 MIN: CPT | Performed by: NURSE PRACTITIONER

## 2019-09-24 NOTE — PATIENT INSTRUCTIONS

## 2019-09-24 NOTE — PROGRESS NOTES
Chief complaint:   Chief Complaint   Patient presents with   • Back Pain     Pam is returning for follow up for her T12 compression fracture, she states her pain is much better today.        Subjective     HPI: This a 74-year-old female patient who was referred to us by Dr. Hung Nicole for back pain after she sustained a fall 2 weeks ago.  She is here to be evaluated today.  Patient states that she was getting out of her vehicle when she slipped on some wet concrete and fell and hit the running board with her back and then landed on her left buttocks region.  She says that the pain was immediate in onset.  The pain is constant.  It is better when she is sitting and doing nothing and worse when she is moving.  Denies any lower extremity pain but does have some pain in her left buttocks and numbness going into her left thigh.  Denies any bowel or bladder incontinence.  Rates her pain on a scale of 0-10 out of 10.  She says it does interfere with actives of daily living.  She does feel that she is at least 60% better compared to what it was when it was initially brought on.  She is right-hand dominant.  She is retired.  She is .  Denies any tobacco, alcohol, or illicit drug use.  Medical history includes bladder cancer diabetes and hypertension.    She states she was not able to tolerate the brace much but she says at this point she is about 85% better than what she was initially from all the accident.  Denies any lower extremity pain or numbness and tingling.    Review of Systems   Constitutional: Positive for activity change and fatigue.   Genitourinary: Positive for pelvic pain.   Musculoskeletal: Positive for back pain.   Psychiatric/Behavioral: Positive for sleep disturbance.   All other systems reviewed and are negative.       Past Medical History:   Diagnosis Date   • Arthritis    • Bladder cancer (CMS/[facility])     Bladder cancer   • Diabetes mellitus (CMS/[facility])    • GERD (gastroesophageal reflux  "disease)    • Hard of hearing    • Hx of excision of tumor of brain meninges    • Hypertension    • UTI (urinary tract infection)      Past Surgical History:   Procedure Laterality Date   • APPENDECTOMY     • BRAIN TUMOR EXCISION     • CHOLECYSTECTOMY     • CYSTOSCOPY BLADDER BIOPSY N/A 7/25/2018    Procedure: CYSTOSCOPY BILATERAL RETROGRADE PYELOGRAM BLADDER BIOPSIES WITH FULGURATION;  Surgeon: Rj Tijerina MD;  Location: [facility] OR;  Service: Urology   • CYSTOSCOPY BLADDER BIOPSY N/A 10/31/2018    Procedure: CYSTOSCOPY BLADDER BIOPSY WITH FULGURATION;  Surgeon: Rj Tijerina MD;  Location:  [facility] OR;  Service: Urology   • HYSTERECTOMY     • TOTAL KNEE ARTHROPLASTY Bilateral      Family History   Problem Relation Age of Onset   • No Known Problems Father    • Cancer Mother    • Diabetes Mother    • Heart disease Mother      Social History     Tobacco Use   • Smoking status: Never Smoker   • Smokeless tobacco: Never Used   Substance Use Topics   • Alcohol use: No   • Drug use: No       (Not in a hospital admission)  Allergies:  Halothane; Atorvastatin; Cortisone; Lisinopril; Mometasone furoate; Sulfa antibiotics; Codeine; Cortisol manager [cholestatin]; Dexlansoprazole; Levaquin [levofloxacin]; and Phenytoin sodium extended    Objective      Vital Signs  /58 (BP Location: Right arm, Patient Position: Sitting)   Ht 167.6 cm (66\")   Wt 77.5 kg (170 lb 12.8 oz)   BMI 27.57 kg/m²     Physical Exam   Constitutional: She is oriented to person, place, and time. She appears well-developed and well-nourished.   HENT:   Head: Normocephalic.   Eyes: Conjunctivae, EOM and lids are normal. Pupils are equal, round, and reactive to light.   Neck: Normal range of motion.   Cardiovascular: Normal rate, regular rhythm and normal heart sounds.   Pulmonary/Chest: Effort normal and breath sounds normal.   Abdominal: Normal appearance.   Musculoskeletal: Normal range of motion.   Neurological: She is alert and " oriented to person, place, and time. She has normal strength and normal reflexes. She displays normal reflexes. No cranial nerve deficit or sensory deficit. GCS eye subscore is 4. GCS verbal subscore is 5. GCS motor subscore is 6.   Skin: Skin is warm.   Psychiatric: She has a normal mood and affect. Her speech is normal and behavior is normal. Thought content normal. Cognition and memory are normal.       Results Review: No new imaging        Assessment/Plan: The patient is doing well at this time.  She is leaving on Monday and going to [place] for 7 months.  She is going to keep her activities limited for another month and see how she does and if there is no improvement she says that she will try and go see somebody while she is down there.  She was told if he did have any problems to give us a call.  She can try and wear the brace as she can and keep her activities limited.  Patient is a non-smoker  BMI shows the patient is Overweight. BMI chart was given to the patient.     Pam was seen today for back pain.    Diagnoses and all orders for this visit:    Closed wedge compression fracture of eleventh thoracic vertebra with routine healing, subsequent encounter    Non-smoker    BMI 27.0-27.9,adult          I discussed the patients findings and my recommendations with patient    Diony Ho, ANH  09/24/19  3:34 PM

## 2019-12-22 ENCOUNTER — NURSE TRIAGE (OUTPATIENT)
Dept: CALL CENTER | Facility: HOSPITAL | Age: 74
End: 2019-12-22

## 2019-12-22 NOTE — TELEPHONE ENCOUNTER
Her blood sugar at 0900- 358, Then after lunch it was 398. She states she has been coughing, and spitting up something white and thick.     Reason for Disposition  • [1] Blood glucose > 240 mg/dL (13.3 mmol/L) AND [2] urine ketones moderate-large (or more than 1+)    Additional Information  • Negative: Unconscious or difficult to awaken  • Negative: Acting confused (e.g., disoriented, slurred speech)  • Negative: Very weak (e.g., can't stand)  • Negative: Sounds like a life-threatening emergency to the triager  • Negative: [1] Vomiting AND [2] signs of dehydration (e.g., very dry mouth, lightheaded, dark urine)  • Negative: [1] Blood glucose > 240 mg/dL (13.3 mmol/L) AND [2] rapid breathing  • Negative: Blood glucose > 500 mg/dL (27.8 mmol/L)  • Negative: [1] Blood glucose > 240 mg/dL (13.3 mmol/L) AND [2] blood ketones > 1.4 mmol/L  • Negative: [1] Blood glucose > 240 mg/dL (13.3 mmol/L) AND [2] vomiting AND [3] unable to check for ketones (in blood or urine)  • Negative: [1] New onset Diabetes suspected (e.g., frequent urination, weak, weight loss) AND [2] vomiting or rapid breathing  • Negative: Vomiting lasts > 4 hours  • Negative: Patient sounds very sick or weak to the triager  • Negative: Fever > 100.5 F (38.1 C)  • Negative: Blood glucose > 400 mg/dL (22.2 mmol/L)  • Negative: [1] Blood glucose > 300 mg/dL (16.7 mmol/L) AND [2] two or more times in a row  • Negative: Urine ketones moderate - large (or blood ketones > 1.4 mmol/L)  • Negative: [1] Caller has URGENT medication or insulin pump question AND [2] triager unable to answer question  • Negative: [1] Symptoms of high blood sugar (e.g., frequent urination, weak, weight loss) AND [2] not able to test blood glucose  • Negative: New onset diabetes suspected (e.g., frequent urination, weakness, weight loss)  • Negative: [1] Caller has NON-URGENT medication or insulin pump question AND [2] triager unable to answer question    Answer Assessment - Initial  "Assessment Questions  1. BLOOD GLUCOSE: \"What is your blood glucose level?\"       398  2. ONSET: \"When did you check the blood glucose?\"      She did take it about 30 minutes ago.   3. USUAL RANGE: \"What is your glucose level usually?\" (e.g., usual fasting morning value, usual evening value)      The blood sugar 398.  4. KETONES: \"Do you check for ketones (urine or blood test strips)?\" If yes, ask: \"What does the test show now?\"       no  5. TYPE 1 or 2:  \"Do you know what type of diabetes you have?\"  (e.g., Type 1, Type 2, Gestational; doesn't know)       She is on insulin.   6. INSULIN: \"Do you take insulin?\" \"What type of insulin(s) do you use? What is the mode of delivery? (syringe, pen (e.g., injection or  pump)?\"       Yes- Lantus.   7. DIABETES PILLS: \"Do you take any pills for your diabetes?\" If yes, ask: \"Have you missed taking any pills recently?\"      n/a  8. OTHER SYMPTOMS: \"Do you have any symptoms?\" (e.g., fever, frequent urination, difficulty breathing, dizziness, weakness, vomiting)      Shaky does not feel well.   9. PREGNANCY: \"Is there any chance you are pregnant?\" \"When was your last menstrual period?\"      n/a    Protocols used: DIABETES - HIGH BLOOD SUGAR-ADULT-AH      "

## 2021-05-06 ENCOUNTER — TRANSCRIBE ORDERS (OUTPATIENT)
Dept: ADMINISTRATIVE | Facility: HOSPITAL | Age: 76
End: 2021-05-06

## 2021-05-06 DIAGNOSIS — R94.31 ABNORMAL ELECTROCARDIOGRAM: ICD-10-CM

## 2021-05-06 DIAGNOSIS — R00.2 PALPITATIONS: Primary | ICD-10-CM

## 2021-05-07 ENCOUNTER — HOSPITAL ENCOUNTER (OUTPATIENT)
Dept: CARDIOLOGY | Facility: HOSPITAL | Age: 76
Discharge: HOME | End: 2021-05-07

## 2021-05-07 DIAGNOSIS — R00.2 PALPITATIONS: ICD-10-CM

## 2021-05-07 DIAGNOSIS — R94.31 ABNORMAL ELECTROCARDIOGRAM: ICD-10-CM

## 2021-05-19 ENCOUNTER — LAB (OUTPATIENT)
Dept: LAB | Facility: HOSPITAL | Age: 76
End: 2021-05-19

## 2021-05-19 ENCOUNTER — TRANSCRIBE ORDERS (OUTPATIENT)
Dept: ADMINISTRATIVE | Facility: HOSPITAL | Age: 76
End: 2021-05-19

## 2021-05-19 DIAGNOSIS — K21.9 GASTROESOPHAGEAL REFLUX DISEASE WITHOUT ESOPHAGITIS: ICD-10-CM

## 2021-05-19 DIAGNOSIS — R00.2 PALPITATIONS: ICD-10-CM

## 2021-05-19 DIAGNOSIS — E11.69 TYPE 2 DIABETES MELLITUS WITH OTHER SPECIFIED COMPLICATION, UNSPECIFIED WHETHER LONG TERM INSULIN USE (HCC): Primary | ICD-10-CM

## 2021-05-19 DIAGNOSIS — E11.69 TYPE 2 DIABETES MELLITUS WITH OTHER SPECIFIED COMPLICATION, UNSPECIFIED WHETHER LONG TERM INSULIN USE (HCC): ICD-10-CM

## 2021-05-19 DIAGNOSIS — I10 ESSENTIAL (PRIMARY) HYPERTENSION: ICD-10-CM

## 2021-05-19 DIAGNOSIS — C67.9 MALIGNANT NEOPLASM OF URINARY BLADDER, UNSPECIFIED SITE (HCC): ICD-10-CM

## 2021-05-19 DIAGNOSIS — M15.0 PRIMARY GENERALIZED (OSTEO)ARTHRITIS: ICD-10-CM

## 2021-05-19 LAB
ALBUMIN SERPL-MCNC: 4.3 G/DL (ref 3.5–5.2)
ALBUMIN UR-MCNC: <1.2 MG/DL
ALBUMIN/GLOB SERPL: 1.3 G/DL
ALP SERPL-CCNC: 111 U/L (ref 39–117)
ALT SERPL W P-5'-P-CCNC: 15 U/L (ref 1–33)
ANION GAP SERPL CALCULATED.3IONS-SCNC: 8 MMOL/L (ref 5–15)
AST SERPL-CCNC: 18 U/L (ref 1–32)
BACTERIA UR QL AUTO: ABNORMAL /HPF
BASOPHILS # BLD AUTO: 0.1 10*3/MM3 (ref 0–0.2)
BASOPHILS NFR BLD AUTO: 1.1 % (ref 0–1.5)
BILIRUB SERPL-MCNC: 0.7 MG/DL (ref 0–1.2)
BILIRUB UR QL STRIP: NEGATIVE
BUN SERPL-MCNC: 13 MG/DL (ref 8–23)
BUN/CREAT SERPL: 15.3 (ref 7–25)
CALCIUM SPEC-SCNC: 9.6 MG/DL (ref 8.6–10.5)
CHLORIDE SERPL-SCNC: 106 MMOL/L (ref 98–107)
CHOLEST SERPL-MCNC: 196 MG/DL (ref 0–200)
CLARITY UR: CLEAR
CO2 SERPL-SCNC: 28 MMOL/L (ref 22–29)
COLOR UR: YELLOW
CREAT SERPL-MCNC: 0.85 MG/DL (ref 0.57–1)
DEPRECATED RDW RBC AUTO: 42.5 FL (ref 37–54)
EOSINOPHIL # BLD AUTO: 0.3 10*3/MM3 (ref 0–0.4)
EOSINOPHIL NFR BLD AUTO: 3.3 % (ref 0.3–6.2)
ERYTHROCYTE [DISTWIDTH] IN BLOOD BY AUTOMATED COUNT: 13.2 % (ref 12.3–15.4)
GFR SERPL CREATININE-BSD FRML MDRD: 66 ML/MIN/1.73
GLOBULIN UR ELPH-MCNC: 3.2 GM/DL
GLUCOSE SERPL-MCNC: 132 MG/DL (ref 65–99)
GLUCOSE UR STRIP-MCNC: NEGATIVE MG/DL
HBA1C MFR BLD: 7.1 % (ref 4.8–5.6)
HCT VFR BLD AUTO: 44.3 % (ref 34–46.6)
HDLC SERPL-MCNC: 33 MG/DL (ref 40–60)
HGB BLD-MCNC: 14.2 G/DL (ref 12–15.9)
HGB UR QL STRIP.AUTO: NEGATIVE
HYALINE CASTS UR QL AUTO: ABNORMAL /LPF
IMM GRANULOCYTES # BLD AUTO: 0.03 10*3/MM3 (ref 0–0.05)
IMM GRANULOCYTES NFR BLD AUTO: 0.3 % (ref 0–0.5)
KETONES UR QL STRIP: NEGATIVE
LDLC SERPL CALC-MCNC: 120 MG/DL (ref 0–100)
LDLC/HDLC SERPL: 3.46 {RATIO}
LEUKOCYTE ESTERASE UR QL STRIP.AUTO: ABNORMAL
LYMPHOCYTES # BLD AUTO: 3.07 10*3/MM3 (ref 0.7–3.1)
LYMPHOCYTES NFR BLD AUTO: 34 % (ref 19.6–45.3)
MCH RBC QN AUTO: 28.1 PG (ref 26.6–33)
MCHC RBC AUTO-ENTMCNC: 32.1 G/DL (ref 31.5–35.7)
MCV RBC AUTO: 87.7 FL (ref 79–97)
MONOCYTES # BLD AUTO: 0.55 10*3/MM3 (ref 0.1–0.9)
MONOCYTES NFR BLD AUTO: 6.1 % (ref 5–12)
NEUTROPHILS NFR BLD AUTO: 4.97 10*3/MM3 (ref 1.7–7)
NEUTROPHILS NFR BLD AUTO: 55.2 % (ref 42.7–76)
NITRITE UR QL STRIP: NEGATIVE
NRBC BLD AUTO-RTO: 0 /100 WBC (ref 0–0.2)
PH UR STRIP.AUTO: 6 [PH] (ref 5–8)
PLATELET # BLD AUTO: 248 10*3/MM3 (ref 140–450)
PMV BLD AUTO: 11.2 FL (ref 6–12)
POTASSIUM SERPL-SCNC: 4.7 MMOL/L (ref 3.5–5.2)
PROT SERPL-MCNC: 7.5 G/DL (ref 6–8.5)
PROT UR QL STRIP: NEGATIVE
RBC # BLD AUTO: 5.05 10*6/MM3 (ref 3.77–5.28)
RBC # UR: ABNORMAL /HPF
REF LAB TEST METHOD: ABNORMAL
SODIUM SERPL-SCNC: 142 MMOL/L (ref 136–145)
SP GR UR STRIP: 1.02 (ref 1–1.03)
SQUAMOUS #/AREA URNS HPF: ABNORMAL /HPF
T4 FREE SERPL-MCNC: 1.16 NG/DL (ref 0.93–1.7)
TRIGL SERPL-MCNC: 244 MG/DL (ref 0–150)
TSH SERPL DL<=0.05 MIU/L-ACNC: 2.65 UIU/ML (ref 0.27–4.2)
UROBILINOGEN UR QL STRIP: ABNORMAL
VLDLC SERPL-MCNC: 43 MG/DL (ref 5–40)
WBC # BLD AUTO: 9.02 10*3/MM3 (ref 3.4–10.8)
WBC UR QL AUTO: ABNORMAL /HPF

## 2021-05-19 PROCEDURE — 80053 COMPREHEN METABOLIC PANEL: CPT

## 2021-05-19 PROCEDURE — 82043 UR ALBUMIN QUANTITATIVE: CPT

## 2021-05-19 PROCEDURE — 85025 COMPLETE CBC W/AUTO DIFF WBC: CPT

## 2021-05-19 PROCEDURE — 36415 COLL VENOUS BLD VENIPUNCTURE: CPT

## 2021-05-19 PROCEDURE — 80061 LIPID PANEL: CPT

## 2021-05-19 PROCEDURE — 84439 ASSAY OF FREE THYROXINE: CPT

## 2021-05-19 PROCEDURE — 83036 HEMOGLOBIN GLYCOSYLATED A1C: CPT

## 2021-05-19 PROCEDURE — 81001 URINALYSIS AUTO W/SCOPE: CPT

## 2021-05-19 PROCEDURE — 84443 ASSAY THYROID STIM HORMONE: CPT

## 2021-05-31 LAB
MAXIMAL PREDICTED HEART RATE: 147 BPM
STRESS TARGET HR: 125 BPM

## 2021-09-09 ENCOUNTER — TRANSCRIBE ORDERS (OUTPATIENT)
Dept: LAB | Facility: HOSPITAL | Age: 76
End: 2021-09-09

## 2021-09-09 ENCOUNTER — TRANSCRIBE ORDERS (OUTPATIENT)
Dept: ADMINISTRATIVE | Facility: HOSPITAL | Age: 76
End: 2021-09-09

## 2021-09-09 ENCOUNTER — LAB (OUTPATIENT)
Dept: LAB | Facility: HOSPITAL | Age: 76
End: 2021-09-09

## 2021-09-09 DIAGNOSIS — E11.69 DIABETES MELLITUS ASSOCIATED WITH HORMONAL ETIOLOGY (HCC): ICD-10-CM

## 2021-09-09 DIAGNOSIS — E11.69 DIABETES MELLITUS ASSOCIATED WITH HORMONAL ETIOLOGY (HCC): Primary | ICD-10-CM

## 2021-09-09 LAB
ALBUMIN SERPL-MCNC: 4.4 G/DL (ref 3.5–5.2)
ALBUMIN/GLOB SERPL: 1.4 G/DL
ALP SERPL-CCNC: 101 U/L (ref 39–117)
ALT SERPL W P-5'-P-CCNC: 18 U/L (ref 1–33)
ANION GAP SERPL CALCULATED.3IONS-SCNC: 10 MMOL/L (ref 5–15)
AST SERPL-CCNC: 16 U/L (ref 1–32)
BILIRUB SERPL-MCNC: 0.3 MG/DL (ref 0–1.2)
BUN SERPL-MCNC: 10 MG/DL (ref 8–23)
BUN/CREAT SERPL: 12 (ref 7–25)
CALCIUM SPEC-SCNC: 9.9 MG/DL (ref 8.6–10.5)
CHLORIDE SERPL-SCNC: 106 MMOL/L (ref 98–107)
CO2 SERPL-SCNC: 25 MMOL/L (ref 22–29)
CREAT SERPL-MCNC: 0.83 MG/DL (ref 0.57–1)
GFR SERPL CREATININE-BSD FRML MDRD: 67 ML/MIN/1.73
GLOBULIN UR ELPH-MCNC: 3.2 GM/DL
GLUCOSE SERPL-MCNC: 138 MG/DL (ref 65–99)
HBA1C MFR BLD: 7.5 % (ref 4.8–5.6)
POTASSIUM SERPL-SCNC: 4.8 MMOL/L (ref 3.5–5.2)
PROT SERPL-MCNC: 7.6 G/DL (ref 6–8.5)
SODIUM SERPL-SCNC: 141 MMOL/L (ref 136–145)

## 2021-09-09 PROCEDURE — 36415 COLL VENOUS BLD VENIPUNCTURE: CPT

## 2021-09-09 PROCEDURE — 83036 HEMOGLOBIN GLYCOSYLATED A1C: CPT

## 2021-09-09 PROCEDURE — 80053 COMPREHEN METABOLIC PANEL: CPT

## 2021-09-20 ENCOUNTER — APPOINTMENT (OUTPATIENT)
Dept: GENERAL RADIOLOGY | Facility: HOSPITAL | Age: 76
End: 2021-09-20

## 2021-09-20 ENCOUNTER — HOSPITAL ENCOUNTER (INPATIENT)
Facility: HOSPITAL | Age: 76
LOS: 10 days | Discharge: HOME | End: 2021-09-30
Attending: FAMILY MEDICINE | Admitting: FAMILY MEDICINE

## 2021-09-20 DIAGNOSIS — U07.1 PNEUMONIA DUE TO COVID-19 VIRUS: Primary | ICD-10-CM

## 2021-09-20 DIAGNOSIS — R09.02 HYPOXEMIA: ICD-10-CM

## 2021-09-20 DIAGNOSIS — J12.82 PNEUMONIA DUE TO COVID-19 VIRUS: Primary | ICD-10-CM

## 2021-09-20 DIAGNOSIS — R53.1 WEAKNESS: ICD-10-CM

## 2021-09-20 PROBLEM — E11.9 TYPE 2 DIABETES MELLITUS, WITHOUT LONG-TERM CURRENT USE OF INSULIN (HCC): Status: ACTIVE | Noted: 2021-09-20

## 2021-09-20 PROBLEM — R19.5 LOOSE STOOLS: Status: ACTIVE | Noted: 2021-09-20

## 2021-09-20 PROBLEM — I10 ESSENTIAL HYPERTENSION: Status: ACTIVE | Noted: 2021-09-20

## 2021-09-20 LAB
ALBUMIN SERPL-MCNC: 3.8 G/DL (ref 3.5–5.2)
ALBUMIN/GLOB SERPL: 1.2 G/DL
ALP SERPL-CCNC: 72 U/L (ref 39–117)
ALT SERPL W P-5'-P-CCNC: 27 U/L (ref 1–33)
ANION GAP SERPL CALCULATED.3IONS-SCNC: 11 MMOL/L (ref 5–15)
ARTERIAL PATENCY WRIST A: POSITIVE
AST SERPL-CCNC: 45 U/L (ref 1–32)
ATMOSPHERIC PRESS: 750 MMHG
BASE EXCESS BLDA CALC-SCNC: -0.7 MMOL/L (ref 0–2)
BASOPHILS # BLD AUTO: 0.01 10*3/MM3 (ref 0–0.2)
BASOPHILS NFR BLD AUTO: 0.2 % (ref 0–1.5)
BDY SITE: ABNORMAL
BILIRUB SERPL-MCNC: 0.4 MG/DL (ref 0–1.2)
BODY TEMPERATURE: 37 C
BUN SERPL-MCNC: 10 MG/DL (ref 8–23)
BUN/CREAT SERPL: 12.5 (ref 7–25)
CALCIUM SPEC-SCNC: 8.6 MG/DL (ref 8.6–10.5)
CHLORIDE SERPL-SCNC: 106 MMOL/L (ref 98–107)
CO2 SERPL-SCNC: 22 MMOL/L (ref 22–29)
CREAT SERPL-MCNC: 0.8 MG/DL (ref 0.57–1)
CRP SERPL-MCNC: 4.86 MG/DL (ref 0–0.5)
D DIMER PPP FEU-MCNC: 0.71 MG/L (FEU) (ref 0–0.5)
D-LACTATE SERPL-SCNC: 1.3 MMOL/L (ref 0.5–2)
DEPRECATED RDW RBC AUTO: 44.4 FL (ref 37–54)
EOSINOPHIL # BLD AUTO: 0 10*3/MM3 (ref 0–0.4)
EOSINOPHIL NFR BLD AUTO: 0 % (ref 0.3–6.2)
ERYTHROCYTE [DISTWIDTH] IN BLOOD BY AUTOMATED COUNT: 13.9 % (ref 12.3–15.4)
FERRITIN SERPL-MCNC: 600.5 NG/ML (ref 13–150)
GFR SERPL CREATININE-BSD FRML MDRD: 70 ML/MIN/1.73
GLOBULIN UR ELPH-MCNC: 3.1 GM/DL
GLUCOSE BLDC GLUCOMTR-MCNC: 140 MG/DL (ref 70–130)
GLUCOSE BLDC GLUCOMTR-MCNC: 60 MG/DL (ref 70–130)
GLUCOSE BLDC GLUCOMTR-MCNC: 93 MG/DL (ref 70–130)
GLUCOSE SERPL-MCNC: 131 MG/DL (ref 65–99)
HCO3 BLDA-SCNC: 22.8 MMOL/L (ref 20–26)
HCT VFR BLD AUTO: 40.3 % (ref 34–46.6)
HGB BLD-MCNC: 13 G/DL (ref 12–15.9)
IMM GRANULOCYTES # BLD AUTO: 0.02 10*3/MM3 (ref 0–0.05)
IMM GRANULOCYTES NFR BLD AUTO: 0.4 % (ref 0–0.5)
LDH SERPL-CCNC: 339 U/L (ref 135–214)
LYMPHOCYTES # BLD AUTO: 1.17 10*3/MM3 (ref 0.7–3.1)
LYMPHOCYTES NFR BLD AUTO: 20.9 % (ref 19.6–45.3)
Lab: ABNORMAL
MCH RBC QN AUTO: 28.4 PG (ref 26.6–33)
MCHC RBC AUTO-ENTMCNC: 32.3 G/DL (ref 31.5–35.7)
MCV RBC AUTO: 88 FL (ref 79–97)
MODALITY: ABNORMAL
MONOCYTES # BLD AUTO: 0.52 10*3/MM3 (ref 0.1–0.9)
MONOCYTES NFR BLD AUTO: 9.3 % (ref 5–12)
NEUTROPHILS NFR BLD AUTO: 3.89 10*3/MM3 (ref 1.7–7)
NEUTROPHILS NFR BLD AUTO: 69.2 % (ref 42.7–76)
NRBC BLD AUTO-RTO: 0 /100 WBC (ref 0–0.2)
PCO2 BLDA: 33.6 MM HG (ref 35–45)
PCO2 TEMP ADJ BLD: 33.6 MM HG (ref 35–45)
PH BLDA: 7.44 PH UNITS (ref 7.35–7.45)
PH, TEMP CORRECTED: 7.44 PH UNITS (ref 7.35–7.45)
PLATELET # BLD AUTO: 158 10*3/MM3 (ref 140–450)
PMV BLD AUTO: 11.7 FL (ref 6–12)
PO2 BLDA: 61.4 MM HG (ref 83–108)
PO2 TEMP ADJ BLD: 61.4 MM HG (ref 83–108)
POTASSIUM SERPL-SCNC: 3.5 MMOL/L (ref 3.5–5.2)
PROCALCITONIN SERPL-MCNC: 0.07 NG/ML (ref 0–0.25)
PROT SERPL-MCNC: 6.9 G/DL (ref 6–8.5)
RBC # BLD AUTO: 4.58 10*6/MM3 (ref 3.77–5.28)
S PNEUM AG SPEC QL LA: NEGATIVE
SAO2 % BLDCOA: 93.7 % (ref 94–99)
SARS-COV-2 RNA PNL SPEC NAA+PROBE: DETECTED
SODIUM SERPL-SCNC: 139 MMOL/L (ref 136–145)
VENTILATOR MODE: ABNORMAL
WBC # BLD AUTO: 5.61 10*3/MM3 (ref 3.4–10.8)

## 2021-09-20 PROCEDURE — 99284 EMERGENCY DEPT VISIT MOD MDM: CPT

## 2021-09-20 PROCEDURE — 36600 WITHDRAWAL OF ARTERIAL BLOOD: CPT

## 2021-09-20 PROCEDURE — 82728 ASSAY OF FERRITIN: CPT | Performed by: FAMILY MEDICINE

## 2021-09-20 PROCEDURE — 94799 UNLISTED PULMONARY SVC/PX: CPT

## 2021-09-20 PROCEDURE — 83615 LACTATE (LD) (LDH) ENZYME: CPT | Performed by: FAMILY MEDICINE

## 2021-09-20 PROCEDURE — 25010000002 ENOXAPARIN PER 10 MG: Performed by: INTERNAL MEDICINE

## 2021-09-20 PROCEDURE — 87040 BLOOD CULTURE FOR BACTERIA: CPT | Performed by: FAMILY MEDICINE

## 2021-09-20 PROCEDURE — 82962 GLUCOSE BLOOD TEST: CPT

## 2021-09-20 PROCEDURE — 86140 C-REACTIVE PROTEIN: CPT | Performed by: FAMILY MEDICINE

## 2021-09-20 PROCEDURE — 63710000001 DIPHENHYDRAMINE PER 50 MG: Performed by: INTERNAL MEDICINE

## 2021-09-20 PROCEDURE — 82803 BLOOD GASES ANY COMBINATION: CPT

## 2021-09-20 PROCEDURE — 80053 COMPREHEN METABOLIC PANEL: CPT | Performed by: FAMILY MEDICINE

## 2021-09-20 PROCEDURE — 84145 PROCALCITONIN (PCT): CPT | Performed by: FAMILY MEDICINE

## 2021-09-20 PROCEDURE — 25010000002 DEXAMETHASONE PER 1 MG: Performed by: INTERNAL MEDICINE

## 2021-09-20 PROCEDURE — 87899 AGENT NOS ASSAY W/OPTIC: CPT | Performed by: FAMILY MEDICINE

## 2021-09-20 PROCEDURE — 71045 X-RAY EXAM CHEST 1 VIEW: CPT

## 2021-09-20 PROCEDURE — XW033E5 INTRODUCTION OF REMDESIVIR ANTI-INFECTIVE INTO PERIPHERAL VEIN, PERCUTANEOUS APPROACH, NEW TECHNOLOGY GROUP 5: ICD-10-PCS | Performed by: INTERNAL MEDICINE

## 2021-09-20 PROCEDURE — 94640 AIRWAY INHALATION TREATMENT: CPT

## 2021-09-20 PROCEDURE — 85025 COMPLETE CBC W/AUTO DIFF WBC: CPT | Performed by: FAMILY MEDICINE

## 2021-09-20 PROCEDURE — 85379 FIBRIN DEGRADATION QUANT: CPT | Performed by: FAMILY MEDICINE

## 2021-09-20 PROCEDURE — 83605 ASSAY OF LACTIC ACID: CPT | Performed by: FAMILY MEDICINE

## 2021-09-20 PROCEDURE — 87635 SARS-COV-2 COVID-19 AMP PRB: CPT | Performed by: FAMILY MEDICINE

## 2021-09-20 RX ORDER — DIPHENHYDRAMINE HCL 25 MG
25 CAPSULE ORAL NIGHTLY
Status: DISCONTINUED | OUTPATIENT
Start: 2021-09-20 | End: 2021-09-30 | Stop reason: HOSPADM

## 2021-09-20 RX ORDER — BENZONATATE 100 MG/1
200 CAPSULE ORAL 3 TIMES DAILY PRN
Status: DISCONTINUED | OUTPATIENT
Start: 2021-09-20 | End: 2021-09-30 | Stop reason: HOSPADM

## 2021-09-20 RX ORDER — ASCORBIC ACID 500 MG
500 TABLET ORAL DAILY
Status: DISCONTINUED | OUTPATIENT
Start: 2021-09-20 | End: 2021-09-30 | Stop reason: HOSPADM

## 2021-09-20 RX ORDER — LOPERAMIDE HYDROCHLORIDE 2 MG/1
2 CAPSULE ORAL 3 TIMES DAILY PRN
Status: DISCONTINUED | OUTPATIENT
Start: 2021-09-20 | End: 2021-09-30 | Stop reason: HOSPADM

## 2021-09-20 RX ORDER — ACETAMINOPHEN 160 MG/5ML
650 SOLUTION ORAL EVERY 4 HOURS PRN
Status: DISCONTINUED | OUTPATIENT
Start: 2021-09-20 | End: 2021-09-30 | Stop reason: HOSPADM

## 2021-09-20 RX ORDER — ZINC SULFATE 50(220)MG
220 CAPSULE ORAL DAILY
Status: DISCONTINUED | OUTPATIENT
Start: 2021-09-20 | End: 2021-09-30 | Stop reason: HOSPADM

## 2021-09-20 RX ORDER — ACETAMINOPHEN 650 MG/1
650 SUPPOSITORY RECTAL EVERY 4 HOURS PRN
Status: DISCONTINUED | OUTPATIENT
Start: 2021-09-20 | End: 2021-09-30 | Stop reason: HOSPADM

## 2021-09-20 RX ORDER — PANTOPRAZOLE SODIUM 40 MG/1
40 TABLET, DELAYED RELEASE ORAL DAILY
Status: DISCONTINUED | OUTPATIENT
Start: 2021-09-20 | End: 2021-09-30 | Stop reason: HOSPADM

## 2021-09-20 RX ORDER — SODIUM CHLORIDE 9 MG/ML
125 INJECTION, SOLUTION INTRAVENOUS CONTINUOUS
Status: DISCONTINUED | OUTPATIENT
Start: 2021-09-20 | End: 2021-09-20

## 2021-09-20 RX ORDER — SODIUM CHLORIDE 0.9 % (FLUSH) 0.9 %
10 SYRINGE (ML) INJECTION AS NEEDED
Status: DISCONTINUED | OUTPATIENT
Start: 2021-09-20 | End: 2021-09-30 | Stop reason: HOSPADM

## 2021-09-20 RX ORDER — GUAIFENESIN 600 MG/1
1200 TABLET, EXTENDED RELEASE ORAL EVERY 12 HOURS SCHEDULED
Status: DISCONTINUED | OUTPATIENT
Start: 2021-09-20 | End: 2021-09-30 | Stop reason: HOSPADM

## 2021-09-20 RX ORDER — CETIRIZINE HYDROCHLORIDE 10 MG/1
5 TABLET ORAL DAILY
Status: DISCONTINUED | OUTPATIENT
Start: 2021-09-20 | End: 2021-09-30 | Stop reason: HOSPADM

## 2021-09-20 RX ORDER — DEXTROSE MONOHYDRATE 25 G/50ML
25 INJECTION, SOLUTION INTRAVENOUS
Status: DISCONTINUED | OUTPATIENT
Start: 2021-09-20 | End: 2021-09-30 | Stop reason: HOSPADM

## 2021-09-20 RX ORDER — ALBUTEROL SULFATE 90 UG/1
2 AEROSOL, METERED RESPIRATORY (INHALATION)
Status: DISCONTINUED | OUTPATIENT
Start: 2021-09-20 | End: 2021-09-30 | Stop reason: HOSPADM

## 2021-09-20 RX ORDER — ASPIRIN 81 MG/1
81 TABLET ORAL DAILY
Status: DISCONTINUED | OUTPATIENT
Start: 2021-09-20 | End: 2021-09-30 | Stop reason: HOSPADM

## 2021-09-20 RX ORDER — ATORVASTATIN CALCIUM 10 MG/1
10 TABLET, FILM COATED ORAL NIGHTLY
Status: DISCONTINUED | OUTPATIENT
Start: 2021-09-20 | End: 2021-09-30 | Stop reason: HOSPADM

## 2021-09-20 RX ORDER — SODIUM CHLORIDE 9 MG/ML
75 INJECTION, SOLUTION INTRAVENOUS CONTINUOUS
Status: DISCONTINUED | OUTPATIENT
Start: 2021-09-20 | End: 2021-09-21

## 2021-09-20 RX ORDER — METOPROLOL SUCCINATE 50 MG/1
50 TABLET, EXTENDED RELEASE ORAL NIGHTLY
Status: DISCONTINUED | OUTPATIENT
Start: 2021-09-20 | End: 2021-09-30 | Stop reason: HOSPADM

## 2021-09-20 RX ORDER — MELATONIN
1000 DAILY
Status: DISCONTINUED | OUTPATIENT
Start: 2021-09-20 | End: 2021-09-30 | Stop reason: HOSPADM

## 2021-09-20 RX ORDER — SODIUM CHLORIDE 0.9 % (FLUSH) 0.9 %
10 SYRINGE (ML) INJECTION EVERY 12 HOURS SCHEDULED
Status: DISCONTINUED | OUTPATIENT
Start: 2021-09-20 | End: 2021-09-30 | Stop reason: HOSPADM

## 2021-09-20 RX ORDER — DEXAMETHASONE SODIUM PHOSPHATE 10 MG/ML
6 INJECTION INTRAMUSCULAR; INTRAVENOUS ONCE
Status: COMPLETED | OUTPATIENT
Start: 2021-09-20 | End: 2021-09-20

## 2021-09-20 RX ORDER — ONDANSETRON 2 MG/ML
4 INJECTION INTRAMUSCULAR; INTRAVENOUS EVERY 6 HOURS PRN
Status: DISCONTINUED | OUTPATIENT
Start: 2021-09-20 | End: 2021-09-30 | Stop reason: HOSPADM

## 2021-09-20 RX ORDER — NICOTINE POLACRILEX 4 MG
15 LOZENGE BUCCAL
Status: DISCONTINUED | OUTPATIENT
Start: 2021-09-20 | End: 2021-09-30 | Stop reason: HOSPADM

## 2021-09-20 RX ORDER — ACETAMINOPHEN 325 MG/1
650 TABLET ORAL EVERY 4 HOURS PRN
Status: DISCONTINUED | OUTPATIENT
Start: 2021-09-20 | End: 2021-09-30 | Stop reason: HOSPADM

## 2021-09-20 RX ADMIN — METOPROLOL SUCCINATE 50 MG: 50 TABLET, FILM COATED, EXTENDED RELEASE ORAL at 22:15

## 2021-09-20 RX ADMIN — DIPHENHYDRAMINE HYDROCHLORIDE 25 MG: 25 CAPSULE ORAL at 22:27

## 2021-09-20 RX ADMIN — SODIUM CHLORIDE 75 ML/HR: 9 INJECTION, SOLUTION INTRAVENOUS at 15:27

## 2021-09-20 RX ADMIN — REMDESIVIR 200 MG: 100 INJECTION, POWDER, LYOPHILIZED, FOR SOLUTION INTRAVENOUS at 22:47

## 2021-09-20 RX ADMIN — ALBUTEROL SULFATE 2 PUFF: 90 AEROSOL, METERED RESPIRATORY (INHALATION) at 16:29

## 2021-09-20 RX ADMIN — SODIUM CHLORIDE, PRESERVATIVE FREE 10 ML: 5 INJECTION INTRAVENOUS at 22:16

## 2021-09-20 RX ADMIN — SODIUM CHLORIDE 1000 ML: 9 INJECTION, SOLUTION INTRAVENOUS at 12:32

## 2021-09-20 RX ADMIN — ACETAMINOPHEN 650 MG: 325 TABLET ORAL at 23:59

## 2021-09-20 RX ADMIN — DEXAMETHASONE SODIUM PHOSPHATE 6 MG: 10 INJECTION INTRAMUSCULAR; INTRAVENOUS at 22:15

## 2021-09-20 RX ADMIN — ENOXAPARIN SODIUM 40 MG: 40 INJECTION SUBCUTANEOUS at 16:30

## 2021-09-20 RX ADMIN — ZINC SULFATE 220 MG (50 MG) CAPSULE 220 MG: CAPSULE at 16:29

## 2021-09-20 RX ADMIN — Medication 1000 UNITS: at 16:30

## 2021-09-20 RX ADMIN — ALBUTEROL SULFATE 2 PUFF: 90 AEROSOL, METERED RESPIRATORY (INHALATION) at 20:17

## 2021-09-20 RX ADMIN — GUAIFENESIN 1200 MG: 600 TABLET, EXTENDED RELEASE ORAL at 22:15

## 2021-09-20 RX ADMIN — OXYCODONE HYDROCHLORIDE AND ACETAMINOPHEN 500 MG: 500 TABLET ORAL at 16:31

## 2021-09-20 RX ADMIN — ATORVASTATIN CALCIUM 10 MG: 10 TABLET, FILM COATED ORAL at 22:16

## 2021-09-21 LAB
ALBUMIN SERPL-MCNC: 3.3 G/DL (ref 3.5–5.2)
ALBUMIN/GLOB SERPL: 1.1 G/DL
ALP SERPL-CCNC: 65 U/L (ref 39–117)
ALT SERPL W P-5'-P-CCNC: 23 U/L (ref 1–33)
ANION GAP SERPL CALCULATED.3IONS-SCNC: 9 MMOL/L (ref 5–15)
AST SERPL-CCNC: 39 U/L (ref 1–32)
BILIRUB SERPL-MCNC: 0.3 MG/DL (ref 0–1.2)
BUN SERPL-MCNC: 8 MG/DL (ref 8–23)
BUN/CREAT SERPL: 9.9 (ref 7–25)
CALCIUM SPEC-SCNC: 8.1 MG/DL (ref 8.6–10.5)
CHLORIDE SERPL-SCNC: 110 MMOL/L (ref 98–107)
CO2 SERPL-SCNC: 22 MMOL/L (ref 22–29)
CREAT SERPL-MCNC: 0.81 MG/DL (ref 0.57–1)
CRP SERPL-MCNC: 4.94 MG/DL (ref 0–0.5)
DEPRECATED RDW RBC AUTO: 44 FL (ref 37–54)
ERYTHROCYTE [DISTWIDTH] IN BLOOD BY AUTOMATED COUNT: 13.6 % (ref 12.3–15.4)
FERRITIN SERPL-MCNC: 556.5 NG/ML (ref 13–150)
GFR SERPL CREATININE-BSD FRML MDRD: 69 ML/MIN/1.73
GLOBULIN UR ELPH-MCNC: 2.9 GM/DL
GLUCOSE BLDC GLUCOMTR-MCNC: 202 MG/DL (ref 70–130)
GLUCOSE BLDC GLUCOMTR-MCNC: 223 MG/DL (ref 70–130)
GLUCOSE BLDC GLUCOMTR-MCNC: 229 MG/DL (ref 70–130)
GLUCOSE BLDC GLUCOMTR-MCNC: 310 MG/DL (ref 70–130)
GLUCOSE SERPL-MCNC: 232 MG/DL (ref 65–99)
HCT VFR BLD AUTO: 36.1 % (ref 34–46.6)
HGB BLD-MCNC: 11.6 G/DL (ref 12–15.9)
MCH RBC QN AUTO: 28.1 PG (ref 26.6–33)
MCHC RBC AUTO-ENTMCNC: 32.1 G/DL (ref 31.5–35.7)
MCV RBC AUTO: 87.4 FL (ref 79–97)
PLATELET # BLD AUTO: 145 10*3/MM3 (ref 140–450)
PMV BLD AUTO: 11.2 FL (ref 6–12)
POTASSIUM SERPL-SCNC: 3.8 MMOL/L (ref 3.5–5.2)
PROT SERPL-MCNC: 6.2 G/DL (ref 6–8.5)
RBC # BLD AUTO: 4.13 10*6/MM3 (ref 3.77–5.28)
SODIUM SERPL-SCNC: 141 MMOL/L (ref 136–145)
WBC # BLD AUTO: 4.06 10*3/MM3 (ref 3.4–10.8)

## 2021-09-21 PROCEDURE — 36415 COLL VENOUS BLD VENIPUNCTURE: CPT | Performed by: INTERNAL MEDICINE

## 2021-09-21 PROCEDURE — 82728 ASSAY OF FERRITIN: CPT | Performed by: INTERNAL MEDICINE

## 2021-09-21 PROCEDURE — 63710000001 DEXAMETHASONE PER 0.25 MG: Performed by: INTERNAL MEDICINE

## 2021-09-21 PROCEDURE — 63710000001 DIPHENHYDRAMINE PER 50 MG: Performed by: INTERNAL MEDICINE

## 2021-09-21 PROCEDURE — 94799 UNLISTED PULMONARY SVC/PX: CPT

## 2021-09-21 PROCEDURE — 82962 GLUCOSE BLOOD TEST: CPT

## 2021-09-21 PROCEDURE — 25010000002 ENOXAPARIN PER 10 MG: Performed by: INTERNAL MEDICINE

## 2021-09-21 PROCEDURE — 80053 COMPREHEN METABOLIC PANEL: CPT | Performed by: INTERNAL MEDICINE

## 2021-09-21 PROCEDURE — 86140 C-REACTIVE PROTEIN: CPT | Performed by: INTERNAL MEDICINE

## 2021-09-21 PROCEDURE — 63710000001 INSULIN LISPRO (HUMAN) PER 5 UNITS: Performed by: INTERNAL MEDICINE

## 2021-09-21 PROCEDURE — 85027 COMPLETE CBC AUTOMATED: CPT | Performed by: INTERNAL MEDICINE

## 2021-09-21 RX ADMIN — ALBUTEROL SULFATE 2 PUFF: 90 AEROSOL, METERED RESPIRATORY (INHALATION) at 06:39

## 2021-09-21 RX ADMIN — ASPIRIN 81 MG: 81 TABLET, COATED ORAL at 08:27

## 2021-09-21 RX ADMIN — INSULIN LISPRO 4 UNITS: 100 INJECTION, SOLUTION INTRAVENOUS; SUBCUTANEOUS at 12:02

## 2021-09-21 RX ADMIN — Medication 1000 UNITS: at 08:27

## 2021-09-21 RX ADMIN — DEXAMETHASONE 6 MG: 4 TABLET ORAL at 08:29

## 2021-09-21 RX ADMIN — SODIUM CHLORIDE, PRESERVATIVE FREE 10 ML: 5 INJECTION INTRAVENOUS at 21:59

## 2021-09-21 RX ADMIN — INSULIN LISPRO 7 UNITS: 100 INJECTION, SOLUTION INTRAVENOUS; SUBCUTANEOUS at 23:06

## 2021-09-21 RX ADMIN — CETIRIZINE HYDROCHLORIDE 5 MG: 10 TABLET ORAL at 08:27

## 2021-09-21 RX ADMIN — GUAIFENESIN 1200 MG: 600 TABLET, EXTENDED RELEASE ORAL at 21:58

## 2021-09-21 RX ADMIN — METOPROLOL SUCCINATE 50 MG: 50 TABLET, FILM COATED, EXTENDED RELEASE ORAL at 21:58

## 2021-09-21 RX ADMIN — DIPHENHYDRAMINE HYDROCHLORIDE 25 MG: 25 CAPSULE ORAL at 21:59

## 2021-09-21 RX ADMIN — SODIUM CHLORIDE, PRESERVATIVE FREE 10 ML: 5 INJECTION INTRAVENOUS at 08:27

## 2021-09-21 RX ADMIN — OXYCODONE HYDROCHLORIDE AND ACETAMINOPHEN 500 MG: 500 TABLET ORAL at 08:27

## 2021-09-21 RX ADMIN — ATORVASTATIN CALCIUM 10 MG: 10 TABLET, FILM COATED ORAL at 21:59

## 2021-09-21 RX ADMIN — PANTOPRAZOLE SODIUM 40 MG: 40 TABLET, DELAYED RELEASE ORAL at 08:27

## 2021-09-21 RX ADMIN — REMDESIVIR 100 MG: 100 INJECTION, POWDER, LYOPHILIZED, FOR SOLUTION INTRAVENOUS at 22:01

## 2021-09-21 RX ADMIN — INSULIN LISPRO 4 UNITS: 100 INJECTION, SOLUTION INTRAVENOUS; SUBCUTANEOUS at 08:40

## 2021-09-21 RX ADMIN — GUAIFENESIN 1200 MG: 600 TABLET, EXTENDED RELEASE ORAL at 08:27

## 2021-09-21 RX ADMIN — ZINC SULFATE 220 MG (50 MG) CAPSULE 220 MG: CAPSULE at 08:27

## 2021-09-21 RX ADMIN — ALBUTEROL SULFATE 2 PUFF: 90 AEROSOL, METERED RESPIRATORY (INHALATION) at 10:18

## 2021-09-21 RX ADMIN — ALBUTEROL SULFATE 2 PUFF: 90 AEROSOL, METERED RESPIRATORY (INHALATION) at 14:40

## 2021-09-21 RX ADMIN — ENOXAPARIN SODIUM 40 MG: 40 INJECTION SUBCUTANEOUS at 08:28

## 2021-09-21 RX ADMIN — ALBUTEROL SULFATE 2 PUFF: 90 AEROSOL, METERED RESPIRATORY (INHALATION) at 19:42

## 2021-09-21 RX ADMIN — INSULIN LISPRO 4 UNITS: 100 INJECTION, SOLUTION INTRAVENOUS; SUBCUTANEOUS at 16:57

## 2021-09-22 LAB
ALBUMIN SERPL-MCNC: 3.3 G/DL (ref 3.5–5.2)
ALBUMIN/GLOB SERPL: 1.1 G/DL
ALP SERPL-CCNC: 63 U/L (ref 39–117)
ALT SERPL W P-5'-P-CCNC: 33 U/L (ref 1–33)
ANION GAP SERPL CALCULATED.3IONS-SCNC: 9 MMOL/L (ref 5–15)
AST SERPL-CCNC: 47 U/L (ref 1–32)
BILIRUB SERPL-MCNC: 0.2 MG/DL (ref 0–1.2)
BUN SERPL-MCNC: 16 MG/DL (ref 8–23)
BUN/CREAT SERPL: 22.5 (ref 7–25)
CALCIUM SPEC-SCNC: 8.8 MG/DL (ref 8.6–10.5)
CHLORIDE SERPL-SCNC: 110 MMOL/L (ref 98–107)
CO2 SERPL-SCNC: 23 MMOL/L (ref 22–29)
CREAT SERPL-MCNC: 0.71 MG/DL (ref 0.57–1)
GFR SERPL CREATININE-BSD FRML MDRD: 80 ML/MIN/1.73
GLOBULIN UR ELPH-MCNC: 2.9 GM/DL
GLUCOSE BLDC GLUCOMTR-MCNC: 199 MG/DL (ref 70–130)
GLUCOSE BLDC GLUCOMTR-MCNC: 203 MG/DL (ref 70–130)
GLUCOSE BLDC GLUCOMTR-MCNC: 243 MG/DL (ref 70–130)
GLUCOSE BLDC GLUCOMTR-MCNC: 307 MG/DL (ref 70–130)
GLUCOSE SERPL-MCNC: 210 MG/DL (ref 65–99)
POTASSIUM SERPL-SCNC: 3.7 MMOL/L (ref 3.5–5.2)
PROT SERPL-MCNC: 6.2 G/DL (ref 6–8.5)
SODIUM SERPL-SCNC: 142 MMOL/L (ref 136–145)

## 2021-09-22 PROCEDURE — 63710000001 DIPHENHYDRAMINE PER 50 MG: Performed by: INTERNAL MEDICINE

## 2021-09-22 PROCEDURE — 82962 GLUCOSE BLOOD TEST: CPT

## 2021-09-22 PROCEDURE — 63710000001 INSULIN LISPRO (HUMAN) PER 5 UNITS: Performed by: INTERNAL MEDICINE

## 2021-09-22 PROCEDURE — 63710000001 DEXAMETHASONE PER 0.25 MG: Performed by: INTERNAL MEDICINE

## 2021-09-22 PROCEDURE — 94799 UNLISTED PULMONARY SVC/PX: CPT

## 2021-09-22 PROCEDURE — 80053 COMPREHEN METABOLIC PANEL: CPT | Performed by: INTERNAL MEDICINE

## 2021-09-22 PROCEDURE — 25010000002 ENOXAPARIN PER 10 MG: Performed by: INTERNAL MEDICINE

## 2021-09-22 RX ADMIN — ALBUTEROL SULFATE 2 PUFF: 90 AEROSOL, METERED RESPIRATORY (INHALATION) at 09:32

## 2021-09-22 RX ADMIN — DEXAMETHASONE 6 MG: 4 TABLET ORAL at 08:40

## 2021-09-22 RX ADMIN — INSULIN LISPRO 7 UNITS: 100 INJECTION, SOLUTION INTRAVENOUS; SUBCUTANEOUS at 22:14

## 2021-09-22 RX ADMIN — METOPROLOL SUCCINATE 50 MG: 50 TABLET, FILM COATED, EXTENDED RELEASE ORAL at 22:13

## 2021-09-22 RX ADMIN — CETIRIZINE HYDROCHLORIDE 5 MG: 10 TABLET ORAL at 08:40

## 2021-09-22 RX ADMIN — GUAIFENESIN 1200 MG: 600 TABLET, EXTENDED RELEASE ORAL at 22:13

## 2021-09-22 RX ADMIN — OXYCODONE HYDROCHLORIDE AND ACETAMINOPHEN 500 MG: 500 TABLET ORAL at 08:40

## 2021-09-22 RX ADMIN — Medication 1000 UNITS: at 08:40

## 2021-09-22 RX ADMIN — GUAIFENESIN 1200 MG: 600 TABLET, EXTENDED RELEASE ORAL at 08:40

## 2021-09-22 RX ADMIN — INSULIN LISPRO 4 UNITS: 100 INJECTION, SOLUTION INTRAVENOUS; SUBCUTANEOUS at 12:11

## 2021-09-22 RX ADMIN — INSULIN LISPRO 2 UNITS: 100 INJECTION, SOLUTION INTRAVENOUS; SUBCUTANEOUS at 08:39

## 2021-09-22 RX ADMIN — ALBUTEROL SULFATE 2 PUFF: 90 AEROSOL, METERED RESPIRATORY (INHALATION) at 05:50

## 2021-09-22 RX ADMIN — PANTOPRAZOLE SODIUM 40 MG: 40 TABLET, DELAYED RELEASE ORAL at 08:40

## 2021-09-22 RX ADMIN — ASPIRIN 81 MG: 81 TABLET, COATED ORAL at 08:40

## 2021-09-22 RX ADMIN — ENOXAPARIN SODIUM 40 MG: 40 INJECTION SUBCUTANEOUS at 08:39

## 2021-09-22 RX ADMIN — INSULIN LISPRO 4 UNITS: 100 INJECTION, SOLUTION INTRAVENOUS; SUBCUTANEOUS at 17:54

## 2021-09-22 RX ADMIN — ALBUTEROL SULFATE 2 PUFF: 90 AEROSOL, METERED RESPIRATORY (INHALATION) at 13:40

## 2021-09-22 RX ADMIN — REMDESIVIR 100 MG: 100 INJECTION, POWDER, LYOPHILIZED, FOR SOLUTION INTRAVENOUS at 22:13

## 2021-09-22 RX ADMIN — ATORVASTATIN CALCIUM 10 MG: 10 TABLET, FILM COATED ORAL at 22:13

## 2021-09-22 RX ADMIN — ALBUTEROL SULFATE 2 PUFF: 90 AEROSOL, METERED RESPIRATORY (INHALATION) at 21:01

## 2021-09-22 RX ADMIN — DIPHENHYDRAMINE HYDROCHLORIDE 25 MG: 25 CAPSULE ORAL at 22:13

## 2021-09-22 RX ADMIN — ZINC SULFATE 220 MG (50 MG) CAPSULE 220 MG: CAPSULE at 08:40

## 2021-09-22 RX ADMIN — SODIUM CHLORIDE, PRESERVATIVE FREE 10 ML: 5 INJECTION INTRAVENOUS at 22:15

## 2021-09-23 ENCOUNTER — APPOINTMENT (OUTPATIENT)
Dept: CT IMAGING | Facility: HOSPITAL | Age: 76
End: 2021-09-23

## 2021-09-23 PROBLEM — R74.01 ELEVATED TRANSAMINASE LEVEL: Status: ACTIVE | Noted: 2021-09-23

## 2021-09-23 PROBLEM — U07.1 ACUTE HYPOXEMIC RESPIRATORY FAILURE DUE TO COVID-19: Status: ACTIVE | Noted: 2021-09-20

## 2021-09-23 PROBLEM — E27.9 ADRENAL NODULE: Status: ACTIVE | Noted: 2018-07-06

## 2021-09-23 PROBLEM — J96.01 ACUTE HYPOXEMIC RESPIRATORY FAILURE DUE TO COVID-19: Status: ACTIVE | Noted: 2021-09-20

## 2021-09-23 LAB
ALBUMIN SERPL-MCNC: 3.5 G/DL (ref 3.5–5.2)
ALBUMIN/GLOB SERPL: 1.3 G/DL
ALP SERPL-CCNC: 65 U/L (ref 39–117)
ALT SERPL W P-5'-P-CCNC: 37 U/L (ref 1–33)
ANION GAP SERPL CALCULATED.3IONS-SCNC: 10 MMOL/L (ref 5–15)
AST SERPL-CCNC: 42 U/L (ref 1–32)
BILIRUB SERPL-MCNC: 0.3 MG/DL (ref 0–1.2)
BUN SERPL-MCNC: 18 MG/DL (ref 8–23)
BUN/CREAT SERPL: 27.3 (ref 7–25)
CALCIUM SPEC-SCNC: 8.9 MG/DL (ref 8.6–10.5)
CHLORIDE SERPL-SCNC: 111 MMOL/L (ref 98–107)
CO2 SERPL-SCNC: 22 MMOL/L (ref 22–29)
CREAT SERPL-MCNC: 0.66 MG/DL (ref 0.57–1)
CRP SERPL-MCNC: 2.21 MG/DL (ref 0–0.5)
FERRITIN SERPL-MCNC: 470.5 NG/ML (ref 13–150)
GFR SERPL CREATININE-BSD FRML MDRD: 88 ML/MIN/1.73
GLOBULIN UR ELPH-MCNC: 2.8 GM/DL
GLUCOSE BLDC GLUCOMTR-MCNC: 163 MG/DL (ref 70–130)
GLUCOSE BLDC GLUCOMTR-MCNC: 170 MG/DL (ref 70–130)
GLUCOSE BLDC GLUCOMTR-MCNC: 276 MG/DL (ref 70–130)
GLUCOSE BLDC GLUCOMTR-MCNC: 302 MG/DL (ref 70–130)
GLUCOSE SERPL-MCNC: 220 MG/DL (ref 65–99)
POTASSIUM SERPL-SCNC: 4 MMOL/L (ref 3.5–5.2)
PROT SERPL-MCNC: 6.3 G/DL (ref 6–8.5)
SODIUM SERPL-SCNC: 143 MMOL/L (ref 136–145)

## 2021-09-23 PROCEDURE — 25010000002 ENOXAPARIN PER 10 MG: Performed by: INTERNAL MEDICINE

## 2021-09-23 PROCEDURE — 94799 UNLISTED PULMONARY SVC/PX: CPT

## 2021-09-23 PROCEDURE — 86140 C-REACTIVE PROTEIN: CPT | Performed by: INTERNAL MEDICINE

## 2021-09-23 PROCEDURE — 63710000001 DEXAMETHASONE PER 0.25 MG: Performed by: INTERNAL MEDICINE

## 2021-09-23 PROCEDURE — 0 IOPAMIDOL PER 1 ML: Performed by: INTERNAL MEDICINE

## 2021-09-23 PROCEDURE — 63710000001 INSULIN LISPRO (HUMAN) PER 5 UNITS: Performed by: INTERNAL MEDICINE

## 2021-09-23 PROCEDURE — 80053 COMPREHEN METABOLIC PANEL: CPT | Performed by: INTERNAL MEDICINE

## 2021-09-23 PROCEDURE — 25010000002 TOCILIZUMAB 162 MG/0.9ML SOLUTION 0.9 ML SYRINGE: Performed by: INTERNAL MEDICINE

## 2021-09-23 PROCEDURE — 99222 1ST HOSP IP/OBS MODERATE 55: CPT | Performed by: INTERNAL MEDICINE

## 2021-09-23 PROCEDURE — 82728 ASSAY OF FERRITIN: CPT | Performed by: INTERNAL MEDICINE

## 2021-09-23 PROCEDURE — 63710000001 INSULIN DETEMIR PER 5 UNITS: Performed by: INTERNAL MEDICINE

## 2021-09-23 PROCEDURE — 82962 GLUCOSE BLOOD TEST: CPT

## 2021-09-23 PROCEDURE — 71275 CT ANGIOGRAPHY CHEST: CPT

## 2021-09-23 PROCEDURE — 63710000001 DIPHENHYDRAMINE PER 50 MG: Performed by: INTERNAL MEDICINE

## 2021-09-23 RX ORDER — GLIPIZIDE 5 MG/1
2.5 TABLET ORAL
Refills: 2 | Status: DISCONTINUED | OUTPATIENT
Start: 2021-09-23 | End: 2021-09-30 | Stop reason: HOSPADM

## 2021-09-23 RX ORDER — METFORMIN HYDROCHLORIDE 500 MG/1
500 TABLET, EXTENDED RELEASE ORAL DAILY
Status: DISCONTINUED | OUTPATIENT
Start: 2021-09-26 | End: 2021-09-30 | Stop reason: HOSPADM

## 2021-09-23 RX ORDER — METFORMIN HYDROCHLORIDE 500 MG/1
500 TABLET, EXTENDED RELEASE ORAL DAILY
Status: DISCONTINUED | OUTPATIENT
Start: 2021-09-23 | End: 2021-09-23

## 2021-09-23 RX ADMIN — GLIPIZIDE 2.5 MG: 5 TABLET ORAL at 17:58

## 2021-09-23 RX ADMIN — ALBUTEROL SULFATE 2 PUFF: 90 AEROSOL, METERED RESPIRATORY (INHALATION) at 06:16

## 2021-09-23 RX ADMIN — ATORVASTATIN CALCIUM 10 MG: 10 TABLET, FILM COATED ORAL at 20:45

## 2021-09-23 RX ADMIN — INSULIN LISPRO 2 UNITS: 100 INJECTION, SOLUTION INTRAVENOUS; SUBCUTANEOUS at 12:50

## 2021-09-23 RX ADMIN — GUAIFENESIN 1200 MG: 600 TABLET, EXTENDED RELEASE ORAL at 08:43

## 2021-09-23 RX ADMIN — REMDESIVIR 100 MG: 100 INJECTION, POWDER, LYOPHILIZED, FOR SOLUTION INTRAVENOUS at 20:45

## 2021-09-23 RX ADMIN — INSULIN DETEMIR 20 UNITS: 100 INJECTION, SOLUTION SUBCUTANEOUS at 20:46

## 2021-09-23 RX ADMIN — GUAIFENESIN 1200 MG: 600 TABLET, EXTENDED RELEASE ORAL at 20:45

## 2021-09-23 RX ADMIN — ALBUTEROL SULFATE 2 PUFF: 90 AEROSOL, METERED RESPIRATORY (INHALATION) at 09:33

## 2021-09-23 RX ADMIN — DIPHENHYDRAMINE HYDROCHLORIDE 25 MG: 25 CAPSULE ORAL at 20:45

## 2021-09-23 RX ADMIN — TOCILIZUMAB 648 MG: 180 INJECTION, SOLUTION SUBCUTANEOUS at 23:06

## 2021-09-23 RX ADMIN — OXYCODONE HYDROCHLORIDE AND ACETAMINOPHEN 500 MG: 500 TABLET ORAL at 08:43

## 2021-09-23 RX ADMIN — SODIUM CHLORIDE, PRESERVATIVE FREE 10 ML: 5 INJECTION INTRAVENOUS at 08:44

## 2021-09-23 RX ADMIN — INSULIN LISPRO 2 UNITS: 100 INJECTION, SOLUTION INTRAVENOUS; SUBCUTANEOUS at 08:42

## 2021-09-23 RX ADMIN — ENOXAPARIN SODIUM 40 MG: 40 INJECTION SUBCUTANEOUS at 08:44

## 2021-09-23 RX ADMIN — INSULIN LISPRO 7 UNITS: 100 INJECTION, SOLUTION INTRAVENOUS; SUBCUTANEOUS at 17:37

## 2021-09-23 RX ADMIN — DEXAMETHASONE 6 MG: 4 TABLET ORAL at 08:43

## 2021-09-23 RX ADMIN — CETIRIZINE HYDROCHLORIDE 5 MG: 10 TABLET ORAL at 08:43

## 2021-09-23 RX ADMIN — IOPAMIDOL 100 ML: 755 INJECTION, SOLUTION INTRAVENOUS at 11:03

## 2021-09-23 RX ADMIN — INSULIN LISPRO 6 UNITS: 100 INJECTION, SOLUTION INTRAVENOUS; SUBCUTANEOUS at 20:45

## 2021-09-23 RX ADMIN — ZINC SULFATE 220 MG (50 MG) CAPSULE 220 MG: CAPSULE at 08:44

## 2021-09-23 RX ADMIN — ASPIRIN 81 MG: 81 TABLET, COATED ORAL at 08:43

## 2021-09-23 RX ADMIN — ALBUTEROL SULFATE 2 PUFF: 90 AEROSOL, METERED RESPIRATORY (INHALATION) at 13:46

## 2021-09-23 RX ADMIN — PANTOPRAZOLE SODIUM 40 MG: 40 TABLET, DELAYED RELEASE ORAL at 08:43

## 2021-09-23 RX ADMIN — Medication 1000 UNITS: at 08:43

## 2021-09-23 RX ADMIN — ALBUTEROL SULFATE 2 PUFF: 90 AEROSOL, METERED RESPIRATORY (INHALATION) at 19:37

## 2021-09-23 RX ADMIN — SODIUM CHLORIDE, PRESERVATIVE FREE 10 ML: 5 INJECTION INTRAVENOUS at 20:46

## 2021-09-23 RX ADMIN — METOPROLOL SUCCINATE 50 MG: 50 TABLET, FILM COATED, EXTENDED RELEASE ORAL at 20:45

## 2021-09-24 LAB
ALBUMIN SERPL-MCNC: 3.4 G/DL (ref 3.5–5.2)
ALBUMIN/GLOB SERPL: 1.2 G/DL
ALP SERPL-CCNC: 74 U/L (ref 39–117)
ALT SERPL W P-5'-P-CCNC: 46 U/L (ref 1–33)
ANION GAP SERPL CALCULATED.3IONS-SCNC: 13 MMOL/L (ref 5–15)
AST SERPL-CCNC: 61 U/L (ref 1–32)
BILIRUB SERPL-MCNC: 0.4 MG/DL (ref 0–1.2)
BUN SERPL-MCNC: 19 MG/DL (ref 8–23)
BUN/CREAT SERPL: 27.1 (ref 7–25)
CALCIUM SPEC-SCNC: 8.6 MG/DL (ref 8.6–10.5)
CHLORIDE SERPL-SCNC: 111 MMOL/L (ref 98–107)
CO2 SERPL-SCNC: 22 MMOL/L (ref 22–29)
CREAT SERPL-MCNC: 0.7 MG/DL (ref 0.57–1)
DEPRECATED RDW RBC AUTO: 41.3 FL (ref 37–54)
ERYTHROCYTE [DISTWIDTH] IN BLOOD BY AUTOMATED COUNT: 13.3 % (ref 12.3–15.4)
GFR SERPL CREATININE-BSD FRML MDRD: 82 ML/MIN/1.73
GLOBULIN UR ELPH-MCNC: 2.8 GM/DL
GLUCOSE BLDC GLUCOMTR-MCNC: 134 MG/DL (ref 70–130)
GLUCOSE BLDC GLUCOMTR-MCNC: 140 MG/DL (ref 70–130)
GLUCOSE BLDC GLUCOMTR-MCNC: 274 MG/DL (ref 70–130)
GLUCOSE BLDC GLUCOMTR-MCNC: 374 MG/DL (ref 70–130)
GLUCOSE SERPL-MCNC: 169 MG/DL (ref 65–99)
HCT VFR BLD AUTO: 35.6 % (ref 34–46.6)
HGB BLD-MCNC: 11.3 G/DL (ref 12–15.9)
MCH RBC QN AUTO: 27 PG (ref 26.6–33)
MCHC RBC AUTO-ENTMCNC: 31.7 G/DL (ref 31.5–35.7)
MCV RBC AUTO: 85.2 FL (ref 79–97)
PLATELET # BLD AUTO: 247 10*3/MM3 (ref 140–450)
PMV BLD AUTO: 11.5 FL (ref 6–12)
POTASSIUM SERPL-SCNC: 4.2 MMOL/L (ref 3.5–5.2)
PROT SERPL-MCNC: 6.2 G/DL (ref 6–8.5)
RBC # BLD AUTO: 4.18 10*6/MM3 (ref 3.77–5.28)
SODIUM SERPL-SCNC: 146 MMOL/L (ref 136–145)
WBC # BLD AUTO: 7.24 10*3/MM3 (ref 3.4–10.8)

## 2021-09-24 PROCEDURE — 25010000002 ENOXAPARIN PER 10 MG: Performed by: INTERNAL MEDICINE

## 2021-09-24 PROCEDURE — 94799 UNLISTED PULMONARY SVC/PX: CPT

## 2021-09-24 PROCEDURE — 63710000001 DIPHENHYDRAMINE PER 50 MG: Performed by: INTERNAL MEDICINE

## 2021-09-24 PROCEDURE — 99232 SBSQ HOSP IP/OBS MODERATE 35: CPT | Performed by: INTERNAL MEDICINE

## 2021-09-24 PROCEDURE — 63710000001 INSULIN DETEMIR PER 5 UNITS: Performed by: INTERNAL MEDICINE

## 2021-09-24 PROCEDURE — 80053 COMPREHEN METABOLIC PANEL: CPT | Performed by: INTERNAL MEDICINE

## 2021-09-24 PROCEDURE — 85027 COMPLETE CBC AUTOMATED: CPT | Performed by: INTERNAL MEDICINE

## 2021-09-24 PROCEDURE — 63710000001 DEXAMETHASONE PER 0.25 MG: Performed by: INTERNAL MEDICINE

## 2021-09-24 PROCEDURE — 82962 GLUCOSE BLOOD TEST: CPT

## 2021-09-24 PROCEDURE — XW033H5 INTRODUCTION OF TOCILIZUMAB INTO PERIPHERAL VEIN, PERCUTANEOUS APPROACH, NEW TECHNOLOGY GROUP 5: ICD-10-PCS | Performed by: INTERNAL MEDICINE

## 2021-09-24 PROCEDURE — 63710000001 INSULIN LISPRO (HUMAN) PER 5 UNITS: Performed by: INTERNAL MEDICINE

## 2021-09-24 RX ADMIN — GLIPIZIDE 2.5 MG: 5 TABLET ORAL at 09:01

## 2021-09-24 RX ADMIN — ALBUTEROL SULFATE 2 PUFF: 90 AEROSOL, METERED RESPIRATORY (INHALATION) at 07:00

## 2021-09-24 RX ADMIN — ALBUTEROL SULFATE 2 PUFF: 90 AEROSOL, METERED RESPIRATORY (INHALATION) at 10:45

## 2021-09-24 RX ADMIN — GLIPIZIDE 2.5 MG: 5 TABLET ORAL at 17:05

## 2021-09-24 RX ADMIN — SODIUM CHLORIDE, PRESERVATIVE FREE 10 ML: 5 INJECTION INTRAVENOUS at 21:34

## 2021-09-24 RX ADMIN — REMDESIVIR 100 MG: 100 INJECTION, POWDER, LYOPHILIZED, FOR SOLUTION INTRAVENOUS at 21:32

## 2021-09-24 RX ADMIN — GUAIFENESIN 1200 MG: 600 TABLET, EXTENDED RELEASE ORAL at 21:33

## 2021-09-24 RX ADMIN — PANTOPRAZOLE SODIUM 40 MG: 40 TABLET, DELAYED RELEASE ORAL at 09:02

## 2021-09-24 RX ADMIN — INSULIN LISPRO 8 UNITS: 100 INJECTION, SOLUTION INTRAVENOUS; SUBCUTANEOUS at 21:34

## 2021-09-24 RX ADMIN — OXYCODONE HYDROCHLORIDE AND ACETAMINOPHEN 500 MG: 500 TABLET ORAL at 09:02

## 2021-09-24 RX ADMIN — DIPHENHYDRAMINE HYDROCHLORIDE 25 MG: 25 CAPSULE ORAL at 21:34

## 2021-09-24 RX ADMIN — CETIRIZINE HYDROCHLORIDE 5 MG: 10 TABLET ORAL at 09:01

## 2021-09-24 RX ADMIN — ZINC SULFATE 220 MG (50 MG) CAPSULE 220 MG: CAPSULE at 09:02

## 2021-09-24 RX ADMIN — ALBUTEROL SULFATE 2 PUFF: 90 AEROSOL, METERED RESPIRATORY (INHALATION) at 20:04

## 2021-09-24 RX ADMIN — ATORVASTATIN CALCIUM 10 MG: 10 TABLET, FILM COATED ORAL at 21:33

## 2021-09-24 RX ADMIN — ENOXAPARIN SODIUM 40 MG: 40 INJECTION SUBCUTANEOUS at 09:02

## 2021-09-24 RX ADMIN — INSULIN LISPRO 6 UNITS: 100 INJECTION, SOLUTION INTRAVENOUS; SUBCUTANEOUS at 17:05

## 2021-09-24 RX ADMIN — ASPIRIN 81 MG: 81 TABLET, COATED ORAL at 09:02

## 2021-09-24 RX ADMIN — METOPROLOL SUCCINATE 50 MG: 50 TABLET, FILM COATED, EXTENDED RELEASE ORAL at 21:33

## 2021-09-24 RX ADMIN — SODIUM CHLORIDE, PRESERVATIVE FREE 10 ML: 5 INJECTION INTRAVENOUS at 09:04

## 2021-09-24 RX ADMIN — Medication 1000 UNITS: at 09:01

## 2021-09-24 RX ADMIN — ALBUTEROL SULFATE 2 PUFF: 90 AEROSOL, METERED RESPIRATORY (INHALATION) at 15:08

## 2021-09-24 RX ADMIN — INSULIN DETEMIR 20 UNITS: 100 INJECTION, SOLUTION SUBCUTANEOUS at 21:34

## 2021-09-24 RX ADMIN — GUAIFENESIN 1200 MG: 600 TABLET, EXTENDED RELEASE ORAL at 09:02

## 2021-09-24 RX ADMIN — DEXAMETHASONE 6 MG: 4 TABLET ORAL at 09:02

## 2021-09-25 LAB
ALBUMIN SERPL-MCNC: 3.6 G/DL (ref 3.5–5.2)
ALBUMIN/GLOB SERPL: 1.3 G/DL
ALP SERPL-CCNC: 76 U/L (ref 39–117)
ALT SERPL W P-5'-P-CCNC: 46 U/L (ref 1–33)
ANION GAP SERPL CALCULATED.3IONS-SCNC: 13 MMOL/L (ref 5–15)
AST SERPL-CCNC: 34 U/L (ref 1–32)
BACTERIA SPEC AEROBE CULT: NORMAL
BACTERIA SPEC AEROBE CULT: NORMAL
BILIRUB SERPL-MCNC: 0.4 MG/DL (ref 0–1.2)
BUN SERPL-MCNC: 20 MG/DL (ref 8–23)
BUN/CREAT SERPL: 28.2 (ref 7–25)
CALCIUM SPEC-SCNC: 8.7 MG/DL (ref 8.6–10.5)
CHLORIDE SERPL-SCNC: 107 MMOL/L (ref 98–107)
CO2 SERPL-SCNC: 20 MMOL/L (ref 22–29)
CREAT SERPL-MCNC: 0.71 MG/DL (ref 0.57–1)
CRP SERPL-MCNC: 1.34 MG/DL (ref 0–0.5)
FERRITIN SERPL-MCNC: 404.6 NG/ML (ref 13–150)
GFR SERPL CREATININE-BSD FRML MDRD: 80 ML/MIN/1.73
GLOBULIN UR ELPH-MCNC: 2.8 GM/DL
GLUCOSE BLDC GLUCOMTR-MCNC: 101 MG/DL (ref 70–130)
GLUCOSE BLDC GLUCOMTR-MCNC: 138 MG/DL (ref 70–130)
GLUCOSE BLDC GLUCOMTR-MCNC: 238 MG/DL (ref 70–130)
GLUCOSE BLDC GLUCOMTR-MCNC: 320 MG/DL (ref 70–130)
GLUCOSE SERPL-MCNC: 269 MG/DL (ref 65–99)
POTASSIUM SERPL-SCNC: 3.5 MMOL/L (ref 3.5–5.2)
PROT SERPL-MCNC: 6.4 G/DL (ref 6–8.5)
SODIUM SERPL-SCNC: 140 MMOL/L (ref 136–145)

## 2021-09-25 PROCEDURE — 94799 UNLISTED PULMONARY SVC/PX: CPT

## 2021-09-25 PROCEDURE — 63710000001 DEXAMETHASONE PER 0.25 MG: Performed by: INTERNAL MEDICINE

## 2021-09-25 PROCEDURE — 86140 C-REACTIVE PROTEIN: CPT | Performed by: INTERNAL MEDICINE

## 2021-09-25 PROCEDURE — 63710000001 DIPHENHYDRAMINE PER 50 MG: Performed by: INTERNAL MEDICINE

## 2021-09-25 PROCEDURE — 82962 GLUCOSE BLOOD TEST: CPT

## 2021-09-25 PROCEDURE — 80053 COMPREHEN METABOLIC PANEL: CPT | Performed by: INTERNAL MEDICINE

## 2021-09-25 PROCEDURE — 63710000001 INSULIN DETEMIR PER 5 UNITS: Performed by: INTERNAL MEDICINE

## 2021-09-25 PROCEDURE — 63710000001 INSULIN LISPRO (HUMAN) PER 5 UNITS: Performed by: INTERNAL MEDICINE

## 2021-09-25 PROCEDURE — 25010000002 ENOXAPARIN PER 10 MG: Performed by: INTERNAL MEDICINE

## 2021-09-25 PROCEDURE — 82728 ASSAY OF FERRITIN: CPT | Performed by: INTERNAL MEDICINE

## 2021-09-25 RX ORDER — LOSARTAN POTASSIUM 50 MG/1
50 TABLET ORAL DAILY
Status: DISCONTINUED | OUTPATIENT
Start: 2021-09-25 | End: 2021-09-30 | Stop reason: HOSPADM

## 2021-09-25 RX ADMIN — CETIRIZINE HYDROCHLORIDE 5 MG: 10 TABLET ORAL at 09:56

## 2021-09-25 RX ADMIN — GLIPIZIDE 2.5 MG: 5 TABLET ORAL at 09:57

## 2021-09-25 RX ADMIN — PANTOPRAZOLE SODIUM 40 MG: 40 TABLET, DELAYED RELEASE ORAL at 10:51

## 2021-09-25 RX ADMIN — SODIUM CHLORIDE, PRESERVATIVE FREE 10 ML: 5 INJECTION INTRAVENOUS at 09:57

## 2021-09-25 RX ADMIN — ASPIRIN 81 MG: 81 TABLET, COATED ORAL at 09:56

## 2021-09-25 RX ADMIN — ALBUTEROL SULFATE 2 PUFF: 90 AEROSOL, METERED RESPIRATORY (INHALATION) at 10:33

## 2021-09-25 RX ADMIN — ZINC SULFATE 220 MG (50 MG) CAPSULE 220 MG: CAPSULE at 09:56

## 2021-09-25 RX ADMIN — GLIPIZIDE 2.5 MG: 5 TABLET ORAL at 17:26

## 2021-09-25 RX ADMIN — DEXAMETHASONE 6 MG: 4 TABLET ORAL at 09:56

## 2021-09-25 RX ADMIN — INSULIN DETEMIR 30 UNITS: 100 INJECTION, SOLUTION SUBCUTANEOUS at 22:44

## 2021-09-25 RX ADMIN — ALBUTEROL SULFATE 2 PUFF: 90 AEROSOL, METERED RESPIRATORY (INHALATION) at 14:33

## 2021-09-25 RX ADMIN — ATORVASTATIN CALCIUM 10 MG: 10 TABLET, FILM COATED ORAL at 21:39

## 2021-09-25 RX ADMIN — GUAIFENESIN 1200 MG: 600 TABLET, EXTENDED RELEASE ORAL at 09:56

## 2021-09-25 RX ADMIN — ALBUTEROL SULFATE 2 PUFF: 90 AEROSOL, METERED RESPIRATORY (INHALATION) at 07:40

## 2021-09-25 RX ADMIN — LOSARTAN POTASSIUM 50 MG: 50 TABLET, FILM COATED ORAL at 10:51

## 2021-09-25 RX ADMIN — METOPROLOL SUCCINATE 50 MG: 50 TABLET, FILM COATED, EXTENDED RELEASE ORAL at 21:39

## 2021-09-25 RX ADMIN — ENOXAPARIN SODIUM 40 MG: 40 INJECTION SUBCUTANEOUS at 10:50

## 2021-09-25 RX ADMIN — INSULIN LISPRO 6 UNITS: 100 INJECTION, SOLUTION INTRAVENOUS; SUBCUTANEOUS at 17:26

## 2021-09-25 RX ADMIN — ALBUTEROL SULFATE 2 PUFF: 90 AEROSOL, METERED RESPIRATORY (INHALATION) at 20:31

## 2021-09-25 RX ADMIN — INSULIN LISPRO 7 UNITS: 100 INJECTION, SOLUTION INTRAVENOUS; SUBCUTANEOUS at 21:39

## 2021-09-25 RX ADMIN — DIPHENHYDRAMINE HYDROCHLORIDE 25 MG: 25 CAPSULE ORAL at 21:39

## 2021-09-25 RX ADMIN — OXYCODONE HYDROCHLORIDE AND ACETAMINOPHEN 500 MG: 500 TABLET ORAL at 09:56

## 2021-09-25 RX ADMIN — GUAIFENESIN 1200 MG: 600 TABLET, EXTENDED RELEASE ORAL at 21:39

## 2021-09-25 RX ADMIN — SODIUM CHLORIDE, PRESERVATIVE FREE 10 ML: 5 INJECTION INTRAVENOUS at 21:51

## 2021-09-25 RX ADMIN — Medication 1000 UNITS: at 09:56

## 2021-09-26 LAB
ALBUMIN SERPL-MCNC: 3.5 G/DL (ref 3.5–5.2)
ALBUMIN/GLOB SERPL: 1.3 G/DL
ALP SERPL-CCNC: 76 U/L (ref 39–117)
ALT SERPL W P-5'-P-CCNC: 32 U/L (ref 1–33)
ANION GAP SERPL CALCULATED.3IONS-SCNC: 12 MMOL/L (ref 5–15)
AST SERPL-CCNC: 28 U/L (ref 1–32)
BILIRUB SERPL-MCNC: 0.5 MG/DL (ref 0–1.2)
BUN SERPL-MCNC: 17 MG/DL (ref 8–23)
BUN/CREAT SERPL: 24.3 (ref 7–25)
CALCIUM SPEC-SCNC: 8.4 MG/DL (ref 8.6–10.5)
CHLORIDE SERPL-SCNC: 107 MMOL/L (ref 98–107)
CO2 SERPL-SCNC: 22 MMOL/L (ref 22–29)
CREAT SERPL-MCNC: 0.7 MG/DL (ref 0.57–1)
GFR SERPL CREATININE-BSD FRML MDRD: 82 ML/MIN/1.73
GLOBULIN UR ELPH-MCNC: 2.7 GM/DL
GLUCOSE BLDC GLUCOMTR-MCNC: 120 MG/DL (ref 70–130)
GLUCOSE BLDC GLUCOMTR-MCNC: 134 MG/DL (ref 70–130)
GLUCOSE BLDC GLUCOMTR-MCNC: 321 MG/DL (ref 70–130)
GLUCOSE BLDC GLUCOMTR-MCNC: 351 MG/DL (ref 70–130)
GLUCOSE SERPL-MCNC: 197 MG/DL (ref 65–99)
POTASSIUM SERPL-SCNC: 3.8 MMOL/L (ref 3.5–5.2)
PROT SERPL-MCNC: 6.2 G/DL (ref 6–8.5)
SODIUM SERPL-SCNC: 141 MMOL/L (ref 136–145)

## 2021-09-26 PROCEDURE — 94799 UNLISTED PULMONARY SVC/PX: CPT

## 2021-09-26 PROCEDURE — 63710000001 DIPHENHYDRAMINE PER 50 MG: Performed by: INTERNAL MEDICINE

## 2021-09-26 PROCEDURE — 63710000001 INSULIN DETEMIR PER 5 UNITS: Performed by: INTERNAL MEDICINE

## 2021-09-26 PROCEDURE — 82962 GLUCOSE BLOOD TEST: CPT

## 2021-09-26 PROCEDURE — 25010000002 ENOXAPARIN PER 10 MG: Performed by: INTERNAL MEDICINE

## 2021-09-26 PROCEDURE — 63710000001 DEXAMETHASONE PER 0.25 MG: Performed by: INTERNAL MEDICINE

## 2021-09-26 PROCEDURE — 63710000001 INSULIN LISPRO (HUMAN) PER 5 UNITS: Performed by: INTERNAL MEDICINE

## 2021-09-26 PROCEDURE — 80053 COMPREHEN METABOLIC PANEL: CPT | Performed by: INTERNAL MEDICINE

## 2021-09-26 RX ADMIN — METOPROLOL SUCCINATE 50 MG: 50 TABLET, FILM COATED, EXTENDED RELEASE ORAL at 20:36

## 2021-09-26 RX ADMIN — ALBUTEROL SULFATE 2 PUFF: 90 AEROSOL, METERED RESPIRATORY (INHALATION) at 06:29

## 2021-09-26 RX ADMIN — ALBUTEROL SULFATE 2 PUFF: 90 AEROSOL, METERED RESPIRATORY (INHALATION) at 14:20

## 2021-09-26 RX ADMIN — INSULIN LISPRO 8 UNITS: 100 INJECTION, SOLUTION INTRAVENOUS; SUBCUTANEOUS at 22:10

## 2021-09-26 RX ADMIN — ALBUTEROL SULFATE 2 PUFF: 90 AEROSOL, METERED RESPIRATORY (INHALATION) at 20:19

## 2021-09-26 RX ADMIN — INSULIN LISPRO 7 UNITS: 100 INJECTION, SOLUTION INTRAVENOUS; SUBCUTANEOUS at 17:57

## 2021-09-26 RX ADMIN — CETIRIZINE HYDROCHLORIDE 5 MG: 10 TABLET ORAL at 09:36

## 2021-09-26 RX ADMIN — METFORMIN HYDROCHLORIDE 500 MG: 500 TABLET, EXTENDED RELEASE ORAL at 08:40

## 2021-09-26 RX ADMIN — PANTOPRAZOLE SODIUM 40 MG: 40 TABLET, DELAYED RELEASE ORAL at 09:36

## 2021-09-26 RX ADMIN — SODIUM CHLORIDE, PRESERVATIVE FREE 10 ML: 5 INJECTION INTRAVENOUS at 20:35

## 2021-09-26 RX ADMIN — GLIPIZIDE 2.5 MG: 5 TABLET ORAL at 09:36

## 2021-09-26 RX ADMIN — GLIPIZIDE 2.5 MG: 5 TABLET ORAL at 17:57

## 2021-09-26 RX ADMIN — ASPIRIN 81 MG: 81 TABLET, COATED ORAL at 09:36

## 2021-09-26 RX ADMIN — INSULIN DETEMIR 30 UNITS: 100 INJECTION, SOLUTION SUBCUTANEOUS at 22:18

## 2021-09-26 RX ADMIN — Medication 1000 UNITS: at 09:36

## 2021-09-26 RX ADMIN — DEXAMETHASONE 6 MG: 4 TABLET ORAL at 09:36

## 2021-09-26 RX ADMIN — OXYCODONE HYDROCHLORIDE AND ACETAMINOPHEN 500 MG: 500 TABLET ORAL at 09:36

## 2021-09-26 RX ADMIN — ALBUTEROL SULFATE 2 PUFF: 90 AEROSOL, METERED RESPIRATORY (INHALATION) at 09:38

## 2021-09-26 RX ADMIN — ENOXAPARIN SODIUM 40 MG: 40 INJECTION SUBCUTANEOUS at 09:36

## 2021-09-26 RX ADMIN — SODIUM CHLORIDE, PRESERVATIVE FREE 10 ML: 5 INJECTION INTRAVENOUS at 09:37

## 2021-09-26 RX ADMIN — GUAIFENESIN 1200 MG: 600 TABLET, EXTENDED RELEASE ORAL at 20:34

## 2021-09-26 RX ADMIN — LOSARTAN POTASSIUM 50 MG: 50 TABLET, FILM COATED ORAL at 08:40

## 2021-09-26 RX ADMIN — DIPHENHYDRAMINE HYDROCHLORIDE 25 MG: 25 CAPSULE ORAL at 20:34

## 2021-09-26 RX ADMIN — ATORVASTATIN CALCIUM 10 MG: 10 TABLET, FILM COATED ORAL at 20:35

## 2021-09-26 RX ADMIN — ZINC SULFATE 220 MG (50 MG) CAPSULE 220 MG: CAPSULE at 09:35

## 2021-09-26 RX ADMIN — GUAIFENESIN 1200 MG: 600 TABLET, EXTENDED RELEASE ORAL at 08:40

## 2021-09-27 LAB
CRP SERPL-MCNC: 0.4 MG/DL (ref 0–0.5)
FERRITIN SERPL-MCNC: 378 NG/ML (ref 13–150)
GLUCOSE BLDC GLUCOMTR-MCNC: 151 MG/DL (ref 70–130)
GLUCOSE BLDC GLUCOMTR-MCNC: 286 MG/DL (ref 70–130)
GLUCOSE BLDC GLUCOMTR-MCNC: 294 MG/DL (ref 70–130)

## 2021-09-27 PROCEDURE — 94799 UNLISTED PULMONARY SVC/PX: CPT

## 2021-09-27 PROCEDURE — 86140 C-REACTIVE PROTEIN: CPT | Performed by: INTERNAL MEDICINE

## 2021-09-27 PROCEDURE — 63710000001 INSULIN DETEMIR PER 5 UNITS: Performed by: INTERNAL MEDICINE

## 2021-09-27 PROCEDURE — 25010000002 ENOXAPARIN PER 10 MG: Performed by: INTERNAL MEDICINE

## 2021-09-27 PROCEDURE — 63710000001 INSULIN LISPRO (HUMAN) PER 5 UNITS: Performed by: INTERNAL MEDICINE

## 2021-09-27 PROCEDURE — 82962 GLUCOSE BLOOD TEST: CPT

## 2021-09-27 PROCEDURE — 63710000001 DEXAMETHASONE PER 0.25 MG: Performed by: INTERNAL MEDICINE

## 2021-09-27 PROCEDURE — 82728 ASSAY OF FERRITIN: CPT | Performed by: INTERNAL MEDICINE

## 2021-09-27 PROCEDURE — 63710000001 DIPHENHYDRAMINE PER 50 MG: Performed by: INTERNAL MEDICINE

## 2021-09-27 RX ADMIN — ATORVASTATIN CALCIUM 10 MG: 10 TABLET, FILM COATED ORAL at 21:25

## 2021-09-27 RX ADMIN — METOPROLOL SUCCINATE 50 MG: 50 TABLET, FILM COATED, EXTENDED RELEASE ORAL at 21:25

## 2021-09-27 RX ADMIN — OXYCODONE HYDROCHLORIDE AND ACETAMINOPHEN 500 MG: 500 TABLET ORAL at 08:59

## 2021-09-27 RX ADMIN — METFORMIN HYDROCHLORIDE 500 MG: 500 TABLET, EXTENDED RELEASE ORAL at 09:01

## 2021-09-27 RX ADMIN — INSULIN DETEMIR 30 UNITS: 100 INJECTION, SOLUTION SUBCUTANEOUS at 21:28

## 2021-09-27 RX ADMIN — DEXAMETHASONE 6 MG: 4 TABLET ORAL at 08:59

## 2021-09-27 RX ADMIN — CETIRIZINE HYDROCHLORIDE 5 MG: 10 TABLET ORAL at 08:59

## 2021-09-27 RX ADMIN — INSULIN LISPRO 6 UNITS: 100 INJECTION, SOLUTION INTRAVENOUS; SUBCUTANEOUS at 21:25

## 2021-09-27 RX ADMIN — DIPHENHYDRAMINE HYDROCHLORIDE 25 MG: 25 CAPSULE ORAL at 21:25

## 2021-09-27 RX ADMIN — PANTOPRAZOLE SODIUM 40 MG: 40 TABLET, DELAYED RELEASE ORAL at 10:26

## 2021-09-27 RX ADMIN — GLIPIZIDE 2.5 MG: 5 TABLET ORAL at 17:22

## 2021-09-27 RX ADMIN — SODIUM CHLORIDE, PRESERVATIVE FREE 10 ML: 5 INJECTION INTRAVENOUS at 10:26

## 2021-09-27 RX ADMIN — LOSARTAN POTASSIUM 50 MG: 50 TABLET, FILM COATED ORAL at 09:00

## 2021-09-27 RX ADMIN — ALBUTEROL SULFATE 2 PUFF: 90 AEROSOL, METERED RESPIRATORY (INHALATION) at 07:09

## 2021-09-27 RX ADMIN — ASPIRIN 81 MG: 81 TABLET, COATED ORAL at 09:01

## 2021-09-27 RX ADMIN — ZINC SULFATE 220 MG (50 MG) CAPSULE 220 MG: CAPSULE at 09:00

## 2021-09-27 RX ADMIN — ALBUTEROL SULFATE 2 PUFF: 90 AEROSOL, METERED RESPIRATORY (INHALATION) at 11:08

## 2021-09-27 RX ADMIN — GUAIFENESIN 1200 MG: 600 TABLET, EXTENDED RELEASE ORAL at 21:25

## 2021-09-27 RX ADMIN — INSULIN LISPRO 6 UNITS: 100 INJECTION, SOLUTION INTRAVENOUS; SUBCUTANEOUS at 17:21

## 2021-09-27 RX ADMIN — Medication 1000 UNITS: at 09:00

## 2021-09-27 RX ADMIN — ALBUTEROL SULFATE 2 PUFF: 90 AEROSOL, METERED RESPIRATORY (INHALATION) at 21:06

## 2021-09-27 RX ADMIN — GLIPIZIDE 2.5 MG: 5 TABLET ORAL at 08:59

## 2021-09-27 RX ADMIN — ENOXAPARIN SODIUM 40 MG: 40 INJECTION SUBCUTANEOUS at 10:26

## 2021-09-27 RX ADMIN — SODIUM CHLORIDE, PRESERVATIVE FREE 10 ML: 5 INJECTION INTRAVENOUS at 21:27

## 2021-09-27 RX ADMIN — GUAIFENESIN 1200 MG: 600 TABLET, EXTENDED RELEASE ORAL at 08:59

## 2021-09-27 RX ADMIN — ALBUTEROL SULFATE 2 PUFF: 90 AEROSOL, METERED RESPIRATORY (INHALATION) at 14:27

## 2021-09-28 LAB
GLUCOSE BLDC GLUCOMTR-MCNC: 162 MG/DL (ref 70–130)
GLUCOSE BLDC GLUCOMTR-MCNC: 180 MG/DL (ref 70–130)
GLUCOSE BLDC GLUCOMTR-MCNC: 267 MG/DL (ref 70–130)
GLUCOSE BLDC GLUCOMTR-MCNC: 288 MG/DL (ref 70–130)

## 2021-09-28 PROCEDURE — 82962 GLUCOSE BLOOD TEST: CPT

## 2021-09-28 PROCEDURE — 63710000001 DIPHENHYDRAMINE PER 50 MG: Performed by: INTERNAL MEDICINE

## 2021-09-28 PROCEDURE — 63710000001 INSULIN LISPRO (HUMAN) PER 5 UNITS: Performed by: INTERNAL MEDICINE

## 2021-09-28 PROCEDURE — 63710000001 DEXAMETHASONE PER 0.25 MG: Performed by: INTERNAL MEDICINE

## 2021-09-28 PROCEDURE — 25010000002 ENOXAPARIN PER 10 MG: Performed by: INTERNAL MEDICINE

## 2021-09-28 PROCEDURE — 94799 UNLISTED PULMONARY SVC/PX: CPT

## 2021-09-28 PROCEDURE — 63710000001 INSULIN DETEMIR PER 5 UNITS: Performed by: INTERNAL MEDICINE

## 2021-09-28 RX ORDER — ESOMEPRAZOLE MAGNESIUM 40 MG/1
40 CAPSULE, DELAYED RELEASE ORAL
COMMUNITY

## 2021-09-28 RX ADMIN — LOSARTAN POTASSIUM 50 MG: 50 TABLET, FILM COATED ORAL at 08:53

## 2021-09-28 RX ADMIN — DIPHENHYDRAMINE HYDROCHLORIDE 25 MG: 25 CAPSULE ORAL at 21:15

## 2021-09-28 RX ADMIN — METOPROLOL SUCCINATE 50 MG: 50 TABLET, FILM COATED, EXTENDED RELEASE ORAL at 21:14

## 2021-09-28 RX ADMIN — GLIPIZIDE 2.5 MG: 5 TABLET ORAL at 08:52

## 2021-09-28 RX ADMIN — GUAIFENESIN 1200 MG: 600 TABLET, EXTENDED RELEASE ORAL at 08:53

## 2021-09-28 RX ADMIN — ASPIRIN 81 MG: 81 TABLET, COATED ORAL at 08:53

## 2021-09-28 RX ADMIN — GUAIFENESIN 1200 MG: 600 TABLET, EXTENDED RELEASE ORAL at 21:15

## 2021-09-28 RX ADMIN — ENOXAPARIN SODIUM 40 MG: 40 INJECTION SUBCUTANEOUS at 08:55

## 2021-09-28 RX ADMIN — OXYCODONE HYDROCHLORIDE AND ACETAMINOPHEN 500 MG: 500 TABLET ORAL at 08:53

## 2021-09-28 RX ADMIN — ZINC SULFATE 220 MG (50 MG) CAPSULE 220 MG: CAPSULE at 08:54

## 2021-09-28 RX ADMIN — INSULIN DETEMIR 30 UNITS: 100 INJECTION, SOLUTION SUBCUTANEOUS at 21:16

## 2021-09-28 RX ADMIN — ALBUTEROL SULFATE 2 PUFF: 90 AEROSOL, METERED RESPIRATORY (INHALATION) at 11:25

## 2021-09-28 RX ADMIN — DEXAMETHASONE 6 MG: 4 TABLET ORAL at 08:52

## 2021-09-28 RX ADMIN — ALBUTEROL SULFATE 2 PUFF: 90 AEROSOL, METERED RESPIRATORY (INHALATION) at 19:36

## 2021-09-28 RX ADMIN — ALBUTEROL SULFATE 2 PUFF: 90 AEROSOL, METERED RESPIRATORY (INHALATION) at 07:25

## 2021-09-28 RX ADMIN — GLIPIZIDE 2.5 MG: 5 TABLET ORAL at 17:11

## 2021-09-28 RX ADMIN — ALBUTEROL SULFATE 2 PUFF: 90 AEROSOL, METERED RESPIRATORY (INHALATION) at 15:21

## 2021-09-28 RX ADMIN — SODIUM CHLORIDE, PRESERVATIVE FREE 10 ML: 5 INJECTION INTRAVENOUS at 08:55

## 2021-09-28 RX ADMIN — INSULIN LISPRO 2 UNITS: 100 INJECTION, SOLUTION INTRAVENOUS; SUBCUTANEOUS at 12:08

## 2021-09-28 RX ADMIN — CETIRIZINE HYDROCHLORIDE 5 MG: 10 TABLET ORAL at 08:53

## 2021-09-28 RX ADMIN — ATORVASTATIN CALCIUM 10 MG: 10 TABLET, FILM COATED ORAL at 21:15

## 2021-09-28 RX ADMIN — INSULIN LISPRO 6 UNITS: 100 INJECTION, SOLUTION INTRAVENOUS; SUBCUTANEOUS at 17:11

## 2021-09-28 RX ADMIN — SODIUM CHLORIDE, PRESERVATIVE FREE 10 ML: 5 INJECTION INTRAVENOUS at 21:15

## 2021-09-28 RX ADMIN — METFORMIN HYDROCHLORIDE 500 MG: 500 TABLET, EXTENDED RELEASE ORAL at 08:53

## 2021-09-28 RX ADMIN — Medication 1000 UNITS: at 08:53

## 2021-09-28 RX ADMIN — PANTOPRAZOLE SODIUM 40 MG: 40 TABLET, DELAYED RELEASE ORAL at 08:54

## 2021-09-28 RX ADMIN — INSULIN LISPRO 6 UNITS: 100 INJECTION, SOLUTION INTRAVENOUS; SUBCUTANEOUS at 21:16

## 2021-09-29 LAB
CRP SERPL-MCNC: <0.3 MG/DL (ref 0–0.5)
FERRITIN SERPL-MCNC: 309.2 NG/ML (ref 13–150)
GLUCOSE BLDC GLUCOMTR-MCNC: 141 MG/DL (ref 70–130)
GLUCOSE BLDC GLUCOMTR-MCNC: 214 MG/DL (ref 70–130)
GLUCOSE BLDC GLUCOMTR-MCNC: 306 MG/DL (ref 70–130)
GLUCOSE BLDC GLUCOMTR-MCNC: 361 MG/DL (ref 70–130)
GLUCOSE BLDC GLUCOMTR-MCNC: 426 MG/DL (ref 70–130)

## 2021-09-29 PROCEDURE — 93005 ELECTROCARDIOGRAM TRACING: CPT | Performed by: INTERNAL MEDICINE

## 2021-09-29 PROCEDURE — 63710000001 INSULIN DETEMIR PER 5 UNITS: Performed by: INTERNAL MEDICINE

## 2021-09-29 PROCEDURE — 93010 ELECTROCARDIOGRAM REPORT: CPT | Performed by: INTERNAL MEDICINE

## 2021-09-29 PROCEDURE — 82728 ASSAY OF FERRITIN: CPT | Performed by: INTERNAL MEDICINE

## 2021-09-29 PROCEDURE — 25010000002 ENOXAPARIN PER 10 MG: Performed by: INTERNAL MEDICINE

## 2021-09-29 PROCEDURE — 63710000001 INSULIN LISPRO (HUMAN) PER 5 UNITS: Performed by: INTERNAL MEDICINE

## 2021-09-29 PROCEDURE — 82962 GLUCOSE BLOOD TEST: CPT

## 2021-09-29 PROCEDURE — 94799 UNLISTED PULMONARY SVC/PX: CPT

## 2021-09-29 PROCEDURE — 63710000001 DEXAMETHASONE PER 0.25 MG: Performed by: INTERNAL MEDICINE

## 2021-09-29 PROCEDURE — 86140 C-REACTIVE PROTEIN: CPT | Performed by: INTERNAL MEDICINE

## 2021-09-29 PROCEDURE — 94618 PULMONARY STRESS TESTING: CPT

## 2021-09-29 PROCEDURE — 63710000001 DIPHENHYDRAMINE PER 50 MG: Performed by: INTERNAL MEDICINE

## 2021-09-29 RX ADMIN — METOPROLOL SUCCINATE 50 MG: 50 TABLET, FILM COATED, EXTENDED RELEASE ORAL at 21:56

## 2021-09-29 RX ADMIN — LOSARTAN POTASSIUM 50 MG: 50 TABLET, FILM COATED ORAL at 09:01

## 2021-09-29 RX ADMIN — ASPIRIN 81 MG: 81 TABLET, COATED ORAL at 09:01

## 2021-09-29 RX ADMIN — ENOXAPARIN SODIUM 40 MG: 40 INJECTION SUBCUTANEOUS at 09:00

## 2021-09-29 RX ADMIN — SODIUM CHLORIDE, PRESERVATIVE FREE 10 ML: 5 INJECTION INTRAVENOUS at 09:00

## 2021-09-29 RX ADMIN — INSULIN LISPRO 4 UNITS: 100 INJECTION, SOLUTION INTRAVENOUS; SUBCUTANEOUS at 12:23

## 2021-09-29 RX ADMIN — DEXAMETHASONE 6 MG: 4 TABLET ORAL at 09:01

## 2021-09-29 RX ADMIN — DIPHENHYDRAMINE HYDROCHLORIDE 25 MG: 25 CAPSULE ORAL at 21:53

## 2021-09-29 RX ADMIN — ALBUTEROL SULFATE 2 PUFF: 90 AEROSOL, METERED RESPIRATORY (INHALATION) at 19:26

## 2021-09-29 RX ADMIN — ALBUTEROL SULFATE 2 PUFF: 90 AEROSOL, METERED RESPIRATORY (INHALATION) at 06:51

## 2021-09-29 RX ADMIN — INSULIN LISPRO 8 UNITS: 100 INJECTION, SOLUTION INTRAVENOUS; SUBCUTANEOUS at 17:00

## 2021-09-29 RX ADMIN — Medication 1000 UNITS: at 09:01

## 2021-09-29 RX ADMIN — GUAIFENESIN 1200 MG: 600 TABLET, EXTENDED RELEASE ORAL at 09:01

## 2021-09-29 RX ADMIN — GLIPIZIDE 2.5 MG: 5 TABLET ORAL at 17:00

## 2021-09-29 RX ADMIN — ATORVASTATIN CALCIUM 10 MG: 10 TABLET, FILM COATED ORAL at 21:52

## 2021-09-29 RX ADMIN — OXYCODONE HYDROCHLORIDE AND ACETAMINOPHEN 500 MG: 500 TABLET ORAL at 09:01

## 2021-09-29 RX ADMIN — METFORMIN HYDROCHLORIDE 500 MG: 500 TABLET, EXTENDED RELEASE ORAL at 09:00

## 2021-09-29 RX ADMIN — GUAIFENESIN 1200 MG: 600 TABLET, EXTENDED RELEASE ORAL at 21:52

## 2021-09-29 RX ADMIN — ALBUTEROL SULFATE 2 PUFF: 90 AEROSOL, METERED RESPIRATORY (INHALATION) at 10:20

## 2021-09-29 RX ADMIN — SODIUM CHLORIDE, PRESERVATIVE FREE 10 ML: 5 INJECTION INTRAVENOUS at 21:53

## 2021-09-29 RX ADMIN — ZINC SULFATE 220 MG (50 MG) CAPSULE 220 MG: CAPSULE at 09:00

## 2021-09-29 RX ADMIN — GLIPIZIDE 2.5 MG: 5 TABLET ORAL at 09:01

## 2021-09-29 RX ADMIN — INSULIN DETEMIR 30 UNITS: 100 INJECTION, SOLUTION SUBCUTANEOUS at 21:53

## 2021-09-29 RX ADMIN — CETIRIZINE HYDROCHLORIDE 5 MG: 10 TABLET ORAL at 09:01

## 2021-09-29 RX ADMIN — ALBUTEROL SULFATE 2 PUFF: 90 AEROSOL, METERED RESPIRATORY (INHALATION) at 15:30

## 2021-09-29 RX ADMIN — PANTOPRAZOLE SODIUM 40 MG: 40 TABLET, DELAYED RELEASE ORAL at 09:01

## 2021-09-29 RX ADMIN — INSULIN LISPRO 7 UNITS: 100 INJECTION, SOLUTION INTRAVENOUS; SUBCUTANEOUS at 22:27

## 2021-09-30 ENCOUNTER — READMISSION MANAGEMENT (OUTPATIENT)
Dept: CALL CENTER | Facility: HOSPITAL | Age: 76
End: 2021-09-30

## 2021-09-30 VITALS
HEART RATE: 81 BPM | BODY MASS INDEX: 26.82 KG/M2 | HEIGHT: 66 IN | SYSTOLIC BLOOD PRESSURE: 180 MMHG | DIASTOLIC BLOOD PRESSURE: 82 MMHG | WEIGHT: 166.89 LBS | OXYGEN SATURATION: 93 % | TEMPERATURE: 97.7 F | RESPIRATION RATE: 22 BRPM

## 2021-09-30 PROBLEM — D89.832 CYTOKINE RELEASE SYNDROME, GRADE 2: Status: ACTIVE | Noted: 2021-09-30

## 2021-09-30 LAB
ANION GAP SERPL CALCULATED.3IONS-SCNC: 17 MMOL/L (ref 5–15)
BUN SERPL-MCNC: 23 MG/DL (ref 8–23)
BUN/CREAT SERPL: 27.7 (ref 7–25)
CALCIUM SPEC-SCNC: 9.8 MG/DL (ref 8.6–10.5)
CHLORIDE SERPL-SCNC: 99 MMOL/L (ref 98–107)
CO2 SERPL-SCNC: 18 MMOL/L (ref 22–29)
CREAT SERPL-MCNC: 0.83 MG/DL (ref 0.57–1)
GFR SERPL CREATININE-BSD FRML MDRD: 67 ML/MIN/1.73
GLUCOSE BLDC GLUCOMTR-MCNC: 173 MG/DL (ref 70–130)
GLUCOSE BLDC GLUCOMTR-MCNC: 189 MG/DL (ref 70–130)
GLUCOSE BLDC GLUCOMTR-MCNC: 196 MG/DL (ref 70–130)
GLUCOSE SERPL-MCNC: 347 MG/DL (ref 65–99)
MAGNESIUM SERPL-MCNC: 2 MG/DL (ref 1.6–2.4)
PHOSPHATE SERPL-MCNC: 3.5 MG/DL (ref 2.5–4.5)
POTASSIUM SERPL-SCNC: 4.7 MMOL/L (ref 3.5–5.2)
QT INTERVAL: 394 MS
QTC INTERVAL: 465 MS
SODIUM SERPL-SCNC: 134 MMOL/L (ref 136–145)
TROPONIN T SERPL-MCNC: <0.01 NG/ML (ref 0–0.03)

## 2021-09-30 PROCEDURE — 94799 UNLISTED PULMONARY SVC/PX: CPT

## 2021-09-30 PROCEDURE — 84100 ASSAY OF PHOSPHORUS: CPT | Performed by: INTERNAL MEDICINE

## 2021-09-30 PROCEDURE — 82962 GLUCOSE BLOOD TEST: CPT

## 2021-09-30 PROCEDURE — 84484 ASSAY OF TROPONIN QUANT: CPT | Performed by: INTERNAL MEDICINE

## 2021-09-30 PROCEDURE — 25010000002 ENOXAPARIN PER 10 MG: Performed by: INTERNAL MEDICINE

## 2021-09-30 PROCEDURE — 63710000001 INSULIN LISPRO (HUMAN) PER 5 UNITS: Performed by: INTERNAL MEDICINE

## 2021-09-30 PROCEDURE — 94618 PULMONARY STRESS TESTING: CPT

## 2021-09-30 PROCEDURE — 80048 BASIC METABOLIC PNL TOTAL CA: CPT | Performed by: INTERNAL MEDICINE

## 2021-09-30 PROCEDURE — 83735 ASSAY OF MAGNESIUM: CPT | Performed by: INTERNAL MEDICINE

## 2021-09-30 RX ORDER — METFORMIN HYDROCHLORIDE 500 MG/1
500 TABLET, EXTENDED RELEASE ORAL DAILY
Qty: 30 TABLET | Refills: 3 | Status: SHIPPED | OUTPATIENT
Start: 2021-09-30

## 2021-09-30 RX ADMIN — OXYCODONE HYDROCHLORIDE AND ACETAMINOPHEN 500 MG: 500 TABLET ORAL at 10:00

## 2021-09-30 RX ADMIN — INSULIN LISPRO 2 UNITS: 100 INJECTION, SOLUTION INTRAVENOUS; SUBCUTANEOUS at 12:20

## 2021-09-30 RX ADMIN — CETIRIZINE HYDROCHLORIDE 5 MG: 10 TABLET ORAL at 10:00

## 2021-09-30 RX ADMIN — GUAIFENESIN 1200 MG: 600 TABLET, EXTENDED RELEASE ORAL at 10:00

## 2021-09-30 RX ADMIN — INSULIN LISPRO 2 UNITS: 100 INJECTION, SOLUTION INTRAVENOUS; SUBCUTANEOUS at 17:24

## 2021-09-30 RX ADMIN — PANTOPRAZOLE SODIUM 40 MG: 40 TABLET, DELAYED RELEASE ORAL at 10:01

## 2021-09-30 RX ADMIN — METFORMIN HYDROCHLORIDE 500 MG: 500 TABLET, EXTENDED RELEASE ORAL at 10:00

## 2021-09-30 RX ADMIN — LOSARTAN POTASSIUM 50 MG: 50 TABLET, FILM COATED ORAL at 10:00

## 2021-09-30 RX ADMIN — SODIUM CHLORIDE, PRESERVATIVE FREE 10 ML: 5 INJECTION INTRAVENOUS at 10:02

## 2021-09-30 RX ADMIN — INSULIN LISPRO 2 UNITS: 100 INJECTION, SOLUTION INTRAVENOUS; SUBCUTANEOUS at 10:01

## 2021-09-30 RX ADMIN — Medication 1000 UNITS: at 10:00

## 2021-09-30 RX ADMIN — ZINC SULFATE 220 MG (50 MG) CAPSULE 220 MG: CAPSULE at 10:01

## 2021-09-30 RX ADMIN — GLIPIZIDE 2.5 MG: 5 TABLET ORAL at 10:01

## 2021-09-30 RX ADMIN — ALBUTEROL SULFATE 2 PUFF: 90 AEROSOL, METERED RESPIRATORY (INHALATION) at 11:39

## 2021-09-30 RX ADMIN — ASPIRIN 81 MG: 81 TABLET, COATED ORAL at 10:00

## 2021-09-30 RX ADMIN — ENOXAPARIN SODIUM 40 MG: 40 INJECTION SUBCUTANEOUS at 10:02

## 2021-09-30 RX ADMIN — ALBUTEROL SULFATE 2 PUFF: 90 AEROSOL, METERED RESPIRATORY (INHALATION) at 07:48

## 2021-09-30 RX ADMIN — ALBUTEROL SULFATE 2 PUFF: 90 AEROSOL, METERED RESPIRATORY (INHALATION) at 15:51

## 2021-09-30 RX ADMIN — GLIPIZIDE 2.5 MG: 5 TABLET ORAL at 17:25

## 2021-10-01 ENCOUNTER — READMISSION MANAGEMENT (OUTPATIENT)
Dept: CALL CENTER | Facility: HOSPITAL | Age: 76
End: 2021-10-01

## 2021-10-02 ENCOUNTER — NURSE TRIAGE (OUTPATIENT)
Dept: CALL CENTER | Facility: HOSPITAL | Age: 76
End: 2021-10-02

## 2021-10-02 ENCOUNTER — READMISSION MANAGEMENT (OUTPATIENT)
Dept: CALL CENTER | Facility: HOSPITAL | Age: 76
End: 2021-10-02

## 2021-10-02 NOTE — OUTREACH NOTE
COVID-19 Week 1 Survey      Responses   [facility] patient discharged from?  [place]   Does the patient have one of the following disease processes/diagnoses(primary or secondary)?  COVID-19   COVID-19 underlying condition?  None   Call Number  Call 2   Week 1 Call successful?  Yes   Call start time  1115   Call end time  1118   Discharge diagnosis  Pneumonia due to COVID-19 virus   Meds reviewed with patient/caregiver?  Yes   Is the patient having any side effects they believe may be caused by any medication additions or changes?  No   Does the patient have all medications ordered at discharge?  Yes   Is the patient taking all medications as directed (includes completed medication regime)?  Yes   Does the patient have a primary care provider?   Yes   Comments regarding PCP  PATIENT STATES SHE WILL CALL MONDAY TO MAKE A PCP FOLLOW UP APPOINTMENT   Does the patient have an appointment with their PCP or specialist within 7 days of discharge?  No   What is preventing the patient from scheduling follow up appointments within 7 days of discharge?  Haven't had time   Nursing Interventions  Educated patient on importance of making appointment, Advised patient to make appointment   Has the patient kept scheduled appointments due by today?  N/A   What is the Home health agency?   [facility] WILL SEE HER MONDAY 10/4/21   Has home health visited the patient within 72 hours of discharge?  Call prior to 72 hours   What DME was ordered?   [email or web address]   Has all DME been delivered?  Yes   Psychosocial issues?  No   Did the patient receive a copy of their discharge instructions?  Yes   Did the patient receive a copy of COVID-19 specific instructions?  Yes   Nursing interventions  Reviewed instructions with patient   What is the patient's perception of their health status since discharge?  Improving   Does the patient have any of the following symptoms?  Shortness of breath   Nursing Interventions  Nurse provided patient  education   Pulse Ox monitoring  Intermittent   Pulse Ox device source  Patient   O2 Sat comments  PATIENT STATES HER O2 SATS ARE STAYING IN THE 90S   O2 Sat: education provided  Sat levels, Monitoring frequency, When to seek care   Is the patient/caregiver able to teach back steps to recovery at home?  Set small, achievable goals for return to baseline health, Rest and rebuild strength, gradually increase activity, Practice good oral hygiene, Eat a well-balance diet, Make a list of questions for provider's appointment   If the patient is a current smoker, are they able to teach back resources for cessation?  Not a smoker   Is the patient/caregiver able to teach back the hierarchy of who to call/visit for symptoms/problems? PCP, Specialist, Home health nurse, Urgent Care, ED, 911  Yes   COVID-19 call completed?  Yes          Shyann Collazo LPN

## 2021-10-02 NOTE — TELEPHONE ENCOUNTER
"She was recently discharged from [facility]- pneumonia and covid - Her BS this am is 361. She just took po pill Glucophage.     Reason for Disposition  • [1] Caller has URGENT medication or insulin pump question AND [2] triager unable to answer question    Additional Information  • Negative: Unconscious or difficult to awaken  • Negative: Acting confused (e.g., disoriented, slurred speech)  • Negative: Very weak (e.g., can't stand)  • Negative: Sounds like a life-threatening emergency to the triager  • Negative: [1] Vomiting AND [2] signs of dehydration (e.g., very dry mouth, lightheaded, dark urine)  • Negative: [1] Blood glucose > 240 mg/dL (13.3 mmol/L) AND [2] rapid breathing  • Negative: Blood glucose > 500 mg/dL (27.8 mmol/L)  • Negative: [1] Blood glucose > 240 mg/dL (13.3 mmol/L) AND [2] urine ketones moderate-large (or more than 1+)  • Negative: [1] Blood glucose > 240 mg/dL (13.3 mmol/L) AND [2] blood ketones > 1.4 mmol/L  • Negative: [1] Blood glucose > 240 mg/dL (13.3 mmol/L) AND [2] vomiting AND [3] unable to check for ketones (in blood or urine)  • Negative: [1] New-onset diabetes suspected (e.g., frequent urination, weak, weight loss) AND [2] vomiting or rapid breathing  • Negative: Vomiting lasts > 4 hours  • Negative: Patient sounds very sick or weak to the triager  • Negative: Fever > 100.4 F (38.0 C)  • Negative: Blood glucose > 400 mg/dL (22.2 mmol/L)  • Negative: [1] Blood glucose > 300 mg/dL (16.7 mmol/L) AND [2] two or more times in a row  • Negative: Urine ketones moderate - large (or blood ketones > 1.4 mmol/L)    Answer Assessment - Initial Assessment Questions  1. BLOOD GLUCOSE: \"What is your blood glucose level?\"       *No Answer*361   2. ONSET: \"When did you check the blood glucose?\"      This am   3. USUAL RANGE: \"What is your glucose level usually?\" (e.g., usual fasting morning value, usual evening value)      100 to 150   4. KETONES: \"Do you check for ketones (urine or blood test " "strips)?\" If yes, ask: \"What does the test show now?\"       n  5. TYPE 1 or 2:  \"Do you know what type of diabetes you have?\"  (e.g., Type 1, Type 2, Gestational; doesn't know)       ?  6. INSULIN: \"Do you take insulin?\" \"What type of insulin(s) do you use? What is the mode of delivery? (syringe, pen; injection or pump)?\"       levemir and glucophage   7. DIABETES PILLS: \"Do you take any pills for your diabetes?\" If yes, ask: \"Have you missed taking any pills recently?\"      Yes not missed any medication.   8. OTHER SYMPTOMS: \"Do you have any symptoms?\" (e.g., fever, frequent urination, difficulty breathing, dizziness, weakness, vomiting)      no  9. PREGNANCY: \"Is there any chance you are pregnant?\" \"When was your last menstrual period?\"      *No Answer*    Protocols used: DIABETES - HIGH BLOOD SUGAR-ADULT-AH      "

## 2021-10-03 ENCOUNTER — NURSE TRIAGE (OUTPATIENT)
Dept: CALL CENTER | Facility: HOSPITAL | Age: 76
End: 2021-10-03

## 2021-10-03 ENCOUNTER — READMISSION MANAGEMENT (OUTPATIENT)
Dept: CALL CENTER | Facility: HOSPITAL | Age: 76
End: 2021-10-03

## 2021-10-03 NOTE — TELEPHONE ENCOUNTER
"Caller states her BS was 398 early this morning, now BS is 402 with no s/s.  does not check for ketones.  was discharged from [facility] on 09/30/21 with covid. Instructed per Care Advice-declines to go to ER, requests to speak with PCP. Call to [facility] for Dr Nicole. Ordered patient to take extra Metformin  mg and extra Glipizide 5 mg now, and to monitor BS closely today. Patient also takes Levemir 36 units at bedtime. Patient voiced understanding- will f/u with Dr Nicole tomorrow, and will call if any further questions/concerns.     Reason for Disposition  • [1] Blood glucose > 240 mg/dL (13.3 mmol/L) AND [2] vomiting AND [3] unable to check for ketones (in blood or urine)    Additional Information  • Negative: Unconscious or difficult to awaken  • Negative: Acting confused (e.g., disoriented, slurred speech)  • Negative: Very weak (e.g., can't stand)  • Negative: Sounds like a life-threatening emergency to the triager  • Negative: [1] Vomiting AND [2] signs of dehydration (e.g., very dry mouth, lightheaded, dark urine)  • Negative: [1] Blood glucose > 240 mg/dL (13.3 mmol/L) AND [2] rapid breathing  • Negative: Blood glucose > 500 mg/dL (27.8 mmol/L)  • Negative: [1] Blood glucose > 240 mg/dL (13.3 mmol/L) AND [2] urine ketones moderate-large (or more than 1+)  • Negative: [1] Blood glucose > 240 mg/dL (13.3 mmol/L) AND [2] blood ketones > 1.4 mmol/L    Answer Assessment - Initial Assessment Questions  1. BLOOD GLUCOSE: \"What is your blood glucose level?\"       402 now  2. ONSET: \"When did you check the blood glucose?\"      now  3. USUAL RANGE: \"What is your glucose level usually?\" (e.g., usual fasting morning value, usual evening value)      Usually 120 when well.  4. KETONES: \"Do you check for ketones (urine or blood test strips)?\" If yes, ask: \"What does the test show now?\"       no  5. TYPE 1 or 2:  \"Do you know what type of diabetes you have?\"  (e.g., Type 1, Type 2, " "Gestational; doesn't know)       Type 2  6. INSULIN: \"Do you take insulin?\" \"What type of insulin(s) do you use? What is the mode of delivery? (syringe, pen; injection or pump)?\"       Metformin 500 mg, levemir 36 units at HS, glipizide 5 units in the am  7. DIABETES PILLS: \"Do you take any pills for your diabetes?\" If yes, ask: \"Have you missed taking any pills recently?\"      Metformin 500 mg once per day.  8. OTHER SYMPTOMS: \"Do you have any symptoms?\" (e.g., fever, frequent urination, difficulty breathing, dizziness, weakness, vomiting)      Weakness, shakey which is better.  9. PREGNANCY: \"Is there any chance you are pregnant?\" \"When was your last menstrual period?\"      na    Protocols used: DIABETES - HIGH BLOOD SUGAR-ADULT-AH      "

## 2021-10-03 NOTE — OUTREACH NOTE
COVID-19 Week 1 Survey      Responses   [facility] patient discharged from?  [place]   Does the patient have one of the following disease processes/diagnoses(primary or secondary)?  COVID-19   COVID-19 underlying condition?  None   Call Number  Call 3   Week 1 Call successful?  Yes   Call start time  1142   Call end time  1149   Discharge diagnosis  Pneumonia due to COVID-19 virus   Person spoke with today (if not patient) and relationship  patient   Meds reviewed with patient/caregiver?  Yes   Is the patient having any side effects they believe may be caused by any medication additions or changes?  No   Does the patient have all medications ordered at discharge?  Yes   Is the patient taking all medications as directed (includes completed medication regime)?  Yes   Does the patient have a primary care provider?   Yes   Does the patient have an appointment with their PCP or specialist within 7 days of discharge?  No   What is preventing the patient from scheduling follow up appointments within 7 days of discharge?  Haven't had time   Nursing Interventions  Educated patient on importance of making appointment, Advised patient to make appointment   Has the patient kept scheduled appointments due by today?  N/A   What is the Home health agency?   [facility] WILL SEE HER MONDAY 10/4/21   Has home health visited the patient within 72 hours of discharge?  Yes   What DME was ordered?   [employer]   Has all DME been delivered?  Yes   Psychosocial issues?  No   What is the patient's perception of their health status since discharge?  Improving   Does the patient have any of the following symptoms?  Shortness of breath   Nursing Interventions  Nurse provided patient education   Pulse Ox monitoring  Intermittent   Pulse Ox device source  Patient   O2 Sat comments  96% 2LO2   O2 Sat: education provided  Sat levels, Monitoring frequency, When to seek care   O2 Sat education comments  sats remaining below 90% call 911/go to  ED   Is the patient/caregiver able to teach back steps to recovery at home?  Rest and rebuild strength, gradually increase activity, Eat a well-balance diet   COVID-19 call completed?  Yes   Wrap up additional comments  Pt states she is doing ok,  reports SOB on exertion. Pt shares  this morning r/t steriods in hospital per pt. Pt advised to call PCP , or report to  nurse high BG. Pt verbalized understanding. Questions/concerns addressed.          Rebeca Curry RN

## 2021-10-03 NOTE — TELEPHONE ENCOUNTER
"Her BS is 398-  Now- She just now took the medication. Advised to check BS in 1 hour. If it does not go down, go to Ed.     Reason for Disposition  • [1] Blood glucose > 300 mg/dL (16.7 mmol/L) AND [2] two or more times in a row    Additional Information  • Negative: Unconscious or difficult to awaken  • Negative: Acting confused (e.g., disoriented, slurred speech)  • Negative: Very weak (e.g., can't stand)  • Negative: Sounds like a life-threatening emergency to the triager  • Negative: [1] Vomiting AND [2] signs of dehydration (e.g., very dry mouth, lightheaded, dark urine)  • Negative: [1] Blood glucose > 240 mg/dL (13.3 mmol/L) AND [2] rapid breathing  • Negative: Blood glucose > 500 mg/dL (27.8 mmol/L)  • Negative: [1] Blood glucose > 240 mg/dL (13.3 mmol/L) AND [2] urine ketones moderate-large (or more than 1+)  • Negative: [1] Blood glucose > 240 mg/dL (13.3 mmol/L) AND [2] blood ketones > 1.4 mmol/L  • Negative: [1] Blood glucose > 240 mg/dL (13.3 mmol/L) AND [2] vomiting AND [3] unable to check for ketones (in blood or urine)  • Negative: [1] New-onset diabetes suspected (e.g., frequent urination, weak, weight loss) AND [2] vomiting or rapid breathing  • Negative: Vomiting lasts > 4 hours  • Negative: Patient sounds very sick or weak to the triager  • Negative: Fever > 100.4 F (38.0 C)  • Negative: Blood glucose > 400 mg/dL (22.2 mmol/L)    Answer Assessment - Initial Assessment Questions  1. BLOOD GLUCOSE: \"What is your blood glucose level?\"       398  2. ONSET: \"When did you check the blood glucose?\"      This am   3. USUAL RANGE: \"What is your glucose level usually?\" (e.g., usual fasting morning value, usual evening value)      100 or so   4. KETONES: \"Do you check for ketones (urine or blood test strips)?\" If yes, ask: \"What does the test show now?\"       *No Answer*  5. TYPE 1 or 2:  \"Do you know what type of diabetes you have?\"  (e.g., Type 1, Type 2, Gestational; doesn't know)       unknown  6. " "INSULIN: \"Do you take insulin?\" \"What type of insulin(s) do you use? What is the mode of delivery? (syringe, pen; injection or pump)?\"       Yes   7. DIABETES PILLS: \"Do you take any pills for your diabetes?\" If yes, ask: \"Have you missed taking any pills recently?\"      Yes   8. OTHER SYMPTOMS: \"Do you have any symptoms?\" (e.g., fever, frequent urination, difficulty breathing, dizziness, weakness, vomiting)      none  9. PREGNANCY: \"Is there any chance you are pregnant?\" \"When was your last menstrual period?\"      n    Protocols used: DIABETES - HIGH BLOOD SUGAR-ADULT-AH      "

## 2021-10-06 ENCOUNTER — HOME HEALTH ADMISSION (OUTPATIENT)
Dept: HOME HEALTH SERVICES | Facility: HOME HEALTHCARE | Age: 76
End: 2021-10-06

## 2021-10-06 ENCOUNTER — TRANSCRIBE ORDERS (OUTPATIENT)
Dept: HOME HEALTH SERVICES | Facility: HOME HEALTHCARE | Age: 76
End: 2021-10-06

## 2021-10-06 DIAGNOSIS — J12.82 PNEUMONIA DUE TO CORONAVIRUS DISEASE 2019 (CODE): Primary | ICD-10-CM

## 2021-10-07 ENCOUNTER — READMISSION MANAGEMENT (OUTPATIENT)
Dept: CALL CENTER | Facility: HOSPITAL | Age: 76
End: 2021-10-07

## 2021-10-07 NOTE — OUTREACH NOTE
COVID-19 Week 2 Survey      Responses   [facility], [place] patient discharged from?  [place]   Does the patient have one of the following disease processes/diagnoses(primary or secondary)?  COVID-19   COVID-19 underlying condition?  None   Call Number  Call 1   COVID-19 Week 2: Call 1 attempt successful?  Yes   Call start time  1147   Call end time  1156   Discharge diagnosis  Pneumonia due to COVID-19 virus   Meds reviewed with patient/caregiver?  Yes   Is the patient taking all medications as directed (includes completed medication regime)?  Yes   Comments regarding PCP  Pt had a telehealth visit 10/5/21 with PCP.   Does the patient have an appointment with their PCP or specialist within 7 days of discharge?  Greater than 7 days   What is preventing the patient from scheduling follow up appointments within 7 days of discharge?  Earlier appointment not available   Nursing Interventions  Verified appointment date/time/provider   Has the patient kept scheduled appointments due by today?  Yes   What is the Home health agency?   Waiting on  call back to set up appt per PCP.   Has home health visited the patient within 72 hours of discharge?  No   Psychosocial issues?  No   Nursing interventions  Reviewed instructions with patient   What is the patient's perception of their health status since discharge?  Improving   Does the patient have any of the following symptoms?  None [Weakness]   Nursing Interventions  Nurse provided patient education   Pulse Ox monitoring  Intermittent   O2 Sat comments  95% on 2L   O2 Sat: education provided  When to seek care, Sat levels   O2 Sat education comments  sats remaining below 90% call 911/go to ED   Is the patient/caregiver able to teach back steps to recovery at home?  Rest and rebuild strength, gradually increase activity, Eat a well-balance diet   COVID-19 call completed?  Yes   Wrap up additional comments  Pt states improving. Waiting on   to call to set up visit per  telehealth call with PCP.           Felisha Machado RN

## 2021-10-08 ENCOUNTER — HOME CARE VISIT (OUTPATIENT)
Dept: HOME HEALTH SERVICES | Facility: CLINIC | Age: 76
End: 2021-10-08

## 2021-10-08 PROCEDURE — G0299 HHS/HOSPICE OF RN EA 15 MIN: HCPCS

## 2021-10-11 VITALS
RESPIRATION RATE: 18 BRPM | HEIGHT: 66 IN | WEIGHT: 168 LBS | SYSTOLIC BLOOD PRESSURE: 112 MMHG | OXYGEN SATURATION: 95 % | DIASTOLIC BLOOD PRESSURE: 60 MMHG | TEMPERATURE: 97.4 F | BODY MASS INDEX: 27 KG/M2 | HEART RATE: 92 BPM

## 2021-10-12 ENCOUNTER — HOME CARE VISIT (OUTPATIENT)
Dept: HOME HEALTH SERVICES | Facility: CLINIC | Age: 76
End: 2021-10-12

## 2021-10-12 VITALS
HEART RATE: 94 BPM | SYSTOLIC BLOOD PRESSURE: 120 MMHG | RESPIRATION RATE: 18 BRPM | OXYGEN SATURATION: 94 % | DIASTOLIC BLOOD PRESSURE: 62 MMHG | TEMPERATURE: 98.4 F

## 2021-10-12 PROCEDURE — G0300 HHS/HOSPICE OF LPN EA 15 MIN: HCPCS

## 2021-10-14 ENCOUNTER — READMISSION MANAGEMENT (OUTPATIENT)
Dept: CALL CENTER | Facility: HOSPITAL | Age: 76
End: 2021-10-14

## 2021-10-14 ENCOUNTER — HOME CARE VISIT (OUTPATIENT)
Dept: HOME HEALTH SERVICES | Facility: CLINIC | Age: 76
End: 2021-10-14

## 2021-10-14 VITALS
HEART RATE: 86 BPM | RESPIRATION RATE: 18 BRPM | OXYGEN SATURATION: 94 % | DIASTOLIC BLOOD PRESSURE: 62 MMHG | TEMPERATURE: 98.3 F | SYSTOLIC BLOOD PRESSURE: 118 MMHG

## 2021-10-14 PROCEDURE — G0300 HHS/HOSPICE OF LPN EA 15 MIN: HCPCS

## 2021-10-14 NOTE — OUTREACH NOTE
COVID-19 Week 3 Survey      Responses   [facility], [place] patient discharged from? [place]   Does the patient have one of the following disease processes/diagnoses(primary or secondary)? COVID-19   COVID-19 underlying condition? None   Call Number Call 1   COVID-19 Week 3: Call 1 attempt successful? Yes   Call start time 0959   Call end time 1008   Discharge diagnosis Pneumonia due to COVID-19 virus   Meds reviewed with patient/caregiver? Yes   Comments regarding PCP Pt had a telehealth visit 10/5/21 with PCP.   Has home health visited the patient within 72 hours of discharge? Yes   What is the patient's perception of their health status since discharge? Improving   Does the patient have any of the following symptoms? None   Pulse Ox monitoring Intermittent   O2 Sat comments Pt only using 02 @ night.   COVID-19 call completed? Yes   Wrap up additional comments Pt reports feeling much better. HH continues to visit. Pt does report weaning down on 02.          Kristine Kessler RN

## 2021-10-19 ENCOUNTER — HOME CARE VISIT (OUTPATIENT)
Dept: HOME HEALTH SERVICES | Facility: CLINIC | Age: 76
End: 2021-10-19

## 2021-10-19 VITALS
TEMPERATURE: 98 F | RESPIRATION RATE: 18 BRPM | DIASTOLIC BLOOD PRESSURE: 58 MMHG | SYSTOLIC BLOOD PRESSURE: 110 MMHG | HEART RATE: 88 BPM | OXYGEN SATURATION: 94 %

## 2021-10-19 PROCEDURE — G0300 HHS/HOSPICE OF LPN EA 15 MIN: HCPCS

## 2021-10-21 ENCOUNTER — READMISSION MANAGEMENT (OUTPATIENT)
Dept: CALL CENTER | Facility: HOSPITAL | Age: 76
End: 2021-10-21

## 2021-10-21 NOTE — OUTREACH NOTE
COVID-19 Week 4 Survey      Responses   [facility] patient discharged from? [place]   Does the patient have one of the following disease processes/diagnoses(primary or secondary)? COVID-19   COVID-19 underlying condition? None   Call Number Call 1   COVID-19 Week 4: Call 1 attempt successful? Yes   Call start time 1512   Call end time 1519   Discharge diagnosis Pneumonia due to COVID-19 virus   Is the patient still receiving Home Health Services? Yes   What is the patient's perception of their health status since discharge? Improving   Does the patient have any of the following symptoms? None   Nursing Interventions Nurse provided patient education   Pulse Ox monitoring Intermittent   Pulse Ox device source Patient   O2 Sat comments O2 sat 94-96% on room air. Uses O2 at night   O2 Sat: education provided Sat levels,  Monitoring frequency,  When to seek care   O2 Sat education comments sats remaining below 90% call 911/go to ED   Is the patient/caregiver able to teach back steps to recovery at home? Set small, achievable goals for return to baseline health,  Rest and rebuild strength, gradually increase activity,  Eat a well-balance diet   If the patient is a current smoker, are they able to teach back resources for cessation? Not a smoker   Is the patient/caregiver able to teach back the hierarchy of who to call/visit for symptoms/problems? PCP, Specialist, Home health nurse, Urgent Care, ED, 911 Yes   Is the patient interested in additional calls from an ambulatory ?  NOTE:  applies to high risk patients requiring additional follow-up. No   Did the patient feel the follow up calls were helpful during their recovery period? Yes   Was the number of calls appropriate? Yes          Vandana Lindsay RN

## 2021-10-25 ENCOUNTER — HOME CARE VISIT (OUTPATIENT)
Dept: HOME HEALTH SERVICES | Facility: CLINIC | Age: 76
End: 2021-10-25

## 2021-10-25 PROCEDURE — G0495 RN CARE TRAIN/EDU IN HH: HCPCS

## 2021-10-27 VITALS
HEART RATE: 102 BPM | SYSTOLIC BLOOD PRESSURE: 127 MMHG | OXYGEN SATURATION: 97 % | RESPIRATION RATE: 18 BRPM | DIASTOLIC BLOOD PRESSURE: 62 MMHG | TEMPERATURE: 97.4 F

## 2021-10-28 ENCOUNTER — TRANSCRIBE ORDERS (OUTPATIENT)
Dept: ADMINISTRATIVE | Facility: HOSPITAL | Age: 76
End: 2021-10-28

## 2021-10-28 DIAGNOSIS — R19.7 DIARRHEA, UNSPECIFIED TYPE: ICD-10-CM

## 2021-10-28 DIAGNOSIS — R19.5 OTHER FECAL ABNORMALITIES: ICD-10-CM

## 2021-10-28 DIAGNOSIS — Z86.16 PERSONAL HISTORY OF COVID-19: Primary | ICD-10-CM

## 2021-10-29 ENCOUNTER — LAB (OUTPATIENT)
Dept: LAB | Facility: HOSPITAL | Age: 76
End: 2021-10-29

## 2021-10-29 DIAGNOSIS — Z86.16 PERSONAL HISTORY OF COVID-19: ICD-10-CM

## 2021-10-29 DIAGNOSIS — R19.5 OTHER FECAL ABNORMALITIES: ICD-10-CM

## 2021-10-29 DIAGNOSIS — R19.7 DIARRHEA, UNSPECIFIED TYPE: ICD-10-CM

## 2021-10-29 LAB
ADV 40+41 DNA STL QL NAA+NON-PROBE: NOT DETECTED
ASTRO TYP 1-8 RNA STL QL NAA+NON-PROBE: NOT DETECTED
C CAYETANENSIS DNA STL QL NAA+NON-PROBE: NOT DETECTED
C COLI+JEJ+UPSA DNA STL QL NAA+NON-PROBE: NOT DETECTED
C DIFF TOX GENS STL QL NAA+PROBE: NEGATIVE
CRYPTOSP DNA STL QL NAA+NON-PROBE: NOT DETECTED
E HISTOLYT DNA STL QL NAA+NON-PROBE: NOT DETECTED
EAEC PAA PLAS AGGR+AATA ST NAA+NON-PRB: NOT DETECTED
EC STX1+STX2 GENES STL QL NAA+NON-PROBE: NOT DETECTED
EPEC EAE GENE STL QL NAA+NON-PROBE: NOT DETECTED
ETEC LTA+ST1A+ST1B TOX ST NAA+NON-PROBE: NOT DETECTED
G LAMBLIA DNA STL QL NAA+NON-PROBE: NOT DETECTED
NOROVIRUS GI+II RNA STL QL NAA+NON-PROBE: NOT DETECTED
P SHIGELLOIDES DNA STL QL NAA+NON-PROBE: NOT DETECTED
RVA RNA STL QL NAA+NON-PROBE: NOT DETECTED
S ENT+BONG DNA STL QL NAA+NON-PROBE: NOT DETECTED
SAPO I+II+IV+V RNA STL QL NAA+NON-PROBE: NOT DETECTED
SHIGELLA SP+EIEC IPAH ST NAA+NON-PROBE: NOT DETECTED
V CHOL+PARA+VUL DNA STL QL NAA+NON-PROBE: NOT DETECTED
V CHOLERAE DNA STL QL NAA+NON-PROBE: NOT DETECTED
Y ENTEROCOL DNA STL QL NAA+NON-PROBE: NOT DETECTED

## 2021-10-29 PROCEDURE — 87493 C DIFF AMPLIFIED PROBE: CPT

## 2021-10-29 PROCEDURE — 0097U HC BIOFIRE FILMARRAY GI PANEL: CPT

## 2021-11-02 ENCOUNTER — TRANSCRIBE ORDERS (OUTPATIENT)
Dept: ADMINISTRATIVE | Facility: HOSPITAL | Age: 76
End: 2021-11-02

## 2021-11-03 ENCOUNTER — HOME CARE VISIT (OUTPATIENT)
Dept: HOME HEALTH SERVICES | Facility: CLINIC | Age: 76
End: 2021-11-03

## 2021-11-03 PROCEDURE — G0495 RN CARE TRAIN/EDU IN HH: HCPCS

## 2021-11-07 VITALS
SYSTOLIC BLOOD PRESSURE: 112 MMHG | HEART RATE: 87 BPM | OXYGEN SATURATION: 96 % | RESPIRATION RATE: 18 BRPM | DIASTOLIC BLOOD PRESSURE: 60 MMHG | TEMPERATURE: 97.9 F

## 2022-05-16 ENCOUNTER — TRANSCRIBE ORDERS (OUTPATIENT)
Dept: ADMINISTRATIVE | Facility: HOSPITAL | Age: 77
End: 2022-05-16

## 2022-05-16 ENCOUNTER — LAB (OUTPATIENT)
Dept: LAB | Facility: HOSPITAL | Age: 77
End: 2022-05-16

## 2022-05-16 DIAGNOSIS — I10 ESSENTIAL (PRIMARY) HYPERTENSION: ICD-10-CM

## 2022-05-16 DIAGNOSIS — E11.69 DIABETES MELLITUS ASSOCIATED WITH HORMONAL ETIOLOGY: Primary | ICD-10-CM

## 2022-05-16 DIAGNOSIS — E11.69 DIABETES MELLITUS ASSOCIATED WITH HORMONAL ETIOLOGY: ICD-10-CM

## 2022-05-16 DIAGNOSIS — K21.9 GASTRIC REFLUX: ICD-10-CM

## 2022-05-16 LAB
ALBUMIN SERPL-MCNC: 4.4 G/DL (ref 3.5–5.2)
ALBUMIN UR-MCNC: <1.2 MG/DL
ALBUMIN/GLOB SERPL: 1.4 G/DL
ALP SERPL-CCNC: 106 U/L (ref 39–117)
ALT SERPL W P-5'-P-CCNC: 23 U/L (ref 1–33)
ANION GAP SERPL CALCULATED.3IONS-SCNC: 12 MMOL/L (ref 5–15)
AST SERPL-CCNC: 21 U/L (ref 1–32)
BASOPHILS # BLD AUTO: 0.11 10*3/MM3 (ref 0–0.2)
BASOPHILS NFR BLD AUTO: 0.9 % (ref 0–1.5)
BILIRUB SERPL-MCNC: 0.7 MG/DL (ref 0–1.2)
BILIRUB UR QL STRIP: NEGATIVE
BUN SERPL-MCNC: 10 MG/DL (ref 8–23)
BUN/CREAT SERPL: 14.7 (ref 7–25)
CALCIUM SPEC-SCNC: 9.9 MG/DL (ref 8.6–10.5)
CHLORIDE SERPL-SCNC: 103 MMOL/L (ref 98–107)
CHOLEST SERPL-MCNC: 195 MG/DL (ref 0–200)
CLARITY UR: CLEAR
CO2 SERPL-SCNC: 26 MMOL/L (ref 22–29)
COLOR UR: YELLOW
CREAT SERPL-MCNC: 0.68 MG/DL (ref 0.57–1)
DEPRECATED RDW RBC AUTO: 42.8 FL (ref 37–54)
EGFRCR SERPLBLD CKD-EPI 2021: 91.5 ML/MIN/1.73
EOSINOPHIL # BLD AUTO: 0.6 10*3/MM3 (ref 0–0.4)
EOSINOPHIL NFR BLD AUTO: 5.1 % (ref 0.3–6.2)
ERYTHROCYTE [DISTWIDTH] IN BLOOD BY AUTOMATED COUNT: 13.2 % (ref 12.3–15.4)
GLOBULIN UR ELPH-MCNC: 3.1 GM/DL
GLUCOSE SERPL-MCNC: 187 MG/DL (ref 65–99)
GLUCOSE UR STRIP-MCNC: NEGATIVE MG/DL
HBA1C MFR BLD: 7.8 % (ref 4.8–5.6)
HCT VFR BLD AUTO: 43.4 % (ref 34–46.6)
HDLC SERPL-MCNC: 39 MG/DL (ref 40–60)
HGB BLD-MCNC: 14.1 G/DL (ref 12–15.9)
HGB UR QL STRIP.AUTO: NEGATIVE
IMM GRANULOCYTES # BLD AUTO: 0.04 10*3/MM3 (ref 0–0.05)
IMM GRANULOCYTES NFR BLD AUTO: 0.3 % (ref 0–0.5)
KETONES UR QL STRIP: NEGATIVE
LDLC SERPL CALC-MCNC: 107 MG/DL (ref 0–100)
LDLC/HDLC SERPL: 2.52 {RATIO}
LEUKOCYTE ESTERASE UR QL STRIP.AUTO: NEGATIVE
LYMPHOCYTES # BLD AUTO: 3.98 10*3/MM3 (ref 0.7–3.1)
LYMPHOCYTES NFR BLD AUTO: 33.8 % (ref 19.6–45.3)
MCH RBC QN AUTO: 28.9 PG (ref 26.6–33)
MCHC RBC AUTO-ENTMCNC: 32.5 G/DL (ref 31.5–35.7)
MCV RBC AUTO: 88.9 FL (ref 79–97)
MONOCYTES # BLD AUTO: 0.72 10*3/MM3 (ref 0.1–0.9)
MONOCYTES NFR BLD AUTO: 6.1 % (ref 5–12)
NEUTROPHILS NFR BLD AUTO: 53.8 % (ref 42.7–76)
NEUTROPHILS NFR BLD AUTO: 6.32 10*3/MM3 (ref 1.7–7)
NITRITE UR QL STRIP: NEGATIVE
NRBC BLD AUTO-RTO: 0 /100 WBC (ref 0–0.2)
PH UR STRIP.AUTO: 6 [PH] (ref 5–8)
PLATELET # BLD AUTO: 261 10*3/MM3 (ref 140–450)
PMV BLD AUTO: 11.9 FL (ref 6–12)
POTASSIUM SERPL-SCNC: 4.1 MMOL/L (ref 3.5–5.2)
PROT SERPL-MCNC: 7.5 G/DL (ref 6–8.5)
PROT UR QL STRIP: NEGATIVE
RBC # BLD AUTO: 4.88 10*6/MM3 (ref 3.77–5.28)
SODIUM SERPL-SCNC: 141 MMOL/L (ref 136–145)
SP GR UR STRIP: 1.01 (ref 1–1.03)
T4 FREE SERPL-MCNC: 1.29 NG/DL (ref 0.93–1.7)
TRIGL SERPL-MCNC: 288 MG/DL (ref 0–150)
TSH SERPL DL<=0.05 MIU/L-ACNC: 3.62 UIU/ML (ref 0.27–4.2)
UROBILINOGEN UR QL STRIP: NORMAL
VLDLC SERPL-MCNC: 49 MG/DL (ref 5–40)
WBC NRBC COR # BLD: 11.77 10*3/MM3 (ref 3.4–10.8)

## 2022-05-16 PROCEDURE — 81003 URINALYSIS AUTO W/O SCOPE: CPT

## 2022-05-16 PROCEDURE — 82043 UR ALBUMIN QUANTITATIVE: CPT

## 2022-05-16 PROCEDURE — 86769 SARS-COV-2 COVID-19 ANTIBODY: CPT

## 2022-05-16 PROCEDURE — 85025 COMPLETE CBC W/AUTO DIFF WBC: CPT

## 2022-05-16 PROCEDURE — 83036 HEMOGLOBIN GLYCOSYLATED A1C: CPT

## 2022-05-16 PROCEDURE — 80053 COMPREHEN METABOLIC PANEL: CPT

## 2022-05-16 PROCEDURE — 36415 COLL VENOUS BLD VENIPUNCTURE: CPT

## 2022-05-16 PROCEDURE — 80061 LIPID PANEL: CPT

## 2022-05-16 PROCEDURE — 84439 ASSAY OF FREE THYROXINE: CPT

## 2022-05-16 PROCEDURE — 84443 ASSAY THYROID STIM HORMONE: CPT

## 2022-05-17 LAB
SARS-COV-2 AB SERPL IA-ACNC: 731 U/ML
SARS-COV-2 AB SERPL IA-ACNC: NORMAL U/ML
SARS-COV-2 AB SERPL QL IA: POSITIVE
SARS-COV-2 AB SERPL-IMP: POSITIVE

## 2023-03-09 ENCOUNTER — TRANSCRIBE ORDERS (OUTPATIENT)
Dept: ADMINISTRATIVE | Facility: HOSPITAL | Age: 78
End: 2023-03-09
Payer: MEDICARE

## 2023-03-09 ENCOUNTER — LAB (OUTPATIENT)
Dept: LAB | Facility: HOSPITAL | Age: 78
End: 2023-03-09
Payer: MEDICARE

## 2023-03-09 DIAGNOSIS — E11.69 TYPE 2 DIABETES MELLITUS WITH OTHER SPECIFIED COMPLICATION, UNSPECIFIED WHETHER LONG TERM INSULIN USE: ICD-10-CM

## 2023-03-09 DIAGNOSIS — I10 ESSENTIAL HYPERTENSION: ICD-10-CM

## 2023-03-09 DIAGNOSIS — E11.69 TYPE 2 DIABETES MELLITUS WITH OTHER SPECIFIED COMPLICATION, UNSPECIFIED WHETHER LONG TERM INSULIN USE: Primary | ICD-10-CM

## 2023-03-09 LAB
BASOPHILS # BLD AUTO: 0.14 10*3/MM3 (ref 0–0.2)
BASOPHILS NFR BLD AUTO: 0.7 % (ref 0–1.5)
DEPRECATED RDW RBC AUTO: 39.9 FL (ref 37–54)
EOSINOPHIL # BLD AUTO: 0.2 10*3/MM3 (ref 0–0.4)
EOSINOPHIL NFR BLD AUTO: 1 % (ref 0.3–6.2)
ERYTHROCYTE [DISTWIDTH] IN BLOOD BY AUTOMATED COUNT: 12.1 % (ref 12.3–15.4)
HCT VFR BLD AUTO: 41.2 % (ref 34–46.6)
HGB BLD-MCNC: 13.1 G/DL (ref 12–15.9)
IMM GRANULOCYTES # BLD AUTO: 0.13 10*3/MM3 (ref 0–0.05)
IMM GRANULOCYTES NFR BLD AUTO: 0.6 % (ref 0–0.5)
LYMPHOCYTES # BLD AUTO: 5.93 10*3/MM3 (ref 0.7–3.1)
LYMPHOCYTES NFR BLD AUTO: 28.9 % (ref 19.6–45.3)
MCH RBC QN AUTO: 28.4 PG (ref 26.6–33)
MCHC RBC AUTO-ENTMCNC: 31.8 G/DL (ref 31.5–35.7)
MCV RBC AUTO: 89.4 FL (ref 79–97)
MONOCYTES # BLD AUTO: 0.99 10*3/MM3 (ref 0.1–0.9)
MONOCYTES NFR BLD AUTO: 4.8 % (ref 5–12)
NEUTROPHILS NFR BLD AUTO: 13.13 10*3/MM3 (ref 1.7–7)
NEUTROPHILS NFR BLD AUTO: 64 % (ref 42.7–76)
NRBC BLD AUTO-RTO: 0 /100 WBC (ref 0–0.2)
PLATELET # BLD AUTO: 480 10*3/MM3 (ref 140–450)
PMV BLD AUTO: 11.1 FL (ref 6–12)
RBC # BLD AUTO: 4.61 10*6/MM3 (ref 3.77–5.28)
WBC NRBC COR # BLD: 20.52 10*3/MM3 (ref 3.4–10.8)

## 2023-03-09 PROCEDURE — 85025 COMPLETE CBC W/AUTO DIFF WBC: CPT

## 2023-03-09 PROCEDURE — 36415 COLL VENOUS BLD VENIPUNCTURE: CPT

## 2023-04-18 ENCOUNTER — HOSPITAL ENCOUNTER (OUTPATIENT)
Dept: PAIN MANAGEMENT | Age: 78
Discharge: HOME | End: 2023-04-18
Payer: MEDICARE

## 2023-04-18 VITALS
DIASTOLIC BLOOD PRESSURE: 74 MMHG | SYSTOLIC BLOOD PRESSURE: 162 MMHG | OXYGEN SATURATION: 100 % | RESPIRATION RATE: 20 BRPM | HEART RATE: 102 BPM | TEMPERATURE: 97.8 F

## 2023-04-18 DIAGNOSIS — R52 PAIN MANAGEMENT: ICD-10-CM

## 2023-04-18 PROCEDURE — 2580000003 HC RX 258

## 2023-04-18 PROCEDURE — 2500000003 HC RX 250 WO HCPCS

## 2023-04-18 PROCEDURE — 6360000002 HC RX W HCPCS

## 2023-04-18 PROCEDURE — A4216 STERILE WATER/SALINE, 10 ML: HCPCS

## 2023-04-18 PROCEDURE — 62321 NJX INTERLAMINAR CRV/THRC: CPT

## 2023-04-18 RX ORDER — LIDOCAINE HYDROCHLORIDE 10 MG/ML
5 INJECTION, SOLUTION EPIDURAL; INFILTRATION; INTRACAUDAL; PERINEURAL ONCE
Status: DISCONTINUED | OUTPATIENT
Start: 2023-04-18 | End: 2023-04-20 | Stop reason: HOSPADM

## 2023-04-18 RX ORDER — METHYLPREDNISOLONE ACETATE 40 MG/ML
40 INJECTION, SUSPENSION INTRA-ARTICULAR; INTRALESIONAL; INTRAMUSCULAR; SOFT TISSUE ONCE
Status: DISCONTINUED | OUTPATIENT
Start: 2023-04-18 | End: 2023-04-20 | Stop reason: HOSPADM

## 2023-04-18 RX ORDER — SODIUM CHLORIDE 9 MG/ML
5 INJECTION INTRAVENOUS ONCE
Status: DISCONTINUED | OUTPATIENT
Start: 2023-04-18 | End: 2023-04-20 | Stop reason: HOSPADM

## 2023-04-18 ASSESSMENT — PAIN DESCRIPTION - LOCATION: LOCATION: BACK;ARM;NECK

## 2023-04-18 ASSESSMENT — PAIN SCALES - GENERAL: PAINLEVEL_OUTOF10: 10

## 2023-04-18 ASSESSMENT — PAIN DESCRIPTION - ORIENTATION: ORIENTATION: MID

## 2023-04-18 ASSESSMENT — PAIN DESCRIPTION - DESCRIPTORS
DESCRIPTORS: DISCOMFORT;SHARP
DESCRIPTORS: ACHING;SHARP;SHOOTING;NUMBNESS;TINGLING

## 2023-04-18 ASSESSMENT — PAIN - FUNCTIONAL ASSESSMENT
PAIN_FUNCTIONAL_ASSESSMENT: PREVENTS OR INTERFERES SOME ACTIVE ACTIVITIES AND ADLS
PAIN_FUNCTIONAL_ASSESSMENT: NONE - DENIES PAIN
PAIN_FUNCTIONAL_ASSESSMENT: PREVENTS OR INTERFERES SOME ACTIVE ACTIVITIES AND ADLS

## 2023-04-18 ASSESSMENT — PAIN DESCRIPTION - FREQUENCY: FREQUENCY: CONTINUOUS

## 2023-04-18 ASSESSMENT — PAIN DESCRIPTION - PAIN TYPE: TYPE: CHRONIC PAIN

## 2023-04-18 NOTE — PROGRESS NOTES
Procedure:  Level of Consciousness: [x]Alert [x]Oriented []Disoriented []Lethargic  Anxiety Level: [x]Calm []Anxious []Depressed []Other  Skin: [x]Warm [x]Dry []Cool []Moist []Intact []Other  Cardiovascular: [x]Palpitations: [x]Never []Occasionally []Frequently  Chest Pain: [x]No []Yes  Respiratory:  [x]Unlabored []Labored []Cough ([] Productive []Unproductive)  HCG Required: []No []Yes   Results: []Negative []Positive  Knowledge Level:        [x]Patient/Other verbalized understanding of pre-procedure instructions. [x]Assessment of post-op care needs (transportation, responsible caregiver)        [x]Able to discuss health care problems and how to deal with it.   Factors that Affect Teaching:        Language Barrier: [x]No []Yes - why:        Hearing Loss:        [x]No []Yes            Corrective Device:  []Yes [x]No        Vision Loss:           [x]No []Yes            Corrective Device:  []Yes []No        Memory Loss:       [x]No []Yes            []Short Term []Long Term  Motivational Level:  [x]Asks Questions                  []Extremely Anxious       [x]Seems Interested               []Seems Uninterested                  [x]Denies need for Education  Risk for Injury:  [x]Patient oriented to person, place and time  []History of frequent falls/loss of balance  Nutritional:  []Change in appetite   []Weight Gain   []Weight Loss  Functional:  []Requires assistance with ADL's

## 2023-04-18 NOTE — INTERVAL H&P NOTE
Update History & Physical    The patient's History and Physical  was reviewed with the patient and I examined the patient. There was no change. The surgical site was confirmed by the patient and me. Plan: The risks, benefits, expected outcome, and alternative to the recommended procedure have been discussed with the patient. Patient understands and wants to proceed with the procedure.      Electronically signed by Greg Villaseñor MD on 4/18/2023 at 10:13 AM

## 2023-05-18 ENCOUNTER — LAB (OUTPATIENT)
Dept: LAB | Facility: HOSPITAL | Age: 78
End: 2023-05-18
Payer: MEDICARE

## 2023-05-18 ENCOUNTER — TRANSCRIBE ORDERS (OUTPATIENT)
Dept: ADMINISTRATIVE | Facility: HOSPITAL | Age: 78
End: 2023-05-18
Payer: MEDICARE

## 2023-05-18 DIAGNOSIS — E11.69 TYPE 2 DIABETES MELLITUS WITH OTHER SPECIFIED COMPLICATION, UNSPECIFIED WHETHER LONG TERM INSULIN USE: ICD-10-CM

## 2023-05-18 DIAGNOSIS — I10 ESSENTIAL (PRIMARY) HYPERTENSION: Primary | ICD-10-CM

## 2023-05-18 DIAGNOSIS — I10 ESSENTIAL (PRIMARY) HYPERTENSION: ICD-10-CM

## 2023-05-18 LAB
ALBUMIN SERPL-MCNC: 3.9 G/DL (ref 3.5–5.2)
ALBUMIN/GLOB SERPL: 1.1 G/DL
ALP SERPL-CCNC: 104 U/L (ref 39–117)
ALT SERPL W P-5'-P-CCNC: 27 U/L (ref 1–33)
ANION GAP SERPL CALCULATED.3IONS-SCNC: 15.6 MMOL/L (ref 5–15)
AST SERPL-CCNC: 21 U/L (ref 1–32)
BASOPHILS # BLD AUTO: 0.1 10*3/MM3 (ref 0–0.2)
BASOPHILS NFR BLD AUTO: 0.5 % (ref 0–1.5)
BILIRUB SERPL-MCNC: 0.4 MG/DL (ref 0–1.2)
BILIRUB UR QL STRIP: NEGATIVE
BUN SERPL-MCNC: 11 MG/DL (ref 8–23)
BUN/CREAT SERPL: 15.7 (ref 7–25)
CALCIUM SPEC-SCNC: 10.7 MG/DL (ref 8.6–10.5)
CHLORIDE SERPL-SCNC: 102 MMOL/L (ref 98–107)
CHOLEST SERPL-MCNC: 158 MG/DL (ref 0–200)
CLARITY UR: CLEAR
CO2 SERPL-SCNC: 21.4 MMOL/L (ref 22–29)
COLOR UR: YELLOW
CREAT SERPL-MCNC: 0.7 MG/DL (ref 0.57–1)
DEPRECATED RDW RBC AUTO: 35.9 FL (ref 37–54)
EGFRCR SERPLBLD CKD-EPI 2021: 90.3 ML/MIN/1.73
EOSINOPHIL # BLD AUTO: 0.15 10*3/MM3 (ref 0–0.4)
EOSINOPHIL NFR BLD AUTO: 0.8 % (ref 0.3–6.2)
ERYTHROCYTE [DISTWIDTH] IN BLOOD BY AUTOMATED COUNT: 11.7 % (ref 12.3–15.4)
GLOBULIN UR ELPH-MCNC: 3.6 GM/DL
GLUCOSE SERPL-MCNC: 134 MG/DL (ref 65–99)
GLUCOSE UR STRIP-MCNC: NEGATIVE MG/DL
HBA1C MFR BLD: 8.4 % (ref 4.8–5.6)
HCT VFR BLD AUTO: 38.5 % (ref 34–46.6)
HDLC SERPL-MCNC: 41 MG/DL (ref 40–60)
HGB BLD-MCNC: 12.6 G/DL (ref 12–15.9)
HGB UR QL STRIP.AUTO: NEGATIVE
IMM GRANULOCYTES # BLD AUTO: 0.11 10*3/MM3 (ref 0–0.05)
IMM GRANULOCYTES NFR BLD AUTO: 0.6 % (ref 0–0.5)
KETONES UR QL STRIP: NEGATIVE
LDLC SERPL CALC-MCNC: 93 MG/DL (ref 0–100)
LDLC/HDLC SERPL: 2.21 {RATIO}
LEUKOCYTE ESTERASE UR QL STRIP.AUTO: NEGATIVE
LYMPHOCYTES # BLD AUTO: 3.31 10*3/MM3 (ref 0.7–3.1)
LYMPHOCYTES NFR BLD AUTO: 16.8 % (ref 19.6–45.3)
MCH RBC QN AUTO: 27.7 PG (ref 26.6–33)
MCHC RBC AUTO-ENTMCNC: 32.7 G/DL (ref 31.5–35.7)
MCV RBC AUTO: 84.6 FL (ref 79–97)
MONOCYTES # BLD AUTO: 1.01 10*3/MM3 (ref 0.1–0.9)
MONOCYTES NFR BLD AUTO: 5.1 % (ref 5–12)
NEUTROPHILS NFR BLD AUTO: 15.02 10*3/MM3 (ref 1.7–7)
NEUTROPHILS NFR BLD AUTO: 76.2 % (ref 42.7–76)
NITRITE UR QL STRIP: NEGATIVE
NRBC BLD AUTO-RTO: 0 /100 WBC (ref 0–0.2)
PH UR STRIP.AUTO: 6.5 [PH] (ref 5–8)
PLATELET # BLD AUTO: 554 10*3/MM3 (ref 140–450)
PMV BLD AUTO: 10.8 FL (ref 6–12)
POTASSIUM SERPL-SCNC: 4.1 MMOL/L (ref 3.5–5.2)
PROT SERPL-MCNC: 7.5 G/DL (ref 6–8.5)
PROT UR QL STRIP: NEGATIVE
RBC # BLD AUTO: 4.55 10*6/MM3 (ref 3.77–5.28)
SODIUM SERPL-SCNC: 139 MMOL/L (ref 136–145)
SP GR UR STRIP: 1.02 (ref 1–1.03)
T4 FREE SERPL-MCNC: 1.25 NG/DL (ref 0.93–1.7)
TRIGL SERPL-MCNC: 132 MG/DL (ref 0–150)
TSH SERPL DL<=0.05 MIU/L-ACNC: 1.75 UIU/ML (ref 0.27–4.2)
UROBILINOGEN UR QL STRIP: NORMAL
VLDLC SERPL-MCNC: 24 MG/DL (ref 5–40)
WBC NRBC COR # BLD: 19.7 10*3/MM3 (ref 3.4–10.8)

## 2023-05-18 PROCEDURE — 85025 COMPLETE CBC W/AUTO DIFF WBC: CPT

## 2023-05-18 PROCEDURE — 84439 ASSAY OF FREE THYROXINE: CPT

## 2023-05-18 PROCEDURE — 80053 COMPREHEN METABOLIC PANEL: CPT

## 2023-05-18 PROCEDURE — 84443 ASSAY THYROID STIM HORMONE: CPT

## 2023-05-18 PROCEDURE — 81003 URINALYSIS AUTO W/O SCOPE: CPT

## 2023-05-18 PROCEDURE — 80061 LIPID PANEL: CPT

## 2023-05-18 PROCEDURE — 36415 COLL VENOUS BLD VENIPUNCTURE: CPT

## 2023-05-18 PROCEDURE — 83036 HEMOGLOBIN GLYCOSYLATED A1C: CPT

## 2023-06-05 ENCOUNTER — TRANSCRIBE ORDERS (OUTPATIENT)
Dept: ADMINISTRATIVE | Facility: HOSPITAL | Age: 78
End: 2023-06-05
Payer: MEDICARE

## 2023-06-05 ENCOUNTER — LAB (OUTPATIENT)
Dept: LAB | Facility: HOSPITAL | Age: 78
End: 2023-06-05
Payer: MEDICARE

## 2023-06-05 DIAGNOSIS — M79.641 PAIN IN BOTH HANDS: ICD-10-CM

## 2023-06-05 DIAGNOSIS — M79.642 PAIN IN BOTH HANDS: Primary | ICD-10-CM

## 2023-06-05 DIAGNOSIS — M79.642 PAIN IN BOTH HANDS: ICD-10-CM

## 2023-06-05 DIAGNOSIS — M79.641 PAIN IN BOTH HANDS: Primary | ICD-10-CM

## 2023-06-05 PROCEDURE — 85598 HEXAGNAL PHOSPH PLTLT NEUTRL: CPT

## 2023-06-05 PROCEDURE — 85613 RUSSELL VIPER VENOM DILUTED: CPT

## 2023-06-05 PROCEDURE — 85730 THROMBOPLASTIN TIME PARTIAL: CPT

## 2023-06-05 PROCEDURE — 85652 RBC SED RATE AUTOMATED: CPT

## 2023-06-05 PROCEDURE — 86431 RHEUMATOID FACTOR QUANT: CPT

## 2023-06-05 PROCEDURE — 86038 ANTINUCLEAR ANTIBODIES: CPT

## 2023-06-05 PROCEDURE — 86146 BETA-2 GLYCOPROTEIN ANTIBODY: CPT

## 2023-06-05 PROCEDURE — 85610 PROTHROMBIN TIME: CPT

## 2023-06-05 PROCEDURE — 86140 C-REACTIVE PROTEIN: CPT

## 2023-06-05 PROCEDURE — 85670 THROMBIN TIME PLASMA: CPT

## 2023-06-05 PROCEDURE — 85732 THROMBOPLASTIN TIME PARTIAL: CPT

## 2023-06-05 PROCEDURE — 36415 COLL VENOUS BLD VENIPUNCTURE: CPT

## 2023-06-05 PROCEDURE — 86147 CARDIOLIPIN ANTIBODY EA IG: CPT

## 2023-06-05 PROCEDURE — 85597 PHOSPHOLIPID PLTLT NEUTRALIZ: CPT

## 2023-06-06 LAB
CHROMATIN AB SERPL-ACNC: <10 IU/ML (ref 0–14)
CRP SERPL-MCNC: 4.21 MG/DL (ref 0–0.5)
ERYTHROCYTE [SEDIMENTATION RATE] IN BLOOD: 45 MM/HR (ref 0–30)

## 2023-06-08 LAB
ANA SER QL IF: NEGATIVE
LABORATORY COMMENT REPORT: NORMAL

## 2023-07-12 NOTE — TELEPHONE ENCOUNTER
After discussing with Jessica she can be put on her schedule this week for dysuria . She doesn't need any imaging prior to appt.  
Pt has pain when urinating and burns afterwards.  Urinating every 2 hours.      She would like to come in to be seen.    Will she need an ultrasound or anything before her ov?    Thanks in advance,    Duncan   
Pt just recently had urine culture done which showed no infection. Pt is on a BCG therapy plan. She can take AZO for the dysuria.   
yes

## 2023-07-13 ENCOUNTER — TELEPHONE (OUTPATIENT)
Dept: SURGERY | Facility: CLINIC | Age: 78
End: 2023-07-13

## 2023-07-13 NOTE — TELEPHONE ENCOUNTER
Caller: Pam Clayton    Relationship: Self    Best call back number: [phone]    Who are you requesting to speak with (clinical staff, provider,  specific staff member): CLINICAL    What was the call regarding: PT CALLING TO SEE IF THE RESULTS OF THEIR BREAST BIOPSY DONE ON: 7/7 ARE COMPLETE -  ATTEMPTED TO TRANSFER; NO ANSWER AT PRACTICE

## 2023-07-18 PROBLEM — C50.912 MALIGNANT NEOPLASM OF LEFT BREAST: Status: ACTIVE | Noted: 2023-07-18

## 2023-07-18 NOTE — H&P (VIEW-ONLY)
Patient: Pam Clayton    YOB: 1945    Date: 07/18/2023    Primary Care Provider: Hung Nicole MD    Chief Complaint   Patient presents with    Follow-up     Mrs. Clayton is here for a f/u after a breast biopsy.        Subjective .     History of present illness:   She is here for yearly follow-up    The following portions of the patient's history were reviewed and updated as appropriate: allergies, current medications, past family history, past medical history, past social history, past surgical history and problem list.    History:  Past Medical History:   Diagnosis Date    Arthritis     Bladder cancer     Bladder cancer    Diabetes mellitus     GERD (gastroesophageal reflux disease)     Hard of hearing     Hx of excision of tumor of brain meninges     Hypertension     UTI (urinary tract infection)           Past Surgical History:   Procedure Laterality Date    APPENDECTOMY      BRAIN TUMOR EXCISION      CHOLECYSTECTOMY      CYSTOSCOPY BLADDER BIOPSY N/A 7/25/2018    Procedure: CYSTOSCOPY BILATERAL RETROGRADE PYELOGRAM BLADDER BIOPSIES WITH FULGURATION;  Surgeon: Rj Tijerina MD;  Location: [facility] OR;  Service: Urology    CYSTOSCOPY BLADDER BIOPSY N/A 10/31/2018    Procedure: CYSTOSCOPY BLADDER BIOPSY WITH FULGURATION;  Surgeon: Rj Tijerina MD;  Location: [facility] OR;  Service: Urology    HYSTERECTOMY      TOTAL KNEE ARTHROPLASTY Bilateral        Family History   Problem Relation Age of Onset    No Known Problems Father     Cancer Mother     Diabetes Mother     Heart disease Mother        Social History     Tobacco Use    Smoking status: Never    Smokeless tobacco: Never   Vaping Use    Vaping Use: Never used   Substance Use Topics    Alcohol use: No    Drug use: No       Allergies:  Allergies   Allergen Reactions    Halothane Anaphylaxis and Unknown (See Comments)     STATES MOTHER HAD ANAPHYLAXIS REACTION TO THIS    Atorvastatin Myalgia    Cortisone Other (See Comments)     Lisinopril Unknown (See Comments)     DOES NOT REMEMBER    Metformin Diarrhea    Mometasone Furoate Unknown (See Comments)     DOES NOT REMEMBER    Sulfa Antibiotics Unknown (See Comments)     PT STATES SHE DOESN'T REMEMBER WHAT THIS MED DID       Tramadol Delirium    Codeine Anxiety     STATES SHE GETS A HEADACHE      Cortisol Manager [Cholestatin] Anxiety    Dexlansoprazole Diarrhea    Levaquin [Levofloxacin] Confusion     made her sick and loopy    Phenytoin Sodium Extended Itching       Medications:     Current Outpatient Medications:     albuterol sulfate  (90 Base) MCG/ACT inhaler, Inhale 1 puff Every 4 (Four) Hours As Needed for Shortness of Air or Wheezing., Disp: , Rfl:     Cetirizine HCl 10 MG capsule, Take 1 tablet by mouth Daily., Disp: , Rfl:     Coenzyme Q10 10 MG capsule, Take 400 mg by mouth Daily., Disp: , Rfl:     esomeprazole (nexIUM) 40 MG capsule, Take 1 capsule by mouth Every Morning Before Breakfast., Disp: , Rfl:     glipiZIDE (GLUCOTROL XL) 5 MG ER tablet, Take 1 tablet by mouth Daily., Disp: , Rfl: 2    insulin detemir (LEVEMIR) 100 UNIT/ML injection, Inject 36 Units under the skin into the appropriate area as directed Every Night., Disp: , Rfl:     Lactobacillus-Inulin ([facility]) capsule, Take 1 tablet by mouth Daily., Disp: , Rfl:     losartan (COZAAR) 50 MG tablet, Take 1 tablet by mouth Daily., Disp: , Rfl: 1    metFORMIN ER (GLUCOPHAGE-XR) 500 MG 24 hr tablet, Take 1 tablet by mouth Daily., Disp: 30 tablet, Rfl: 3    metoprolol succinate XL (TOPROL-XL) 100 MG 24 hr tablet, Take 50 mg by mouth Every Night., Disp: , Rfl:     MULTIPLE VITAMIN PO, Take 1 tablet by mouth Daily., Disp: , Rfl:     rivaroxaban (Xarelto) 10 MG tablet, Take 1 tablet by mouth Daily., Disp: 14 tablet, Rfl: 0    Current Facility-Administered Medications:     lidocaine (XYLOCAINE) 1 % injection 10 mL, 10 mL, Subcutaneous, Once, Sarai Gauthier MD    lidocaine 1% - EPINEPHrine 1:[id number]  "(XYLOCAINE W/EPI) 1 %-1:903850 injection 10 mL, 10 mL, Injection, Once, Sarai Gauthier MD    Vital Signs:   Vitals:    07/18/23 0851   Weight: 66.2 kg (145 lb 15.1 oz)   Height: 167.6 cm (65.98\")       I reviewed the patient's pathology report with her.  My partner Dr. Anderson spoke with her last week regarding the report I reviewed her notes.  I agree with her assessment.  Patient would benefit from an MRI but she is unable to have that done.  We are still awaiting the tumor markers.  I discussed the tumor markers impact on neoadjuvant chemotherapy or proceeding with surgery.  We discussed surgical options including lumpectomy and radiation versus mastectomy.  After lengthy discussion the patient desires a mastectomy.  We discussed the need for sentinel lymph node biopsy and possible further surgery.  She is going to talk with her son because she will need to coordinate care for her ailing .  In addition we need to wait for final tumor markers.  Dr. Holland is her family's oncologist and she wants to stay with him.  He has stated that he will take care of her.    Results Review:   I reviewed the patient's new clinical results.        Assessment / Plan:    Diagnoses and all orders for this visit:    1. Mass of left breast, unspecified quadrant (Primary)  -     Case Request; Standing  -     Comprehensive Metabolic Panel; Future  -     lactated ringers infusion  -     ceFAZolin (ANCEF) 2,000 mg in sodium chloride 0.9 % 100 mL IVPB  -     acetaminophen (TYLENOL) tablet 975 mg  -     heparin (porcine) 5000 UNIT/ML injection 5,000 Units  -     celecoxib (CeleBREX) capsule 200 mg  -     Case Request    Other orders  -     Follow Anesthesia Guidelines / Protocol; Future  -     Follow Anesthesia Guidelines / Protocol; Standing  -     Provide Instructions to Patient on NPO Status; Future  -     Obtain Informed Consent; Standing  -     Place Sequential Compression Device in Pre-Op; Standing  -     Do Not Place IV " or Blood Pressure Cuff on Affected Side of Patient With History of Breast Cancer; Standing  -     Contact Surgeon With Questions or Concerns; Standing  -     Verify / Perform Chlorhexidine Skin Prep; Standing  -     Verify NPO Status; Standing  -     XR Chest 1 View; Standing  -     Outpatient In A Bed; Standing        Plan-we will tentatively set her up for lumpectomy with sentinel lymph node biopsy but will wait to fully schedule after she talks with her son and we get the markers back      Electronically signed by Tonia Ledezma MD  07/18/23  11:47 CDT

## 2023-07-21 PROBLEM — N63.20 MASS OF LEFT BREAST: Status: ACTIVE | Noted: 2023-07-21

## 2023-07-27 ENCOUNTER — PRE-ADMISSION TESTING (OUTPATIENT)
Dept: PREADMISSION TESTING | Facility: HOSPITAL | Age: 78
End: 2023-07-27
Payer: MEDICARE

## 2023-07-27 ENCOUNTER — APPOINTMENT (OUTPATIENT)
Dept: ULTRASOUND IMAGING | Facility: HOSPITAL | Age: 78
End: 2023-07-27
Payer: MEDICARE

## 2023-07-27 ENCOUNTER — HOSPITAL ENCOUNTER (OUTPATIENT)
Dept: GENERAL RADIOLOGY | Facility: HOSPITAL | Age: 78
Discharge: HOME | End: 2023-07-27
Payer: MEDICARE

## 2023-07-27 VITALS
BODY MASS INDEX: 24.79 KG/M2 | RESPIRATION RATE: 18 BRPM | HEART RATE: 81 BPM | WEIGHT: 148.81 LBS | DIASTOLIC BLOOD PRESSURE: 71 MMHG | SYSTOLIC BLOOD PRESSURE: 142 MMHG | OXYGEN SATURATION: 96 % | HEIGHT: 65 IN

## 2023-07-27 LAB
ALBUMIN SERPL-MCNC: 4 G/DL (ref 3.5–5.2)
ALBUMIN/GLOB SERPL: 1.3 G/DL
ALP SERPL-CCNC: 98 U/L (ref 39–117)
ALT SERPL W P-5'-P-CCNC: 9 U/L (ref 1–33)
ANION GAP SERPL CALCULATED.3IONS-SCNC: 13 MMOL/L (ref 5–15)
AST SERPL-CCNC: 15 U/L (ref 1–32)
BILIRUB SERPL-MCNC: 0.3 MG/DL (ref 0–1.2)
BUN SERPL-MCNC: 11 MG/DL (ref 8–23)
BUN/CREAT SERPL: 19.6 (ref 7–25)
CALCIUM SPEC-SCNC: 9.8 MG/DL (ref 8.6–10.5)
CHLORIDE SERPL-SCNC: 106 MMOL/L (ref 98–107)
CO2 SERPL-SCNC: 25 MMOL/L (ref 22–29)
CREAT SERPL-MCNC: 0.56 MG/DL (ref 0.57–1)
DEPRECATED RDW RBC AUTO: 43.5 FL (ref 37–54)
EGFRCR SERPLBLD CKD-EPI 2021: 94.7 ML/MIN/1.73
ERYTHROCYTE [DISTWIDTH] IN BLOOD BY AUTOMATED COUNT: 13.6 % (ref 12.3–15.4)
GLOBULIN UR ELPH-MCNC: 3.1 GM/DL
GLUCOSE SERPL-MCNC: 161 MG/DL (ref 65–99)
HCT VFR BLD AUTO: 41 % (ref 34–46.6)
HGB BLD-MCNC: 12.4 G/DL (ref 12–15.9)
MCH RBC QN AUTO: 26.4 PG (ref 26.6–33)
MCHC RBC AUTO-ENTMCNC: 30.2 G/DL (ref 31.5–35.7)
MCV RBC AUTO: 87.4 FL (ref 79–97)
PLATELET # BLD AUTO: 380 10*3/MM3 (ref 140–450)
PMV BLD AUTO: 10.5 FL (ref 6–12)
POTASSIUM SERPL-SCNC: 4.5 MMOL/L (ref 3.5–5.2)
PROT SERPL-MCNC: 7.1 G/DL (ref 6–8.5)
RBC # BLD AUTO: 4.69 10*6/MM3 (ref 3.77–5.28)
SODIUM SERPL-SCNC: 144 MMOL/L (ref 136–145)
WBC NRBC COR # BLD: 12.72 10*3/MM3 (ref 3.4–10.8)

## 2023-07-27 PROCEDURE — 85027 COMPLETE CBC AUTOMATED: CPT

## 2023-07-27 PROCEDURE — 80053 COMPREHEN METABOLIC PANEL: CPT | Performed by: SPECIALIST

## 2023-07-27 PROCEDURE — 36415 COLL VENOUS BLD VENIPUNCTURE: CPT

## 2023-07-27 PROCEDURE — 71045 X-RAY EXAM CHEST 1 VIEW: CPT

## 2023-07-27 RX ORDER — PERPHENAZINE 4 MG
3 TABLET ORAL DAILY
COMMUNITY

## 2023-07-27 RX ORDER — SODIUM PHOSPHATE,MONO-DIBASIC 19G-7G/118
1 ENEMA (ML) RECTAL 2 TIMES DAILY
COMMUNITY

## 2023-07-27 RX ORDER — INSULIN GLARGINE 100 [IU]/ML
48 INJECTION, SOLUTION SUBCUTANEOUS DAILY
COMMUNITY

## 2023-07-27 NOTE — DISCHARGE INSTRUCTIONS
Before you come to the hospital        Arrival time: AS DIRECTED BY OFFICE     YOU MAY TAKE THE FOLLOWING MEDICATION(S) THE MORNING OF SURGERY WITH A SIP OF WATER: Metoprolol           ALL OTHER HOME MEDICATION CHECK WITH YOUR PHYSICIAN (especially if   you are taking diabetes medicines or blood thinners)    Do not take any Erectile Dysfunction medications (EX: CIALIS, VIAGRA) 24 hours prior to surgery.      If you were given and instructed to use a germ- killing soap, use as directed the night before surgery and again the morning of surgery or as directed by your surgeon. (Use one-half of the bottle with each shower.)   See attached information for How to Use Chlorhexidine for Bathing if applicable.            Eating and drinking restrictions prior to scheduled arrival time    2 Hours before arrival time STOP   Drinking Clear liquids (water, black coffee-NO CREAM,  apple juice-no pulp)      8 Hours before arrival time STOP   All food, full liquids, and dairy products    (It is extremely important that you follow these guidelines to prevent delay or cancelation of your procedure)     Clear Liquids  Water and flavored water                                                                      Clear Fruit juices, such as cranberry juice and apple juice.  Black coffee (NO cream of any kind, including powdered).  Plain tea  Clear bouillon or broth.  Flavored gelatin.  Soda.  Gatorade or Powerade.  Full liquid examples  Juices that have pulp.  Frozen ice pops that contain fruit pieces.  Coffee with creamer  Milk.  Yogurt.                MANAGING PAIN AFTER SURGERY    We know you are probably wondering what your pain will be like after surgery.  Following surgery it is unrealistic to expect you will not have pain.   Pain is how our bodies let us know that something is wrong or cautions us to be careful.  That said, our goal is to make your pain tolerable.    Methods we may use to treat your pain include (oral or IV  medications, PCAs, epidurals, nerve blocks, etc.)   While some procedures require IV pain medications for a short time after surgery, transitioning to pain medications by mouth allows for better management of pain.   Your nurse will encourage you to take oral pain medications whenever possible.  IV medications work almost immediately, but only last a short while.  Taking medications by mouth allows for a more constant level of medication in your blood stream for a longer period of time.      Once your pain is out of control it is harder to get back under control.  It is important you are aware when your next dose of pain medication is due.  If you are admitted, your nurse may write the time of your next dose on the white board in your room to help you remember.      We are interested in your pain and encourage you to inform us about aggravating factors during your visit.   Many times a simple repositioning every few hours can make a big difference.    If your physician says it is okay, do not let your pain prevent you from getting out of bed. Be sure to call your nurse for assistance prior to getting up so you do not fall.      Before surgery, please decide your tolerable pain goal.  These faces help describe the pain ratings we use on a 0-10 scale.   Be prepared to tell us your goal and whether or not you take pain or anxiety medications at home.          Preparing for Surgery  Preparing for surgery is an important part of your care. It can make things go more smoothly and help you avoid complications. The steps leading up to surgery may vary among hospitals. Follow all instructions given to you by your health care providers. Ask questions if you do not understand something. Talk about any concerns that you have.  Here are some questions to consider asking before your surgery:  If my surgery is not an emergency (is elective), when would be the best time to have the surgery?  What arrangements do I need to make for  work, home, or school?  What will my recovery be like? How long will it be before I can return to normal activities?  Will I need to prepare my home? Will I need to arrange care for me or my children?  Should I expect to have pain after surgery? What are my pain management options? Are there nonmedical options that I can try for pain?  Tell a health care provider about:  Any allergies you have.  All medicines you are taking, including vitamins, herbs, eye drops, creams, and over-the-counter medicines.  Any problems you or family members have had with anesthetic medicines.  Any blood disorders you have.  Any surgeries you have had.  Any medical conditions you have.  Whether you are pregnant or may be pregnant.  What are the risks?  The risks and complications of surgery depend on the specific procedure that you have. Discuss all the risks with your health care providers before your surgery. Ask about common surgical complications, which may include:  Infection.  Bleeding or a need for blood replacement (transfusion).  Allergic reactions to medicines.  Damage to surrounding nerves, tissues, or structures.  A blood clot.  Scarring.  Failure of the surgery to correct the problem.  Follow these instructions before the procedure:  Several days or weeks before your procedure  You may have a physical exam by your primary health care provider to make sure it is safe for you to have surgery.  You may have testing. This may include a chest X-ray, blood and urine tests, electrocardiogram (ECG), or other testing.  Ask your health care provider about:  Changing or stopping your regular medicines. This is especially important if you are taking diabetes medicines or blood thinners.  Taking medicines such as aspirin and ibuprofen. These medicines can thin your blood. Do not take these medicines unless your health care provider tells you to take them.  Taking over-the-counter medicines, vitamins, herbs, and supplements.  Do not use  any products that contain nicotine or tobacco, such as cigarettes and e-cigarettes. If you need help quitting, ask your health care provider.  Avoid alcohol.  Ask your health care provider if there are exercises you can do to prepare for surgery.  Eat a healthy diet.   Plan to have someone 18 years of age or older to take you home from the hospital. We will need to verify your ride on the morning of surgery if you are being discharged home on the same day. Tell your ride to be expecting a call from the hospital prior to your procedure.   Plan to have a responsible adult care for you for at least 24 hours after you leave the hospital or clinic. This is important.  The day before your procedure  You may be given antibiotic medicine to take by mouth to help prevent infection. Take it as told by your health care provider.  You may be asked to shower with a germ-killing soap.  Follow instructions from your health care provider about eating and drinking restrictions. This includes gum, mints and hard candy.  Pack comfortable clothes according to your procedure.   The day of your procedure  You may need to take another shower with a germ-killing soap before you leave home in the morning.  With a small sip of water, take only the medicines that you are told to take.  Remove all jewelry including rings.   Leave anything you consider valuable at home except hearing aids if needed.  You do not need to bring your home medications into the hospital.   Do not wear any makeup, nail polish, powder, deodorant, lotion, hair accessories, or anything on your skin or body except your clothes.  If you will be staying in the hospital, bring a case to hold your glasses, contacts, or dentures. You may also want to bring your robe and non-skid footwear.       (Do not use denture adhesives since you will be asked to remove them during  surgery).   If you wear oxygen at home, bring it with you the day of surgery.  If instructed by your health  care provider, bring your sleep apnea device with you on the day of your surgery (if this applies to you).  You may want to leave your suitcase and sleep apnea device in the car until after surgery.   Arrive at the hospital as scheduled.  Bring a friend or family member with you who can help to answer questions and be present while you meet with your health care provider.  At the hospital  When you arrive at the hospital:  Go to registration located at the main entrance of the hospital. You will be registered and given a beeper and a sticker sheet. Take the stickers to the Outpatient nurses desk and place in the black tray. This is to notify staff that you have arrived. Then return to the lobby to wait.   When your beeper lights up and vibrates proceed through the double doors, under the stairs, and a member of the Outpatient Surgery staff will escort you to your preoperative room.  You may have to wear compression sleeves. These help to prevent blood clots and reduce swelling in your legs.  An IV may be inserted into one of your veins.              In the operating room, you may be given one or more of the following:        A medicine to help you relax (sedative).        A medicine to numb the area (local anesthetic).        A medicine to make you fall asleep (general anesthetic).        A medicine that is injected into an area of your body to numb everything below the                      injection site (regional anesthetic).  You may be given an antibiotic through your IV to help prevent infection.  Your surgical site will be marked or identified.    Contact a health care provider if you:  Develop a fever of more than 100.4°F (38°C) or other feelings of illness during the 48 hours before your surgery.  Have symptoms that get worse.  Have questions or concerns about your surgery.  Summary  Preparing for surgery can make the procedure go more smoothly and lower your risk of complications.  Before surgery, make a  list of questions and concerns to discuss with your surgeon. Ask about the risks and possible complications.  In the days or weeks before your surgery, follow all instructions from your health care provider. You may need to stop smoking, avoid alcohol, follow eating restrictions, and change or stop your regular medicines.  Contact your surgeon if you develop a fever or other signs of illness during the few days before your surgery.  This information is not intended to replace advice given to you by your health care provider. Make sure you discuss any questions you have with your health care provider.  Document Revised: 12/21/2018 Document Reviewed: 10/23/2018  Elsevier Patient Education © 2021 [employer]

## 2023-07-31 ENCOUNTER — HOSPITAL ENCOUNTER (OUTPATIENT)
Dept: NUCLEAR MEDICINE | Facility: HOSPITAL | Age: 78
Discharge: HOME | End: 2023-07-31
Payer: MEDICARE

## 2023-08-01 ENCOUNTER — HOSPITAL ENCOUNTER (OUTPATIENT)
Dept: NUCLEAR MEDICINE | Facility: HOSPITAL | Age: 78
Discharge: HOME | End: 2023-08-01
Payer: MEDICARE

## 2023-08-01 ENCOUNTER — HOSPITAL ENCOUNTER (OUTPATIENT)
Facility: HOSPITAL | Age: 78
Setting detail: HOSPITAL OUTPATIENT SURGERY
Discharge: HOME | End: 2023-08-01
Attending: SPECIALIST | Admitting: SPECIALIST
Payer: MEDICARE

## 2023-08-01 ENCOUNTER — ANESTHESIA EVENT (OUTPATIENT)
Dept: PERIOP | Facility: HOSPITAL | Age: 78
End: 2023-08-01
Payer: MEDICARE

## 2023-08-01 ENCOUNTER — ANESTHESIA (OUTPATIENT)
Dept: PERIOP | Facility: HOSPITAL | Age: 78
End: 2023-08-01
Payer: MEDICARE

## 2023-08-01 VITALS
RESPIRATION RATE: 16 BRPM | HEART RATE: 101 BPM | SYSTOLIC BLOOD PRESSURE: 159 MMHG | OXYGEN SATURATION: 95 % | TEMPERATURE: 97.8 F | DIASTOLIC BLOOD PRESSURE: 84 MMHG

## 2023-08-01 DIAGNOSIS — N63.0 BREAST MASS: ICD-10-CM

## 2023-08-01 DIAGNOSIS — N63.20 MASS OF LEFT BREAST, UNSPECIFIED QUADRANT: ICD-10-CM

## 2023-08-01 DIAGNOSIS — C50.412 MALIGNANT NEOPLASM OF UPPER-OUTER QUADRANT OF LEFT BREAST IN FEMALE, ESTROGEN RECEPTOR POSITIVE: Primary | ICD-10-CM

## 2023-08-01 DIAGNOSIS — Z17.0 MALIGNANT NEOPLASM OF UPPER-OUTER QUADRANT OF LEFT BREAST IN FEMALE, ESTROGEN RECEPTOR POSITIVE: Primary | ICD-10-CM

## 2023-08-01 LAB
GLUCOSE BLDC GLUCOMTR-MCNC: 207 MG/DL (ref 70–130)
GLUCOSE BLDC GLUCOMTR-MCNC: 231 MG/DL (ref 70–130)

## 2023-08-01 PROCEDURE — 0 TECHNETIUM FILTERED SULFUR COLLOID: Performed by: SPECIALIST

## 2023-08-01 PROCEDURE — 88341 IMHCHEM/IMCYTCHM EA ADD ANTB: CPT | Performed by: SPECIALIST

## 2023-08-01 PROCEDURE — G0463 HOSPITAL OUTPT CLINIC VISIT: HCPCS | Performed by: SPECIALIST

## 2023-08-01 PROCEDURE — 88307 TISSUE EXAM BY PATHOLOGIST: CPT | Performed by: SPECIALIST

## 2023-08-01 PROCEDURE — 88342 IMHCHEM/IMCYTCHM 1ST ANTB: CPT | Performed by: SPECIALIST

## 2023-08-01 PROCEDURE — 38900 IO MAP OF SENT LYMPH NODE: CPT | Performed by: SPECIALIST

## 2023-08-01 PROCEDURE — 25010000002 ONDANSETRON PER 1 MG: Performed by: NURSE ANESTHETIST, CERTIFIED REGISTERED

## 2023-08-01 PROCEDURE — 78195 LYMPH SYSTEM IMAGING: CPT

## 2023-08-01 PROCEDURE — 25010000002 PROPOFOL 10 MG/ML EMULSION: Performed by: NURSE ANESTHETIST, CERTIFIED REGISTERED

## 2023-08-01 PROCEDURE — 82948 REAGENT STRIP/BLOOD GLUCOSE: CPT

## 2023-08-01 PROCEDURE — 25010000002 DEXAMETHASONE PER 1 MG: Performed by: NURSE ANESTHETIST, CERTIFIED REGISTERED

## 2023-08-01 PROCEDURE — 25010000002 DEXAMETHASONE PER 1 MG: Performed by: ANESTHESIOLOGY

## 2023-08-01 PROCEDURE — 25010000002 CEFAZOLIN PER 500 MG: Performed by: SPECIALIST

## 2023-08-01 PROCEDURE — 19303 MAST SIMPLE COMPLETE: CPT | Performed by: SPECIALIST

## 2023-08-01 PROCEDURE — 25010000002 METHYLENE BLUE 50 MG/10ML SOLUTION: Performed by: SPECIALIST

## 2023-08-01 PROCEDURE — A9541 TC99M SULFUR COLLOID: HCPCS | Performed by: SPECIALIST

## 2023-08-01 PROCEDURE — 38525 BIOPSY/REMOVAL LYMPH NODES: CPT | Performed by: SPECIALIST

## 2023-08-01 PROCEDURE — 25010000002 FENTANYL CITRATE (PF) 100 MCG/2ML SOLUTION: Performed by: NURSE ANESTHETIST, CERTIFIED REGISTERED

## 2023-08-01 DEVICE — MARKR TISS/BRST MAGTRACE NO/RADITON INJ/LIQ 2ML: Type: IMPLANTABLE DEVICE | Site: BREAST | Status: FUNCTIONAL

## 2023-08-01 RX ORDER — FENTANYL CITRATE 50 UG/ML
25 INJECTION, SOLUTION INTRAMUSCULAR; INTRAVENOUS
Status: DISCONTINUED | OUTPATIENT
Start: 2023-08-01 | End: 2023-08-01 | Stop reason: HOSPADM

## 2023-08-01 RX ORDER — LIDOCAINE AND PRILOCAINE 25; 25 MG/G; MG/G
1 CREAM TOPICAL ONCE
Status: COMPLETED | OUTPATIENT
Start: 2023-08-01 | End: 2023-08-01

## 2023-08-01 RX ORDER — FENTANYL CITRATE 50 UG/ML
INJECTION, SOLUTION INTRAMUSCULAR; INTRAVENOUS AS NEEDED
Status: DISCONTINUED | OUTPATIENT
Start: 2023-08-01 | End: 2023-08-01 | Stop reason: SURG

## 2023-08-01 RX ORDER — NALOXONE HCL 0.4 MG/ML
0.4 VIAL (ML) INJECTION AS NEEDED
Status: DISCONTINUED | OUTPATIENT
Start: 2023-08-01 | End: 2023-08-01 | Stop reason: HOSPADM

## 2023-08-01 RX ORDER — ONDANSETRON 2 MG/ML
INJECTION INTRAMUSCULAR; INTRAVENOUS AS NEEDED
Status: DISCONTINUED | OUTPATIENT
Start: 2023-08-01 | End: 2023-08-01 | Stop reason: SURG

## 2023-08-01 RX ORDER — FLUMAZENIL 0.1 MG/ML
0.2 INJECTION INTRAVENOUS AS NEEDED
Status: DISCONTINUED | OUTPATIENT
Start: 2023-08-01 | End: 2023-08-01 | Stop reason: HOSPADM

## 2023-08-01 RX ORDER — DEXAMETHASONE SODIUM PHOSPHATE 4 MG/ML
INJECTION, SOLUTION INTRA-ARTICULAR; INTRALESIONAL; INTRAMUSCULAR; INTRAVENOUS; SOFT TISSUE AS NEEDED
Status: DISCONTINUED | OUTPATIENT
Start: 2023-08-01 | End: 2023-08-01 | Stop reason: SURG

## 2023-08-01 RX ORDER — SODIUM CHLORIDE 0.9 % (FLUSH) 0.9 %
10 SYRINGE (ML) INJECTION EVERY 12 HOURS SCHEDULED
Status: DISCONTINUED | OUTPATIENT
Start: 2023-08-01 | End: 2023-08-01 | Stop reason: HOSPADM

## 2023-08-01 RX ORDER — DROPERIDOL 2.5 MG/ML
0.62 INJECTION, SOLUTION INTRAMUSCULAR; INTRAVENOUS ONCE AS NEEDED
Status: DISCONTINUED | OUTPATIENT
Start: 2023-08-01 | End: 2023-08-01 | Stop reason: HOSPADM

## 2023-08-01 RX ORDER — HYDROCODONE BITARTRATE AND ACETAMINOPHEN 10; 325 MG/1; MG/1
1 TABLET ORAL EVERY 4 HOURS PRN
Status: DISCONTINUED | OUTPATIENT
Start: 2023-08-01 | End: 2023-08-01 | Stop reason: HOSPADM

## 2023-08-01 RX ORDER — LIDOCAINE AND PRILOCAINE 25; 25 MG/G; MG/G
1 CREAM TOPICAL ONCE
Status: DISCONTINUED | OUTPATIENT
Start: 2023-08-01 | End: 2023-08-01

## 2023-08-01 RX ORDER — MAGNESIUM HYDROXIDE 1200 MG/15ML
LIQUID ORAL AS NEEDED
Status: DISCONTINUED | OUTPATIENT
Start: 2023-08-01 | End: 2023-08-01 | Stop reason: HOSPADM

## 2023-08-01 RX ORDER — CELECOXIB 200 MG/1
200 CAPSULE ORAL ONCE
Status: COMPLETED | OUTPATIENT
Start: 2023-08-01 | End: 2023-08-01

## 2023-08-01 RX ORDER — HYDROCODONE BITARTRATE AND ACETAMINOPHEN 7.5; 325 MG/1; MG/1
1 TABLET ORAL EVERY 4 HOURS PRN
Qty: 20 TABLET | Refills: 0 | Status: SHIPPED | OUTPATIENT
Start: 2023-08-01

## 2023-08-01 RX ORDER — HEPARIN SODIUM 5000 [USP'U]/ML
5000 INJECTION, SOLUTION INTRAVENOUS; SUBCUTANEOUS ONCE
Status: DISCONTINUED | OUTPATIENT
Start: 2023-08-01 | End: 2023-08-01

## 2023-08-01 RX ORDER — DEXTROSE MONOHYDRATE 25 G/50ML
12.5 INJECTION, SOLUTION INTRAVENOUS AS NEEDED
Status: DISCONTINUED | OUTPATIENT
Start: 2023-08-01 | End: 2023-08-01 | Stop reason: HOSPADM

## 2023-08-01 RX ORDER — HYDROCODONE BITARTRATE AND ACETAMINOPHEN 5; 325 MG/1; MG/1
1 TABLET ORAL ONCE AS NEEDED
Status: DISCONTINUED | OUTPATIENT
Start: 2023-08-01 | End: 2023-08-01 | Stop reason: HOSPADM

## 2023-08-01 RX ORDER — PROPOFOL 10 MG/ML
VIAL (ML) INTRAVENOUS AS NEEDED
Status: DISCONTINUED | OUTPATIENT
Start: 2023-08-01 | End: 2023-08-01 | Stop reason: SURG

## 2023-08-01 RX ORDER — IBUPROFEN 600 MG/1
600 TABLET ORAL ONCE AS NEEDED
Status: DISCONTINUED | OUTPATIENT
Start: 2023-08-01 | End: 2023-08-01 | Stop reason: HOSPADM

## 2023-08-01 RX ORDER — ACETAMINOPHEN 500 MG
1000 TABLET ORAL ONCE
Status: COMPLETED | OUTPATIENT
Start: 2023-08-01 | End: 2023-08-01

## 2023-08-01 RX ORDER — ACETAMINOPHEN 500 MG
1000 TABLET ORAL ONCE
Status: DISCONTINUED | OUTPATIENT
Start: 2023-08-01 | End: 2023-08-01 | Stop reason: HOSPADM

## 2023-08-01 RX ORDER — SODIUM CHLORIDE 0.9 % (FLUSH) 0.9 %
10 SYRINGE (ML) INJECTION AS NEEDED
Status: DISCONTINUED | OUTPATIENT
Start: 2023-08-01 | End: 2023-08-01 | Stop reason: HOSPADM

## 2023-08-01 RX ORDER — LIDOCAINE HYDROCHLORIDE 20 MG/ML
INJECTION, SOLUTION EPIDURAL; INFILTRATION; INTRACAUDAL; PERINEURAL AS NEEDED
Status: DISCONTINUED | OUTPATIENT
Start: 2023-08-01 | End: 2023-08-01 | Stop reason: SURG

## 2023-08-01 RX ORDER — LABETALOL HYDROCHLORIDE 5 MG/ML
5 INJECTION, SOLUTION INTRAVENOUS
Status: DISCONTINUED | OUTPATIENT
Start: 2023-08-01 | End: 2023-08-01 | Stop reason: HOSPADM

## 2023-08-01 RX ORDER — SODIUM CHLORIDE, SODIUM LACTATE, POTASSIUM CHLORIDE, CALCIUM CHLORIDE 600; 310; 30; 20 MG/100ML; MG/100ML; MG/100ML; MG/100ML
100 INJECTION, SOLUTION INTRAVENOUS CONTINUOUS
Status: DISCONTINUED | OUTPATIENT
Start: 2023-08-01 | End: 2023-08-01 | Stop reason: HOSPADM

## 2023-08-01 RX ORDER — ONDANSETRON 2 MG/ML
4 INJECTION INTRAMUSCULAR; INTRAVENOUS ONCE AS NEEDED
Status: DISCONTINUED | OUTPATIENT
Start: 2023-08-01 | End: 2023-08-01 | Stop reason: HOSPADM

## 2023-08-01 RX ORDER — SODIUM CHLORIDE, SODIUM LACTATE, POTASSIUM CHLORIDE, CALCIUM CHLORIDE 600; 310; 30; 20 MG/100ML; MG/100ML; MG/100ML; MG/100ML
9 INJECTION, SOLUTION INTRAVENOUS CONTINUOUS
Status: DISCONTINUED | OUTPATIENT
Start: 2023-08-01 | End: 2023-08-01 | Stop reason: HOSPADM

## 2023-08-01 RX ORDER — DEXAMETHASONE SODIUM PHOSPHATE 4 MG/ML
4 INJECTION, SOLUTION INTRA-ARTICULAR; INTRALESIONAL; INTRAMUSCULAR; INTRAVENOUS; SOFT TISSUE ONCE AS NEEDED
Status: COMPLETED | OUTPATIENT
Start: 2023-08-01 | End: 2023-08-01

## 2023-08-01 RX ADMIN — CEFAZOLIN 2000 MG: 2 INJECTION, POWDER, FOR SOLUTION INTRAMUSCULAR; INTRAVENOUS at 15:29

## 2023-08-01 RX ADMIN — ACETAMINOPHEN 1000 MG: 500 TABLET, FILM COATED ORAL at 10:47

## 2023-08-01 RX ADMIN — PROPOFOL 200 MG: 10 INJECTION, EMULSION INTRAVENOUS at 15:26

## 2023-08-01 RX ADMIN — FENTANYL CITRATE 50 MCG: 50 INJECTION, SOLUTION INTRAMUSCULAR; INTRAVENOUS at 16:13

## 2023-08-01 RX ADMIN — LIDOCAINE AND PRILOCAINE 1 APPLICATION: 25; 25 CREAM TOPICAL at 10:47

## 2023-08-01 RX ADMIN — LIDOCAINE HYDROCHLORIDE 100 MG: 20 INJECTION, SOLUTION EPIDURAL; INFILTRATION; INTRACAUDAL; PERINEURAL at 15:26

## 2023-08-01 RX ADMIN — CELECOXIB 200 MG: 200 CAPSULE ORAL at 10:46

## 2023-08-01 RX ADMIN — DEXAMETHASONE SODIUM PHOSPHATE 4 MG: 4 INJECTION, SOLUTION INTRA-ARTICULAR; INTRALESIONAL; INTRAMUSCULAR; INTRAVENOUS; SOFT TISSUE at 15:39

## 2023-08-01 RX ADMIN — FENTANYL CITRATE 50 MCG: 50 INJECTION, SOLUTION INTRAMUSCULAR; INTRAVENOUS at 15:55

## 2023-08-01 RX ADMIN — TECHNETIUM TC 99M SULFUR COLLOID 1 DOSE: KIT at 11:45

## 2023-08-01 RX ADMIN — FENTANYL CITRATE 50 MCG: 50 INJECTION, SOLUTION INTRAMUSCULAR; INTRAVENOUS at 15:45

## 2023-08-01 RX ADMIN — FENTANYL CITRATE 50 MCG: 50 INJECTION, SOLUTION INTRAMUSCULAR; INTRAVENOUS at 16:29

## 2023-08-01 RX ADMIN — DEXAMETHASONE SODIUM PHOSPHATE 4 MG: 4 INJECTION, SOLUTION INTRAMUSCULAR; INTRAVENOUS at 14:46

## 2023-08-01 RX ADMIN — ONDANSETRON 4 MG: 2 INJECTION INTRAMUSCULAR; INTRAVENOUS at 16:55

## 2023-08-01 RX ADMIN — HYDROCODONE BITARTRATE AND ACETAMINOPHEN 1 TABLET: 10; 325 TABLET ORAL at 17:42

## 2023-08-01 RX ADMIN — SODIUM CHLORIDE, POTASSIUM CHLORIDE, SODIUM LACTATE AND CALCIUM CHLORIDE 100 ML/HR: 600; 310; 30; 20 INJECTION, SOLUTION INTRAVENOUS at 10:47

## 2023-08-01 NOTE — OP NOTE
BREAST MASTECTOMY WITH MAG SEED PLACEMENT AND SENTINEL NODE BIOPSY  Procedure Note    Pam Clayton  8/1/2023    Pre-op Diagnosis:   Mass of left breast, unspecified quadrant [N63.20]    Post-op Diagnosis:     Post-Op Diagnosis Codes:     * Mass of left breast, unspecified quadrant [N63.20]    Procedure/CPTr Codes:      Procedure(s):  LEFT MASTECTOMY WITH MAGTRACE GUIDED SENTINEL LYMPH NODE BIOPSY    Surgeon(s):  Tonia Ledezma MD    Anesthesia: General with Block    Staff:   * No surgical staff found *    Estimated Blood Loss: minimal    Specimens:                A: Left mastectomy and sentinel lymph node sent separately      Drains:   Closed/Suction Drain 1 Inferior;Lateral;Left Breast 10 Fr. (Active)       Findings: [place] lymph node in left axilla -none blue dye and mag trace positive    Complications: None       Description: The patient was brought to the operating room and placed in the supine position.  After induction of general anesthesia and infusion of IV antibiotics the mag trace was injected in the left upper outer quadrant, the blue dye was injected in the 4 quadrants around the nipple and massaged for 5 minutes.  The patient was prepped and draped in the usual sterile fashion.  Elliptical incision was marked including the nipple.  Using a 10 blade the incision was made.  Subcutaneous tissue was dissected electrocautery.  Flaps were raised superiorly and inferiorly medially and laterally.  The breast was indicated off the chest wall with electrocautery.  Irrigation and aspiration was done.  Hemostasis was obtained.  Then using the mag trace probe, the lymph node was dissected free and excised.  It was positive for blue dye as well.  There is no further activity noted in the axilla.  More irrigation was performed the wound was closed over a drain using interrupted 20 and 3-0 Vicryl sutures as well as running 4-0 Vicryl subcuticular.  Dressing was placed patient was awakened and transferred to the  recovery room in stable condition having tolerated the procedure well at the end of the procedure WERE correct.    [place] Node Biopsy for Breast Cancer - Left  Operation performed with curative intent. Yes   Tracer(s) used to identify sentinel nodes in the upfront surgery (non-neoadjuvant) setting (select all that apply). Dye and Superparamagnetic iron oxide   Tracer(s) used to identify sentinel nodes in the neoadjuvant setting (select all that apply). Dye and Superparamagnetic iron oxide   All nodes (colored or non-colored) present at the end of a dye-filled lymphatic channel were removed. Yes   All significantly radioactive nodes were removed. Yes   All palpably suspicious nodes were removed. Yes   Biopsy-proven positive nodes marked with clips prior to chemotherapy were identified and removed. N/A              Tonia Ledezma MD     Date: 8/1/2023  Time: 16:54 CDT

## 2023-08-04 LAB
CYTO UR: NORMAL
LAB AP CASE REPORT: NORMAL
LAB AP SPECIAL STAINS: NORMAL
LAB AP SYNOPTIC CHECKLIST: NORMAL
Lab: NORMAL
PATH REPORT.FINAL DX SPEC: NORMAL
PATH REPORT.GROSS SPEC: NORMAL

## 2023-08-08 ENCOUNTER — OFFICE VISIT (OUTPATIENT)
Dept: SURGERY | Facility: CLINIC | Age: 78
End: 2023-08-08
Payer: MEDICARE

## 2023-08-08 VITALS
SYSTOLIC BLOOD PRESSURE: 143 MMHG | OXYGEN SATURATION: 98 % | HEIGHT: 64 IN | WEIGHT: 148 LBS | DIASTOLIC BLOOD PRESSURE: 60 MMHG | BODY MASS INDEX: 25.27 KG/M2 | HEART RATE: 91 BPM

## 2023-08-08 DIAGNOSIS — Z17.0 MALIGNANT NEOPLASM OF UPPER-OUTER QUADRANT OF LEFT BREAST IN FEMALE, ESTROGEN RECEPTOR POSITIVE: Primary | ICD-10-CM

## 2023-08-08 DIAGNOSIS — C50.412 MALIGNANT NEOPLASM OF UPPER-OUTER QUADRANT OF LEFT BREAST IN FEMALE, ESTROGEN RECEPTOR POSITIVE: Primary | ICD-10-CM

## 2023-08-08 PROCEDURE — 99024 POSTOP FOLLOW-UP VISIT: CPT | Performed by: SPECIALIST

## 2023-08-08 PROCEDURE — 1159F MED LIST DOCD IN RCRD: CPT | Performed by: SPECIALIST

## 2023-08-08 PROCEDURE — 1160F RVW MEDS BY RX/DR IN RCRD: CPT | Performed by: SPECIALIST

## 2023-08-08 PROCEDURE — 3077F SYST BP >= 140 MM HG: CPT | Performed by: SPECIALIST

## 2023-08-08 PROCEDURE — 3078F DIAST BP <80 MM HG: CPT | Performed by: SPECIALIST

## 2023-08-08 NOTE — PROGRESS NOTES
"Patient: Pam Clayton    YOB: 1945    Date: 08/08/2023    Primary Care Provider: Hung Nicole MD    Vital Signs:   Vitals:    08/08/23 0955   BP: 143/60   Pulse: 91   SpO2: 98%   Weight: 67.1 kg (148 lb)   Height: 162.6 cm (64\")       CC-1 week follow-up status postmastectomy    HPI she is in a soft collar for vertigo and now she getting physical therapy for this.  Is been a chronic long-term problem.  Her GLADIS drain has been serosanguineous in nature but pretty prominent the last couple days    Exam-wound looks good a GLADIS drain is serosanguineous no fluid better in the bed.  She has good strong shoulder shrug and no troubles with arm extension plan to monitor the GLADIS drain for more week.    Results Review:   I reviewed the patient's new clinical results.        Assessment / Plan:    There are no diagnoses linked to this encounter.      Plan: I am going to leave the drain for 1 more week.    Electronically signed by Tonia Ledezma MD  08/08/23  12:21 CDT                  "

## 2023-08-10 ENCOUNTER — DOCUMENTATION (OUTPATIENT)
Dept: SURGERY | Facility: CLINIC | Age: 78
End: 2023-08-10
Payer: MEDICARE

## 2023-08-10 NOTE — PROGRESS NOTES
Records faxed to Dr. Dominguez/Skyler for an appointment. They will call patient with appointment date and time.

## 2023-08-15 ENCOUNTER — OFFICE VISIT (OUTPATIENT)
Dept: SURGERY | Facility: CLINIC | Age: 78
End: 2023-08-15
Payer: MEDICARE

## 2023-08-15 VITALS
SYSTOLIC BLOOD PRESSURE: 143 MMHG | BODY MASS INDEX: 25.25 KG/M2 | HEIGHT: 64 IN | WEIGHT: 147.93 LBS | DIASTOLIC BLOOD PRESSURE: 60 MMHG

## 2023-08-15 DIAGNOSIS — C50.412 MALIGNANT NEOPLASM OF UPPER-OUTER QUADRANT OF LEFT BREAST IN FEMALE, ESTROGEN RECEPTOR POSITIVE: Primary | ICD-10-CM

## 2023-08-15 DIAGNOSIS — Z17.0 MALIGNANT NEOPLASM OF UPPER-OUTER QUADRANT OF LEFT BREAST IN FEMALE, ESTROGEN RECEPTOR POSITIVE: Primary | ICD-10-CM

## 2023-08-15 PROCEDURE — 1160F RVW MEDS BY RX/DR IN RCRD: CPT | Performed by: SPECIALIST

## 2023-08-15 PROCEDURE — 99024 POSTOP FOLLOW-UP VISIT: CPT | Performed by: SPECIALIST

## 2023-08-15 PROCEDURE — 1159F MED LIST DOCD IN RCRD: CPT | Performed by: SPECIALIST

## 2023-08-15 PROCEDURE — 3078F DIAST BP <80 MM HG: CPT | Performed by: SPECIALIST

## 2023-08-15 PROCEDURE — 3077F SYST BP >= 140 MM HG: CPT | Performed by: SPECIALIST

## 2023-08-15 NOTE — PROGRESS NOTES
"Patient: Pam Clayton    YOB: 1945    Date: 08/15/2023    Primary Care Provider: Hung Nicole MD    Chief Complaint   Patient presents with    Post-op       Subjective .     History of present illness:   She is here for follow-up    The following portions of the patient's history were reviewed and updated as appropriate: allergies, current medications, past family history, past medical history, past social history, past surgical history and problem list.    History:  Past Medical History:   Diagnosis Date    Arthritis     Bladder cancer     Bladder cancer    Diabetes mellitus     GERD (gastroesophageal reflux disease)     Hard of hearing     Hx of excision of tumor of brain meninges     Hyperlipidemia     Hypertension     UTI (urinary tract infection)     Vertigo           Past Surgical History:   Procedure Laterality Date    APPENDECTOMY      BRAIN TUMOR EXCISION      CHOLECYSTECTOMY      CYSTOSCOPY BLADDER BIOPSY N/A 7/25/2018    Procedure: CYSTOSCOPY BILATERAL RETROGRADE PYELOGRAM BLADDER BIOPSIES WITH FULGURATION;  Surgeon: Rj Tijerina MD;  Location: [facility] OR;  Service: Urology    CYSTOSCOPY BLADDER BIOPSY N/A 10/31/2018    Procedure: CYSTOSCOPY BLADDER BIOPSY WITH FULGURATION;  Surgeon: Rj Tijerina MD;  Location: [facility] OR;  Service: Urology    HYSTERECTOMY      MASTECTOMY W/ SENTINEL NODE BIOPSY Left 8/1/2023    Procedure: BREAST MASTECTOMY WITH MAG SEED GUIDED AND SENTINEL NODE BIOPSY;  Surgeon: Tonia Ledezma MD;  Location: [facility] OR;  Service: General;  Laterality: Left;    TOTAL KNEE ARTHROPLASTY Bilateral        Family History   Problem Relation Age of Onset    No Known Problems Father     Cancer Mother     Diabetes Mother     Heart disease Mother        Social History     Tobacco Use    Smoking status: Never    Smokeless tobacco: Never   Vaping Use    Vaping Use: Never used   Substance Use Topics    Alcohol use: Yes     Comment: \"One drink a year\"    Drug " use: No       Allergies:  Allergies   Allergen Reactions    Halothane Anaphylaxis and Unknown (See Comments)     STATES MOTHER HAD ANAPHYLAXIS REACTION TO THIS    Atorvastatin Myalgia    Other Other (See Comments)     THE PATIENT STATES THAT STEROIDS MAKE HER SHAKY, SHE STATES THAT CORTISONE CAUSED HOSPITALIZATION     Cortisone Other (See Comments)    Lisinopril Unknown (See Comments)     DOES NOT REMEMBER    Metformin Diarrhea    Mometasone Furoate Unknown (See Comments)     DOES NOT REMEMBER    Sulfa Antibiotics Unknown (See Comments)     PT STATES SHE DOESN'T REMEMBER WHAT THIS MED DID       Tramadol Delirium    Codeine Anxiety     STATES SHE GETS A HEADACHE      Cortisol Manager [Cholestatin] Anxiety    Dexlansoprazole Diarrhea    Levaquin [Levofloxacin] Confusion     made her sick and loopy    Phenytoin Sodium Extended Itching       Medications:     Current Outpatient Medications:     albuterol sulfate  (90 Base) MCG/ACT inhaler, Inhale 1 puff Every 4 (Four) Hours As Needed for Shortness of Air or Wheezing., Disp: , Rfl:     Cetirizine HCl 10 MG capsule, Take 1 tablet by mouth Daily., Disp: , Rfl:     Coenzyme Q10 10 MG capsule, Take 400 mg by mouth Daily., Disp: , Rfl:     Collagen-Vitamin C-Biotin (Collagen 1500/C) 500-50-0.8 MG capsule, Take 3 tablets by mouth Daily., Disp: , Rfl:     esomeprazole (nexIUM) 40 MG capsule, Take 1 capsule by mouth Every Morning Before Breakfast., Disp: , Rfl:     glipiZIDE (GLUCOTROL XL) 5 MG ER tablet, Take 1 tablet by mouth Daily., Disp: , Rfl: 2    glucosamine-chondroitin 500-400 MG capsule capsule, Take 1 capsule by mouth 2 (Two) Times a Day., Disp: , Rfl:     HYDROcodone-acetaminophen (NORCO) 7.5-325 MG per tablet, Take 1 tablet by mouth Every 4 (Four) Hours As Needed for Moderate Pain (Pain)., Disp: 20 tablet, Rfl: 0    insulin detemir (LEVEMIR) 100 UNIT/ML injection, Inject 48 Units under the skin into the appropriate area as directed Every Night., Disp: , Rfl:     " insulin glargine (LANTUS, SEMGLEE) 100 UNIT/ML injection, Inject 48 Units under the skin into the appropriate area as directed Daily. Every night, Disp: , Rfl:     insulin lispro protamine-insulin lispro (humaLOG 50-50) (50-50) 100 UNIT/ML suspension injection, Inject 6 Units under the skin into the appropriate area as directed 3 (Three) Times a Day., Disp: , Rfl:     Lactobacillus-Inulin ([facility] [employer] [facility]) capsule, Take 1 tablet by mouth Daily., Disp: , Rfl:     losartan (COZAAR) 50 MG tablet, Take 1 tablet by mouth Daily., Disp: , Rfl: 1    metFORMIN ER (GLUCOPHAGE-XR) 500 MG 24 hr tablet, Take 1 tablet by mouth Daily., Disp: 30 tablet, Rfl: 3    metoprolol succinate XL (TOPROL-XL) 100 MG 24 hr tablet, Take 0.5 tablets by mouth Every Night., Disp: , Rfl:     MULTIPLE VITAMIN PO, Take 1 tablet by mouth Daily., Disp: , Rfl:     rivaroxaban (Xarelto) 10 MG tablet, Take 1 tablet by mouth Daily., Disp: 14 tablet, Rfl: 0    Current Facility-Administered Medications:     lidocaine (XYLOCAINE) 1 % injection 10 mL, 10 mL, Subcutaneous, Once, Sarai Gauthier MD    lidocaine 1% - EPINEPHrine [id number] (XYLOCAINE W/EPI) 1 %-1:[id number] injection 10 mL, 10 mL, Injection, Once, Sarai Gauthier MD    Vital Signs:   Vitals:    08/15/23 1043   BP: 143/60   BP Location: Left arm   Patient Position: Sitting   Cuff Size: Adult   Weight: 67.1 kg (147 lb 14.9 oz)   Height: 162.6 cm (64.02\")     On exam, patient doing well.  Having some pain on her chest wall around her incision and axilla.  GLADIS drain has serous fluid draining around it and is clogged.  I removed that today.      Results Review:   I reviewed the patient's new clinical results.        Assessment / Plan:    Diagnoses and all orders for this visit:    1. Malignant neoplasm of upper-outer quadrant of left breast in female, estrogen receptor positive (Primary)        Follow-up 1 week      Electronically signed by Tonia Ledezma MD  08/15/23  12:45 " CDT

## 2023-08-16 DIAGNOSIS — C50.912 LOBULAR CARCINOMA OF LEFT BREAST (HCC): Primary | ICD-10-CM

## 2023-08-17 ENCOUNTER — OFFICE VISIT (OUTPATIENT)
Dept: HEMATOLOGY | Age: 78
End: 2023-08-17
Payer: MEDICARE

## 2023-08-17 ENCOUNTER — HOSPITAL ENCOUNTER (OUTPATIENT)
Dept: INFUSION THERAPY | Age: 78
Discharge: HOME | End: 2023-08-17
Payer: MEDICARE

## 2023-08-17 VITALS
WEIGHT: 145.3 LBS | TEMPERATURE: 97.3 F | HEART RATE: 96 BPM | HEIGHT: 66 IN | DIASTOLIC BLOOD PRESSURE: 80 MMHG | SYSTOLIC BLOOD PRESSURE: 138 MMHG | BODY MASS INDEX: 23.35 KG/M2 | OXYGEN SATURATION: 97 %

## 2023-08-17 DIAGNOSIS — C50.912 LOBULAR CARCINOMA OF LEFT BREAST, STAGE 1, ESTROGEN RECEPTOR POSITIVE (HCC): Primary | ICD-10-CM

## 2023-08-17 DIAGNOSIS — Z51.81 ENCOUNTER FOR MONITORING TAMOXIFEN THERAPY: ICD-10-CM

## 2023-08-17 DIAGNOSIS — Z71.89 CARE PLAN DISCUSSED WITH PATIENT: ICD-10-CM

## 2023-08-17 DIAGNOSIS — Z17.0 LOBULAR CARCINOMA OF LEFT BREAST, STAGE 1, ESTROGEN RECEPTOR POSITIVE (HCC): Primary | ICD-10-CM

## 2023-08-17 DIAGNOSIS — D05.02 LOBULAR CARCINOMA IN SITU (LCIS) OF LEFT BREAST: ICD-10-CM

## 2023-08-17 DIAGNOSIS — C50.912 LOBULAR CARCINOMA OF LEFT BREAST (HCC): ICD-10-CM

## 2023-08-17 DIAGNOSIS — Z79.810 ENCOUNTER FOR MONITORING TAMOXIFEN THERAPY: ICD-10-CM

## 2023-08-17 LAB
ERYTHROCYTE [DISTWIDTH] IN BLOOD BY AUTOMATED COUNT: 13.8 % (ref 11.7–14.4)
HCT VFR BLD AUTO: 38.7 % (ref 34.1–44.9)
HGB BLD-MCNC: 12.4 G/DL (ref 11.2–15.7)
LYMPHOCYTES # BLD: 3.37 K/UL (ref 1.18–3.74)
LYMPHOCYTES NFR BLD: 22.3 % (ref 19.3–53.1)
MCH RBC QN AUTO: 26.5 PG (ref 25.6–32.2)
MCHC RBC AUTO-ENTMCNC: 32 G/DL (ref 32.3–35.5)
MCV RBC AUTO: 82.7 FL (ref 79.4–94.8)
MONOCYTES # BLD: 0.65 K/UL (ref 0.24–0.82)
MONOCYTES NFR BLD: 4.3 % (ref 4.7–12.5)
NEUTROPHILS # BLD: 10.72 K/UL (ref 1.56–6.13)
NEUTS SEG NFR BLD: 71.2 % (ref 34–71.1)
PLATELET # BLD AUTO: 326 K/UL (ref 182–369)
PMV BLD AUTO: 11 FL (ref 7.4–10.4)
RBC # BLD AUTO: 4.68 M/UL (ref 3.93–5.22)
WBC # BLD AUTO: 15.08 K/UL (ref 3.98–10.04)

## 2023-08-17 PROCEDURE — 36415 COLL VENOUS BLD VENIPUNCTURE: CPT

## 2023-08-17 PROCEDURE — G8420 CALC BMI NORM PARAMETERS: HCPCS | Performed by: INTERNAL MEDICINE

## 2023-08-17 PROCEDURE — G8400 PT W/DXA NO RESULTS DOC: HCPCS | Performed by: INTERNAL MEDICINE

## 2023-08-17 PROCEDURE — 1123F ACP DISCUSS/DSCN MKR DOCD: CPT | Performed by: INTERNAL MEDICINE

## 2023-08-17 PROCEDURE — 1036F TOBACCO NON-USER: CPT | Performed by: INTERNAL MEDICINE

## 2023-08-17 PROCEDURE — 99212 OFFICE O/P EST SF 10 MIN: CPT

## 2023-08-17 PROCEDURE — G8427 DOCREV CUR MEDS BY ELIG CLIN: HCPCS | Performed by: INTERNAL MEDICINE

## 2023-08-17 PROCEDURE — 99204 OFFICE O/P NEW MOD 45 MIN: CPT | Performed by: INTERNAL MEDICINE

## 2023-08-17 PROCEDURE — 85025 COMPLETE CBC W/AUTO DIFF WBC: CPT

## 2023-08-17 PROCEDURE — 1090F PRES/ABSN URINE INCON ASSESS: CPT | Performed by: INTERNAL MEDICINE

## 2023-08-17 RX ORDER — TAMOXIFEN CITRATE 10 MG/1
10 TABLET ORAL DAILY
Qty: 30 TABLET | Refills: 5 | Status: SHIPPED | OUTPATIENT
Start: 2023-08-17

## 2023-08-22 ENCOUNTER — OFFICE VISIT (OUTPATIENT)
Dept: SURGERY | Facility: CLINIC | Age: 78
End: 2023-08-22
Payer: MEDICARE

## 2023-08-22 VITALS
BODY MASS INDEX: 25.25 KG/M2 | HEIGHT: 64 IN | SYSTOLIC BLOOD PRESSURE: 143 MMHG | WEIGHT: 147.93 LBS | DIASTOLIC BLOOD PRESSURE: 60 MMHG

## 2023-08-22 DIAGNOSIS — C50.912 MALIGNANT NEOPLASM OF LEFT FEMALE BREAST, UNSPECIFIED ESTROGEN RECEPTOR STATUS, UNSPECIFIED SITE OF BREAST: Primary | ICD-10-CM

## 2023-08-22 PROCEDURE — 99024 POSTOP FOLLOW-UP VISIT: CPT | Performed by: SPECIALIST

## 2023-08-22 PROCEDURE — 1160F RVW MEDS BY RX/DR IN RCRD: CPT | Performed by: SPECIALIST

## 2023-08-22 PROCEDURE — 3078F DIAST BP <80 MM HG: CPT | Performed by: SPECIALIST

## 2023-08-22 PROCEDURE — 1159F MED LIST DOCD IN RCRD: CPT | Performed by: SPECIALIST

## 2023-08-22 PROCEDURE — 3077F SYST BP >= 140 MM HG: CPT | Performed by: SPECIALIST

## 2023-08-22 NOTE — PROGRESS NOTES
"Patient: Pam Clayton    YOB: 1945    Date: 08/22/2023    Primary Care Provider: Hung Nicole MD    Vital Signs:   Vitals:    08/22/23 1019   BP: 143/60   BP Location: Left arm   Patient Position: Sitting   Cuff Size: Adult   Weight: 67.1 kg (147 lb 14.9 oz)   Height: 162.6 cm (64.02\")       Doing well.  She is accumulated fluid in her operative bed.  I aspirated approximate 150 cc of serous fluid.    Results Review:   I reviewed the patient's new clinical results.        Assessment / Plan:    Diagnoses and all orders for this visit:    1. Malignant neoplasm of left female breast, unspecified estrogen receptor status, unspecified site of breast (Primary)        Follow-up 1 week      Electronically signed by Tonia Ledezma MD  08/22/23  10:41 CDT                    "

## 2023-08-25 ENCOUNTER — OFFICE VISIT (OUTPATIENT)
Dept: SURGERY | Facility: CLINIC | Age: 78
End: 2023-08-25
Payer: MEDICARE

## 2023-08-25 VITALS
SYSTOLIC BLOOD PRESSURE: 143 MMHG | WEIGHT: 147.93 LBS | HEIGHT: 64 IN | DIASTOLIC BLOOD PRESSURE: 60 MMHG | BODY MASS INDEX: 25.25 KG/M2

## 2023-08-25 DIAGNOSIS — C50.912 MALIGNANT NEOPLASM OF LEFT FEMALE BREAST, UNSPECIFIED ESTROGEN RECEPTOR STATUS, UNSPECIFIED SITE OF BREAST: Primary | ICD-10-CM

## 2023-08-25 NOTE — PATIENT INSTRUCTIONS
Very nice to meet you today Ms. Clayton, we aspirated 80 cc off the operative incision I will see you back in 2 weeks to recheck the area please follow-up with Dr. Ledezma this coming Tuesday the 29th for another evaluation and aspiration as needed

## 2023-08-25 NOTE — PROGRESS NOTES
"Office Established Patient Note:     Referring Provider: Dr. Hung Nicole    Chief complaint 3-week status post mastectomy with seroma    Subjective .     History of present illness:  Pam Clayton is a 78 y.o. female currently 3 weeks out from a left mastectomy with a seroma present, she was aspirated earlier this week and she is here for follow-up complaining of fluid, we will evaluate the area and aspirate as needed.    History  Past Medical History:   Diagnosis Date    Arthritis     Bladder cancer     Bladder cancer    Diabetes mellitus     GERD (gastroesophageal reflux disease)     Hard of hearing     Hx of excision of tumor of brain meninges     Hyperlipidemia     Hypertension     UTI (urinary tract infection)     Vertigo    ,   Past Surgical History:   Procedure Laterality Date    APPENDECTOMY      BRAIN TUMOR EXCISION      CHOLECYSTECTOMY      CYSTOSCOPY BLADDER BIOPSY N/A 7/25/2018    Procedure: CYSTOSCOPY BILATERAL RETROGRADE PYELOGRAM BLADDER BIOPSIES WITH FULGURATION;  Surgeon: Rj Tijerina MD;  Location: [facility] OR;  Service: Urology    CYSTOSCOPY BLADDER BIOPSY N/A 10/31/2018    Procedure: CYSTOSCOPY BLADDER BIOPSY WITH FULGURATION;  Surgeon: Rj Tijerina MD;  Location:  [facility] OR;  Service: Urology    HYSTERECTOMY      MASTECTOMY W/ SENTINEL NODE BIOPSY Left 8/1/2023    Procedure: BREAST MASTECTOMY WITH MAG SEED GUIDED AND SENTINEL NODE BIOPSY;  Surgeon: Tonia Ledezma MD;  Location:  [facility] OR;  Service: General;  Laterality: Left;    TOTAL KNEE ARTHROPLASTY Bilateral    ,   Family History   Problem Relation Age of Onset    No Known Problems Father     Cancer Mother     Diabetes Mother     Heart disease Mother    ,   Social History     Tobacco Use    Smoking status: Never    Smokeless tobacco: Never   Vaping Use    Vaping Use: Never used   Substance Use Topics    Alcohol use: Yes     Comment: \"One drink a year\"    Drug use: No   , (Not in a hospital admission)   and Allergies:  " Halothane, Atorvastatin, Other, Cortisone, Lisinopril, Metformin, Mometasone furoate, Sulfa antibiotics, Tramadol, Codeine, Cortisol manager [cholestatin], Dexlansoprazole, Levaquin [levofloxacin], and Phenytoin sodium extended    Current Outpatient Medications:     albuterol sulfate  (90 Base) MCG/ACT inhaler, Inhale 1 puff Every 4 (Four) Hours As Needed for Shortness of Air or Wheezing., Disp: , Rfl:     Cetirizine HCl 10 MG capsule, Take 1 tablet by mouth Daily., Disp: , Rfl:     Coenzyme Q10 10 MG capsule, Take 400 mg by mouth Daily., Disp: , Rfl:     Collagen-Vitamin C-Biotin (Collagen 1500/C) 500-50-0.8 MG capsule, Take 3 tablets by mouth Daily., Disp: , Rfl:     esomeprazole (nexIUM) 40 MG capsule, Take 1 capsule by mouth Every Morning Before Breakfast., Disp: , Rfl:     glipiZIDE (GLUCOTROL XL) 5 MG ER tablet, Take 1 tablet by mouth Daily., Disp: , Rfl: 2    glucosamine-chondroitin 500-400 MG capsule capsule, Take 1 capsule by mouth 2 (Two) Times a Day., Disp: , Rfl:     HYDROcodone-acetaminophen (NORCO) 7.5-325 MG per tablet, Take 1 tablet by mouth Every 4 (Four) Hours As Needed for Moderate Pain (Pain)., Disp: 20 tablet, Rfl: 0    insulin detemir (LEVEMIR) 100 UNIT/ML injection, Inject 48 Units under the skin into the appropriate area as directed Every Night., Disp: , Rfl:     insulin glargine (LANTUS, SEMGLEE) 100 UNIT/ML injection, Inject 48 Units under the skin into the appropriate area as directed Daily. Every night, Disp: , Rfl:     insulin lispro protamine-insulin lispro (humaLOG 50-50) (50-50) 100 UNIT/ML suspension injection, Inject 6 Units under the skin into the appropriate area as directed 3 (Three) Times a Day., Disp: , Rfl:     Lactobacillus-Inulin ([facility] [employer]) capsule, Take 1 tablet by mouth Daily., Disp: , Rfl:     losartan (COZAAR) 50 MG tablet, Take 1 tablet by mouth Daily., Disp: , Rfl: 1    metFORMIN ER (GLUCOPHAGE-XR) 500 MG 24 hr tablet, Take 1 tablet by mouth  "Daily., Disp: 30 tablet, Rfl: 3    metoprolol succinate XL (TOPROL-XL) 100 MG 24 hr tablet, Take 0.5 tablets by mouth Every Night., Disp: , Rfl:     MULTIPLE VITAMIN PO, Take 1 tablet by mouth Daily., Disp: , Rfl:     rivaroxaban (Xarelto) 10 MG tablet, Take 1 tablet by mouth Daily., Disp: 14 tablet, Rfl: 0    Current Facility-Administered Medications:     lidocaine (XYLOCAINE) 1 % injection 10 mL, 10 mL, Subcutaneous, Once, Sarai Gauthier MD    lidocaine 1% - EPINEPHrine 1:828289 (XYLOCAINE W/EPI) 1 %-1:131944 injection 10 mL, 10 mL, Injection, Once, Sarai Gauthier MD    Objective     Vital Signs   /60 (BP Location: Left arm, Patient Position: Sitting, Cuff Size: Adult)   Ht 162.6 cm (64.02\")   Wt 67.1 kg (147 lb 14.9 oz)   BMI 25.38 kg/mý      Physical Exam:  Left chest status post left mastectomy, nicely healed wound, minimal redundancy toward the posterior aspect, nicely healed wound, no sign of any infection there is some fluid present, area was cleaned and aspirated yielding 80 cc of slightly blood-tinged fluid, no purulence noted no cellulitis.  Full resolution of the seroma completed,    Results Review:  Result Review :  Lab Results   Component Value Date    FINALDX  08/01/2023     1.  Left breast, mastectomy:  A.  Invasive lobular carcinoma, grade 1, identified at prior biopsy site (18 mm).  B.  Associated lobular carcinoma in situ.  C.  Focal atypical lobular hyperplasia identified in random section from upper outer quadrant of breast.  D.  No evidence of Paget's disease of nipple.  E.  Skin and closest inked (posterior) surgical margin are negative for malignancy.  F.  Prior biopsy site changes including fibrosis and fat necrosis.  G.  Columnar cell changes without atypia.  H.  Usual ductal hyperplasia.  I.  Fibrocystic changes of breast with papillary apocrine change.  J.  Medial calcifications identified in mammary vasculature.  K.  Seborrheic keratoses, skin of breast.    2.  Left " "axillary sentinel lymph nodes:  A. Four of 4 lymph nodes are negative for metastatic carcinoma.  B.  Absence of micrometastases is confirmed utilizing immunohistochemical stains for pankeratin.    AJCC stage: pT1c pN0      GROSSDES  08/01/2023     1. Breast, Left.   Received in a formalin filled container labeled with the patient's name, date of birth, and \"left breast\".  The specimen is received after hours on 8/1 and processed on the morning of 8/2.  The specimen has a cold ischemic time of 35 minutes and a formalin fixation time of 28-1/2 hours.  The specimen consists of a left breast measuring 18.5 cm medial to lateral by 14.7 cm superior to inferior by 3.3 cm anterior to posterior and weighing 671 g.  The attached skin ellipse measures 22.5 x 15.3 cm.  The skin surface is marked by multiple tan-brown, slightly raised lesions mostly confined to the inferior-medial quadrant and ranging from 0.2 cm to 0.8 cm in greatest dimension.  The nipple appears unremarkable.  Sectioning through the lesions shows superficial thickening and are otherwise unremarkable.  The posterior margin is yellow-tan, mostly flattened and intact.  The posterior margin is inked black.  Sectioning reveals a jelly filled biopsy cavity in the central 1:00 aspect of the breast.  The biopsy cavity measures 1.4 cm in length by 0.5 cm in diameter.  The biopsy clip is identified within the gelatinous plug.  The biopsy cavity measures 3.5 cm from the posterior margin.  Surrounding the biopsy cavity is a slightly more stellate yellow to gray mass measuring 1.3 x 1.1 x 1.1 cm.  The soft mass measures 3.1 cm from the posterior margin, 5.5 cm from the superior margin, 8.3 cm from the inferior margin, 9.7 cm from the lateral margin, 5.7 cm from the medial margin, 4.8 cm from the skin, and 5.5 cm superior-lateral to the nipple.  Sectioning through the remaining breast reveals yellow-pink, soft and lobulated fibroadipose tissue with thin fibrous septae and " evidence of fibrocystic change.  1A-perpendicular section of nipple  1B-representative sections of skin lesions  1C-representative section of unremarkable skin  1D-perpendicular section of posterior margin adjacent to mass  1E through 1H-representative sections of biopsy cavity and mass  1I-random upper outer quadrant  1J-random lower outer quadrant  1K-random upper inner quadrant  1L-random lower inner quadrant    2. [place] Lymph Node.   Received in a formalin filled container labeled with the patient's name, date of birth, and designated sentinel lymph node-left axilla.  The specimen consists of 2 fragments of yellow-pink soft tissue aggregating to 4.5 x 3.5 x 1.4 cm.  Dissection reveals multiple lymph node candidates measuring up to 2.9 cm.  The cut surface of the largest lymph node candidate exhibits 30% fatty replacement with green dye uptake.  The cut surface of the remaining lymph node candidates are unremarkable.  2A through 2D-largest lymph node candidate  2E and 2F-bisected lymph node candidate  2G-1 bisected and 1 intact lymph node candidate           BMI is >= 25 and <30. (Overweight) The following options were offered after discussion;: weight loss educational material (shared in after visit summary)    Assessment & Plan       Diagnoses and all orders for this visit:    1. Malignant neoplasm of left female breast, unspecified estrogen receptor status, unspecified site of breast (Primary)       Posttreatment about 18 mm lobular carcinoma with 4 sentinel lymph nodes negative, presently doing well, she will be discharged and follow-up with me in 2 weeks, presently with a seroma post mastectomy Dr. Dixon will see her this Tuesday and I will see her back in 2 weeks to assure no seroma present.    Discussed BMI elevation and need to move toward a reduced BMI for health concerns she appears to understand she is a non smoker and her meds were reviewed.      Julio Preston MD  08/25/23  09:57  CDT

## 2023-08-29 ENCOUNTER — PATIENT ROUNDING (BHMG ONLY) (OUTPATIENT)
Dept: SURGERY | Facility: CLINIC | Age: 78
End: 2023-08-29
Payer: MEDICARE

## 2023-08-29 ENCOUNTER — OFFICE VISIT (OUTPATIENT)
Dept: SURGERY | Facility: CLINIC | Age: 78
End: 2023-08-29
Payer: MEDICARE

## 2023-08-29 VITALS
DIASTOLIC BLOOD PRESSURE: 60 MMHG | SYSTOLIC BLOOD PRESSURE: 143 MMHG | WEIGHT: 147.93 LBS | BODY MASS INDEX: 25.25 KG/M2 | HEIGHT: 64 IN

## 2023-08-29 DIAGNOSIS — C50.912 MALIGNANT NEOPLASM OF LEFT FEMALE BREAST, UNSPECIFIED ESTROGEN RECEPTOR STATUS, UNSPECIFIED SITE OF BREAST: Primary | ICD-10-CM

## 2023-08-29 PROCEDURE — 1159F MED LIST DOCD IN RCRD: CPT | Performed by: SPECIALIST

## 2023-08-29 PROCEDURE — 3078F DIAST BP <80 MM HG: CPT | Performed by: SPECIALIST

## 2023-08-29 PROCEDURE — 99024 POSTOP FOLLOW-UP VISIT: CPT | Performed by: SPECIALIST

## 2023-08-29 PROCEDURE — 3077F SYST BP >= 140 MM HG: CPT | Performed by: SPECIALIST

## 2023-08-29 PROCEDURE — 1160F RVW MEDS BY RX/DR IN RCRD: CPT | Performed by: SPECIALIST

## 2023-08-29 NOTE — PROGRESS NOTES
August 29, 2023    Hello, may I speak with Pam Clayton?    My name is INÉS      I am  with [facility]  [facility]  [address]  [place] [address] [phone].    Before we get started may I verify your date of birth? 1945    I am calling to officially welcome you to our practice and ask about your recent visit. Is this a good time to talk? yes    Tell me about your visit with us. What things went well?  EVEN THOUGH I'M NOT A NEW PATIENT, I JUST LOVE DR MYRICK AND HATE TO SEE HIM GO, BUT I UNDERSTAND FAMILY COMES FIRST. DR COBB IS VERY NICE AS WELL. THANK Y'ALL FOR ALWAYS GETTING ME IN AND TAKING SUCH GOOD CARE OF ME. THANK YOU INÉS FOR TAKING MY  DOWNSTAIRS LAST WK FOR ME SINCE I WAS TOO WEAK TO PUSH HIM. THAT MEANT SO MUCH TO US.        We're always looking for ways to make our patients' experiences even better. Do you have recommendations on ways we may improve?  no    Overall were you satisfied with your first visit to our practice? yes       I appreciate you taking the time to speak with me today. Is there anything else I can do for you? no      Thank you, and have a great day.

## 2023-08-29 NOTE — PROGRESS NOTES
"Patient: Pam Clayton    YOB: 1945    Date: 08/29/2023    Primary Care Provider: Hung Nicole MD    Vital Signs:   Vitals:    08/29/23 0931   BP: 143/60   BP Location: Left arm   Patient Position: Sitting   Cuff Size: Adult   Weight: 67.1 kg (147 lb 14.9 oz)   Height: 162.6 cm (64.02\")       I aspirated 60 cc of slight tinged serous fluid from her operative bed.  She is to follow-up with my partner Dr. Martinez who saw her last week for ongoing follow-up.  She is in the capable hands of Dr. Holland for oncological follow-up    Results Review:   I reviewed the patient's new clinical results.        Assessment / Plan:    There are no diagnoses linked to this encounter.          Electronically signed by Tonia Ledezma MD  08/29/23  11:00 CDT                  "

## 2023-09-06 ENCOUNTER — OFFICE VISIT (OUTPATIENT)
Dept: SURGERY | Facility: CLINIC | Age: 78
End: 2023-09-06
Payer: MEDICARE

## 2023-09-06 ENCOUNTER — TRANSCRIBE ORDERS (OUTPATIENT)
Dept: ADMINISTRATIVE | Facility: HOSPITAL | Age: 78
End: 2023-09-06
Payer: MEDICARE

## 2023-09-06 ENCOUNTER — LAB (OUTPATIENT)
Dept: LAB | Facility: HOSPITAL | Age: 78
End: 2023-09-06
Payer: MEDICARE

## 2023-09-06 VITALS
HEIGHT: 64 IN | SYSTOLIC BLOOD PRESSURE: 149 MMHG | DIASTOLIC BLOOD PRESSURE: 80 MMHG | OXYGEN SATURATION: 98 % | WEIGHT: 147 LBS | HEART RATE: 82 BPM | BODY MASS INDEX: 25.1 KG/M2

## 2023-09-06 DIAGNOSIS — N63.21 MASS OF UPPER OUTER QUADRANT OF LEFT BREAST: ICD-10-CM

## 2023-09-06 DIAGNOSIS — E11.69 TYPE 2 DIABETES MELLITUS WITH OTHER SPECIFIED COMPLICATION, UNSPECIFIED WHETHER LONG TERM INSULIN USE: Primary | ICD-10-CM

## 2023-09-06 DIAGNOSIS — E11.69 TYPE 2 DIABETES MELLITUS WITH OTHER SPECIFIED COMPLICATION, UNSPECIFIED WHETHER LONG TERM INSULIN USE: ICD-10-CM

## 2023-09-06 DIAGNOSIS — D09.0 CIS (CARCINOMA IN SITU OF BLADDER): ICD-10-CM

## 2023-09-06 DIAGNOSIS — C50.912 MALIGNANT NEOPLASM OF LEFT FEMALE BREAST, UNSPECIFIED ESTROGEN RECEPTOR STATUS, UNSPECIFIED SITE OF BREAST: Primary | ICD-10-CM

## 2023-09-06 DIAGNOSIS — R89.6 ABNORMAL CYTOLOGY: ICD-10-CM

## 2023-09-06 LAB
ALBUMIN SERPL-MCNC: 4.6 G/DL (ref 3.5–5.2)
ALBUMIN/GLOB SERPL: 1.6 G/DL
ALP SERPL-CCNC: 102 U/L (ref 39–117)
ALT SERPL W P-5'-P-CCNC: 8 U/L (ref 1–33)
ANION GAP SERPL CALCULATED.3IONS-SCNC: 11 MMOL/L (ref 5–15)
AST SERPL-CCNC: 14 U/L (ref 1–32)
BILIRUB SERPL-MCNC: 0.3 MG/DL (ref 0–1.2)
BUN SERPL-MCNC: 11 MG/DL (ref 8–23)
BUN/CREAT SERPL: 16.9 (ref 7–25)
CALCIUM SPEC-SCNC: 9.8 MG/DL (ref 8.6–10.5)
CHLORIDE SERPL-SCNC: 104 MMOL/L (ref 98–107)
CO2 SERPL-SCNC: 26 MMOL/L (ref 22–29)
CREAT SERPL-MCNC: 0.65 MG/DL (ref 0.57–1)
EGFRCR SERPLBLD CKD-EPI 2021: 91.4 ML/MIN/1.73
GLOBULIN UR ELPH-MCNC: 2.8 GM/DL
GLUCOSE SERPL-MCNC: 139 MG/DL (ref 65–99)
HBA1C MFR BLD: 7.3 % (ref 4.8–5.6)
POTASSIUM SERPL-SCNC: 4.5 MMOL/L (ref 3.5–5.2)
PROT SERPL-MCNC: 7.4 G/DL (ref 6–8.5)
SODIUM SERPL-SCNC: 141 MMOL/L (ref 136–145)

## 2023-09-06 PROCEDURE — 36415 COLL VENOUS BLD VENIPUNCTURE: CPT

## 2023-09-06 PROCEDURE — 83036 HEMOGLOBIN GLYCOSYLATED A1C: CPT

## 2023-09-06 PROCEDURE — 80053 COMPREHEN METABOLIC PANEL: CPT

## 2023-09-06 NOTE — PROGRESS NOTES
Office Established Patient Note:     Referring Provider: Dr. Hung Nicole    Chief complaint 5 weeks status post mastectomy with seroma.    Subjective .     History of present illness: Pam Clayton is a 78 y.o. female currently 5 weeks out from a left mastectomy with a seroma present, she was aspirated earlier this week and she is here for follow-up complaining of fluid, we will evaluate the area and aspirate as needed.  She saw Brisa last week and he has been aspirated 60 cc out I saw 2 weeks ago and aspirated approximately 100 cc out.  She is back in the office now she states that she has been aspirated at least 5 times, she is frustrated with the situation, she states that her  is also has terminal cancer and she is frustrated with this fluid and also the need for reaspiration.         Component    Note to Patients    This report may contain a detailed description of human tissue sent by a health care provider to the laboratory for pathologic evaluation. The content of this report is essential for diagnosis and may provide important critical findings. This information may be unfamiliar to patients to review without a medical professional present. It is advised that the patient review this report in the presence of a health care provider who can answer questions and explain the results.   Case Report   Surgical Pathology Report                         Case: [id number]                                   Authorizing Provider:  Tonia Ledezma MD        Collected:           08/01/2023 03:56 PM           [facility]  Received:            08/02/2023 08:07 AM           Pathologist:           Clare Suh MD                                                         Specimens:   1) - Breast, Left, left breast                                                                      2) - [place] Lymph Node, SENTINEL NODES WITH LYMPH TISSUE                                 Final Diagnosis   1.  Left breast, mastectomy:  A.  Invasive lobular carcinoma, grade 1, identified at prior biopsy site (18 mm).  B.  Associated lobular carcinoma in situ.  C.  Focal atypical lobular hyperplasia identified in random section from upper outer quadrant of breast.  D.  No evidence of Paget's disease of nipple.  E.  Skin and closest inked (posterior) surgical margin are negative for malignancy.  F.  Prior biopsy site changes including fibrosis and fat necrosis.  G.  Columnar cell changes without atypia.  H.  Usual ductal hyperplasia.  I.  Fibrocystic changes of breast with papillary apocrine change.  J.  Medial calcifications identified in mammary vasculature.  K.  Seborrheic keratoses, skin of breast.     2.  Left axillary sentinel lymph nodes:  A. Four of 4 lymph nodes are negative for metastatic carcinoma.  B.  Absence of micrometastases is confirmed utilizing immunohistochemical stains for pankeratin.     AJCC stage: pT1c pN0        History  Past Medical History:   Diagnosis Date    Arthritis     Bladder cancer     Bladder cancer    Diabetes mellitus     GERD (gastroesophageal reflux disease)     Hard of hearing     Hx of excision of tumor of brain meninges     Hyperlipidemia     Hypertension     UTI (urinary tract infection)     Vertigo    ,   Past Surgical History:   Procedure Laterality Date    APPENDECTOMY      BRAIN TUMOR EXCISION      CHOLECYSTECTOMY      CYSTOSCOPY BLADDER BIOPSY N/A 7/25/2018    Procedure: CYSTOSCOPY BILATERAL RETROGRADE PYELOGRAM BLADDER BIOPSIES WITH FULGURATION;  Surgeon: Rj Tijerina MD;  Location: [facility] OR;  Service: Urology    CYSTOSCOPY BLADDER BIOPSY N/A 10/31/2018    Procedure: CYSTOSCOPY BLADDER BIOPSY WITH FULGURATION;  Surgeon: Rj Tijerina MD;  Location:  [facility] OR;  Service: Urology    HYSTERECTOMY      MASTECTOMY W/ SENTINEL NODE BIOPSY Left 8/1/2023    Procedure: BREAST MASTECTOMY WITH MAG SEED GUIDED AND SENTINEL NODE BIOPSY;  Surgeon:  "Tonia Ledezma MD;  Location:  [facility] OR;  Service: General;  Laterality: Left;    TOTAL KNEE ARTHROPLASTY Bilateral    ,   Family History   Problem Relation Age of Onset    No Known Problems Father     Cancer Mother     Diabetes Mother     Heart disease Mother    ,   Social History     Tobacco Use    Smoking status: Never    Smokeless tobacco: Never   Vaping Use    Vaping Use: Never used   Substance Use Topics    Alcohol use: Yes     Comment: \"One drink a year\"    Drug use: No   , (Not in a hospital admission)   and Allergies:  Halothane, Atorvastatin, Other, Cortisone, Lisinopril, Metformin, Mometasone furoate, Sulfa antibiotics, Tramadol, Codeine, Cortisol manager [cholestatin], Dexlansoprazole, Levaquin [levofloxacin], and Phenytoin sodium extended    Current Outpatient Medications:     albuterol sulfate  (90 Base) MCG/ACT inhaler, Inhale 1 puff Every 4 (Four) Hours As Needed for Shortness of Air or Wheezing., Disp: , Rfl:     Cetirizine HCl 10 MG capsule, Take 1 tablet by mouth Daily., Disp: , Rfl:     Coenzyme Q10 10 MG capsule, Take 400 mg by mouth Daily., Disp: , Rfl:     Collagen-Vitamin C-Biotin (Collagen 1500/C) 500-50-0.8 MG capsule, Take 3 tablets by mouth Daily., Disp: , Rfl:     esomeprazole (nexIUM) 40 MG capsule, Take 1 capsule by mouth Every Morning Before Breakfast., Disp: , Rfl:     glipiZIDE (GLUCOTROL XL) 5 MG ER tablet, Take 1 tablet by mouth Daily., Disp: , Rfl: 2    glucosamine-chondroitin 500-400 MG capsule capsule, Take 1 capsule by mouth 2 (Two) Times a Day., Disp: , Rfl:     HYDROcodone-acetaminophen (NORCO) 7.5-325 MG per tablet, Take 1 tablet by mouth Every 4 (Four) Hours As Needed for Moderate Pain (Pain)., Disp: 20 tablet, Rfl: 0    insulin detemir (LEVEMIR) 100 UNIT/ML injection, Inject 48 Units under the skin into the appropriate area as directed Every Night., Disp: , Rfl:     insulin glargine (LANTUS, SEMGLEE) 100 UNIT/ML injection, Inject 48 Units under the skin into the " appropriate area as directed Daily. Every night, Disp: , Rfl:     insulin lispro protamine-insulin lispro (humaLOG 50-50) (50-50) 100 UNIT/ML suspension injection, Inject 6 Units under the skin into the appropriate area as directed 3 (Three) Times a Day., Disp: , Rfl:     Lactobacillus-Inulin ([facility] [employer]) capsule, Take 1 tablet by mouth Daily., Disp: , Rfl:     losartan (COZAAR) 50 MG tablet, Take 1 tablet by mouth Daily., Disp: , Rfl: 1    metFORMIN ER (GLUCOPHAGE-XR) 500 MG 24 hr tablet, Take 1 tablet by mouth Daily., Disp: 30 tablet, Rfl: 3    metoprolol succinate XL (TOPROL-XL) 100 MG 24 hr tablet, Take 0.5 tablets by mouth Every Night., Disp: , Rfl:     MULTIPLE VITAMIN PO, Take 1 tablet by mouth Daily., Disp: , Rfl:     rivaroxaban (Xarelto) 10 MG tablet, Take 1 tablet by mouth Daily., Disp: 14 tablet, Rfl: 0    Current Facility-Administered Medications:     lidocaine (XYLOCAINE) 1 % injection 10 mL, 10 mL, Subcutaneous, Once, Sarai Gauthier MD    lidocaine 1% - EPINEPHrine 1:108130 (XYLOCAINE W/EPI) 1 %-1:325782 injection 10 mL, 10 mL, Injection, Once, Sarai Gauthier MD    Objective     Vital Signs   There were no vitals taken for this visit.     Physical Exam:  Respirations clear and equal    The left chest has a seroma present relatively sizable the incision is clean and dry there is no sign of any infection.  The area was cleaned and prepped and aspirated 200 cc of straw-colored fluid with full resolution of the cavity.    Results Review:  Result Review :  Lab Results   Component Value Date    FINALDX  08/01/2023     1.  Left breast, mastectomy:  A.  Invasive lobular carcinoma, grade 1, identified at prior biopsy site (18 mm).  B.  Associated lobular carcinoma in situ.  C.  Focal atypical lobular hyperplasia identified in random section from upper outer quadrant of breast.  D.  No evidence of Paget's disease of nipple.  E.  Skin and closest inked (posterior) surgical margin are  "negative for malignancy.  F.  Prior biopsy site changes including fibrosis and fat necrosis.  G.  Columnar cell changes without atypia.  H.  Usual ductal hyperplasia.  I.  Fibrocystic changes of breast with papillary apocrine change.  J.  Medial calcifications identified in mammary vasculature.  K.  Seborrheic keratoses, skin of breast.    2.  Left axillary sentinel lymph nodes:  A. Four of 4 lymph nodes are negative for metastatic carcinoma.  B.  Absence of micrometastases is confirmed utilizing immunohistochemical stains for pankeratin.    AJCC stage: pT1c pN0      GROSSDES  08/01/2023     1. Breast, Left.   Received in a formalin filled container labeled with the patient's name, date of birth, and \"left breast\".  The specimen is received after hours on 8/1 and processed on the morning of 8/2.  The specimen has a cold ischemic time of 35 minutes and a formalin fixation time of 28-1/2 hours.  The specimen consists of a left breast measuring 18.5 cm medial to lateral by 14.7 cm superior to inferior by 3.3 cm anterior to posterior and weighing 671 g.  The attached skin ellipse measures 22.5 x 15.3 cm.  The skin surface is marked by multiple tan-brown, slightly raised lesions mostly confined to the inferior-medial quadrant and ranging from 0.2 cm to 0.8 cm in greatest dimension.  The nipple appears unremarkable.  Sectioning through the lesions shows superficial thickening and are otherwise unremarkable.  The posterior margin is yellow-tan, mostly flattened and intact.  The posterior margin is inked black.  Sectioning reveals a jelly filled biopsy cavity in the central 1:00 aspect of the breast.  The biopsy cavity measures 1.4 cm in length by 0.5 cm in diameter.  The biopsy clip is identified within the gelatinous plug.  The biopsy cavity measures 3.5 cm from the posterior margin.  Surrounding the biopsy cavity is a slightly more stellate yellow to gray mass measuring 1.3 x 1.1 x 1.1 cm.  The soft mass measures 3.1 cm " from the posterior margin, 5.5 cm from the superior margin, 8.3 cm from the inferior margin, 9.7 cm from the lateral margin, 5.7 cm from the medial margin, 4.8 cm from the skin, and 5.5 cm superior-lateral to the nipple.  Sectioning through the remaining breast reveals yellow-pink, soft and lobulated fibroadipose tissue with thin fibrous septae and evidence of fibrocystic change.  1A-perpendicular section of nipple  1B-representative sections of skin lesions  1C-representative section of unremarkable skin  1D-perpendicular section of posterior margin adjacent to mass  1E through 1H-representative sections of biopsy cavity and mass  1I-random upper outer quadrant  1J-random lower outer quadrant  1K-random upper inner quadrant  1L-random lower inner quadrant    2. [place] Lymph Node.   Received in a formalin filled container labeled with the patient's name, date of birth, and designated sentinel lymph node-left axilla.  The specimen consists of 2 fragments of yellow-pink soft tissue aggregating to 4.5 x 3.5 x 1.4 cm.  Dissection reveals multiple lymph node candidates measuring up to 2.9 cm.  The cut surface of the largest lymph node candidate exhibits 30% fatty replacement with green dye uptake.  The cut surface of the remaining lymph node candidates are unremarkable.  2A through 2D-largest lymph node candidate  2E and 2F-bisected lymph node candidate  2G-1 bisected and 1 intact lymph node candidate                Assessment & Plan       Patient status post left mastectomy invasive lobular carcinoma, 18 mm with 0 of 4 lymph nodes, currently with a seroma present, we will aspirate her several more times, I will see her weekly, in the meantime I will try her on every other day Lasix 20 mg every other day I will see her back in 1 week we will assess which way we are going with this fluid if this fluid output continues we may need to place a drain but at present we will continue with aspirations and follow-up with me in  1 week we will try the Lasix to help reduce any fluid excess.    Discussed BMI elevation and need to move toward a reduced BMI for health concerns she appears to understand she is a non smoker and her meds were reviewed.      Julio Preston MD  09/06/23  07:42 CDT

## 2023-09-06 NOTE — PATIENT INSTRUCTIONS
Please take the Lasix every other day 20 mg for the next week I will see you back next Wednesday we will reassess the chest wall at that point.

## 2023-09-12 NOTE — PROGRESS NOTES
Office Established Patient Note:     Referring Provider: Dr. Hung Nicole    Chief complaint 6 weeks out from mastectomy with seroma    Subjective .     History of present illness: Pam Clayton is a 78 y.o. female currently 5 weeks out from a left mastectomy with a seroma present, she was aspirated earlier this week and she is here for follow-up complaining of fluid, we will evaluate the area and aspirate as needed.  She saw Brisa last week and he has been aspirated 60 cc out I saw 2 weeks ago and aspirated approximately 100 cc out.  She is back in the office now she states that she has been aspirated at least 5 times, she is frustrated with the situation, she states that her  is also has terminal cancer and she is frustrated with this fluid and also the need for reaspiration.      She is currently here for follow-up her  is in the hospital with his terminal cancer, she is here for follow-up of her seroma from her left chest from Dr Dixon's mastectomy.           Component     Note to Patients     This report may contain a detailed description of human tissue sent by a health care provider to the laboratory for pathologic evaluation. The content of this report is essential for diagnosis and may provide important critical findings. This information may be unfamiliar to patients to review without a medical professional present. It is advised that the patient review this report in the presence of a health care provider who can answer questions and explain the results.   Case Report   Surgical Pathology Report                         Case: [id number]                                   Authorizing Provider:  Tonia Ledezma MD        Collected:           08/01/2023 03:56 PM           Ordering Location:     [facility] OR  Received:            08/02/2023 08:07 AM           Pathologist:           Clare Suh MD                                                         Specimens:   1) -  Breast, Left, left breast                                                                      2) - [place] Lymph Node, SENTINEL NODES WITH LYMPH TISSUE                                 Final Diagnosis   1.  Left breast, mastectomy:  A.  Invasive lobular carcinoma, grade 1, identified at prior biopsy site (18 mm).  B.  Associated lobular carcinoma in situ.  C.  Focal atypical lobular hyperplasia identified in random section from upper outer quadrant of breast.  D.  No evidence of Paget's disease of nipple.  E.  Skin and closest inked (posterior) surgical margin are negative for malignancy.  F.  Prior biopsy site changes including fibrosis and fat necrosis.  G.  Columnar cell changes without atypia.  H.  Usual ductal hyperplasia.  I.  Fibrocystic changes of breast with papillary apocrine change.  J.  Medial calcifications identified in mammary vasculature.  K.  Seborrheic keratoses, skin of breast.     2.  Left axillary sentinel lymph nodes:  A. Four of 4 lymph nodes are negative for metastatic carcinoma.  B.  Absence of micrometastases is confirmed utilizing immunohistochemical stains for pankeratin.     AJCC stage: pT1c pN0           History  Past Medical History:   Diagnosis Date    Arthritis     Bladder cancer     Bladder cancer    Diabetes mellitus     GERD (gastroesophageal reflux disease)     Hard of hearing     Hx of excision of tumor of brain meninges     Hyperlipidemia     Hypertension     UTI (urinary tract infection)     Vertigo    ,   Past Surgical History:   Procedure Laterality Date    APPENDECTOMY      BRAIN TUMOR EXCISION      CHOLECYSTECTOMY      CYSTOSCOPY BLADDER BIOPSY N/A 7/25/2018    Procedure: CYSTOSCOPY BILATERAL RETROGRADE PYELOGRAM BLADDER BIOPSIES WITH FULGURATION;  Surgeon: Rj Tijerina MD;  Location: [facility] OR;  Service: Urology    CYSTOSCOPY BLADDER BIOPSY N/A 10/31/2018    Procedure: CYSTOSCOPY BLADDER BIOPSY WITH FULGURATION;  Surgeon: Rj Tijerina MD;  Location: [facility]  "OR;  Service: Urology    HYSTERECTOMY      MASTECTOMY W/ SENTINEL NODE BIOPSY Left 8/1/2023    Procedure: BREAST MASTECTOMY WITH MAG SEED GUIDED AND SENTINEL NODE BIOPSY;  Surgeon: Tonia Ledezma MD;  Location:  [facility] OR;  Service: General;  Laterality: Left;    TOTAL KNEE ARTHROPLASTY Bilateral    ,   Family History   Problem Relation Age of Onset    No Known Problems Father     Cancer Mother     Diabetes Mother     Heart disease Mother    ,   Social History     Tobacco Use    Smoking status: Never    Smokeless tobacco: Never   Vaping Use    Vaping Use: Never used   Substance Use Topics    Alcohol use: Yes     Comment: \"One drink a year\"    Drug use: No   , (Not in a hospital admission)   and Allergies:  Halothane, Atorvastatin, Other, Cortisone, Lisinopril, Metformin, Mometasone furoate, Sulfa antibiotics, Tramadol, Codeine, Cortisol manager [cholestatin], Dexlansoprazole, Levaquin [levofloxacin], and Phenytoin sodium extended    Current Outpatient Medications:     albuterol sulfate  (90 Base) MCG/ACT inhaler, Inhale 1 puff Every 4 (Four) Hours As Needed for Shortness of Air or Wheezing., Disp: , Rfl:     Cetirizine HCl 10 MG capsule, Take 1 tablet by mouth Daily., Disp: , Rfl:     Coenzyme Q10 10 MG capsule, Take 400 mg by mouth Daily., Disp: , Rfl:     Collagen-Vitamin C-Biotin (Collagen 1500/C) 500-50-0.8 MG capsule, Take 3 tablets by mouth Daily., Disp: , Rfl:     esomeprazole (nexIUM) 40 MG capsule, Take 1 capsule by mouth Every Morning Before Breakfast., Disp: , Rfl:     glipiZIDE (GLUCOTROL XL) 5 MG ER tablet, Take 1 tablet by mouth Daily., Disp: , Rfl: 2    glucosamine-chondroitin 500-400 MG capsule capsule, Take 1 capsule by mouth 2 (Two) Times a Day., Disp: , Rfl:     HYDROcodone-acetaminophen (NORCO) 7.5-325 MG per tablet, Take 1 tablet by mouth Every 4 (Four) Hours As Needed for Moderate Pain (Pain)., Disp: 20 tablet, Rfl: 0    insulin detemir (LEVEMIR) 100 UNIT/ML injection, Inject 48 Units " under the skin into the appropriate area as directed Every Night., Disp: , Rfl:     insulin glargine (LANTUS, SEMGLEE) 100 UNIT/ML injection, Inject 48 Units under the skin into the appropriate area as directed Daily. Every night, Disp: , Rfl:     insulin lispro protamine-insulin lispro (humaLOG 50-50) (50-50) 100 UNIT/ML suspension injection, Inject 6 Units under the skin into the appropriate area as directed 3 (Three) Times a Day., Disp: , Rfl:     Lactobacillus-Inulin ([facility]) capsule, Take 1 tablet by mouth Daily., Disp: , Rfl:     losartan (COZAAR) 50 MG tablet, Take 1 tablet by mouth Daily., Disp: , Rfl: 1    metFORMIN ER (GLUCOPHAGE-XR) 500 MG 24 hr tablet, Take 1 tablet by mouth Daily., Disp: 30 tablet, Rfl: 3    metoprolol succinate XL (TOPROL-XL) 100 MG 24 hr tablet, Take 0.5 tablets by mouth Every Night., Disp: , Rfl:     MULTIPLE VITAMIN PO, Take 1 tablet by mouth Daily., Disp: , Rfl:     Current Facility-Administered Medications:     lidocaine (XYLOCAINE) 1 % injection 10 mL, 10 mL, Subcutaneous, Once, Sarai Gauthier MD    lidocaine 1% - EPINEPHrine 1:432018 (XYLOCAINE W/EPI) 1 %-1:292059 injection 10 mL, 10 mL, Injection, Once, Sarai Gauthier MD    Objective     Vital Signs   There were no vitals taken for this visit.     Physical Exam:  With treatment with Lasix 20 mg every other day she has had reduced output, the area was cleaned and prepped and 90 cc of straw-colored fluid was removed, full resolution of the fluid was accomplished.    Results Review:  Result Review :  Lab Results   Component Value Date    FINALDX  08/01/2023     1.  Left breast, mastectomy:  A.  Invasive lobular carcinoma, grade 1, identified at prior biopsy site (18 mm).  B.  Associated lobular carcinoma in situ.  C.  Focal atypical lobular hyperplasia identified in random section from upper outer quadrant of breast.  D.  No evidence of Paget's disease of nipple.  E.  Skin and closest inked  "(posterior) surgical margin are negative for malignancy.  F.  Prior biopsy site changes including fibrosis and fat necrosis.  G.  Columnar cell changes without atypia.  H.  Usual ductal hyperplasia.  I.  Fibrocystic changes of breast with papillary apocrine change.  J.  Medial calcifications identified in mammary vasculature.  K.  Seborrheic keratoses, skin of breast.    2.  Left axillary sentinel lymph nodes:  A. Four of 4 lymph nodes are negative for metastatic carcinoma.  B.  Absence of micrometastases is confirmed utilizing immunohistochemical stains for pankeratin.    AJCC stage: pT1c pN0      GROSSDES  08/01/2023     1. Breast, Left.   Received in a formalin filled container labeled with the patient's name, date of birth, and \"left breast\".  The specimen is received after hours on 8/1 and processed on the morning of 8/2.  The specimen has a cold ischemic time of 35 minutes and a formalin fixation time of 28-1/2 hours.  The specimen consists of a left breast measuring 18.5 cm medial to lateral by 14.7 cm superior to inferior by 3.3 cm anterior to posterior and weighing 671 g.  The attached skin ellipse measures 22.5 x 15.3 cm.  The skin surface is marked by multiple tan-brown, slightly raised lesions mostly confined to the inferior-medial quadrant and ranging from 0.2 cm to 0.8 cm in greatest dimension.  The nipple appears unremarkable.  Sectioning through the lesions shows superficial thickening and are otherwise unremarkable.  The posterior margin is yellow-tan, mostly flattened and intact.  The posterior margin is inked black.  Sectioning reveals a jelly filled biopsy cavity in the central 1:00 aspect of the breast.  The biopsy cavity measures 1.4 cm in length by 0.5 cm in diameter.  The biopsy clip is identified within the gelatinous plug.  The biopsy cavity measures 3.5 cm from the posterior margin.  Surrounding the biopsy cavity is a slightly more stellate yellow to gray mass measuring 1.3 x 1.1 x 1.1 cm. "  The soft mass measures 3.1 cm from the posterior margin, 5.5 cm from the superior margin, 8.3 cm from the inferior margin, 9.7 cm from the lateral margin, 5.7 cm from the medial margin, 4.8 cm from the skin, and 5.5 cm superior-lateral to the nipple.  Sectioning through the remaining breast reveals yellow-pink, soft and lobulated fibroadipose tissue with thin fibrous septae and evidence of fibrocystic change.  1A-perpendicular section of nipple  1B-representative sections of skin lesions  1C-representative section of unremarkable skin  1D-perpendicular section of posterior margin adjacent to mass  1E through 1H-representative sections of biopsy cavity and mass  1I-random upper outer quadrant  1J-random lower outer quadrant  1K-random upper inner quadrant  1L-random lower inner quadrant    2. [place] Lymph Node.   Received in a formalin filled container labeled with the patient's name, date of birth, and designated sentinel lymph node-left axilla.  The specimen consists of 2 fragments of yellow-pink soft tissue aggregating to 4.5 x 3.5 x 1.4 cm.  Dissection reveals multiple lymph node candidates measuring up to 2.9 cm.  The cut surface of the largest lymph node candidate exhibits 30% fatty replacement with green dye uptake.  The cut surface of the remaining lymph node candidates are unremarkable.  2A through 2D-largest lymph node candidate  2E and 2F-bisected lymph node candidate  2G-1 bisected and 1 intact lymph node candidate         , Repeat aspiration, continue every other day Lasix, follow-up with me in 1 week.  Assessment & Plan       Status post mastectomy with postoperative seroma, I will see her back in 1 week for repeat evaluation    Discussed BMI elevation and need to move toward a reduced BMI for health concerns she appears to understand she is a non smoker and her meds were reviewed.      Julio Preston MD  09/12/23  11:17 CDT

## 2023-09-13 ENCOUNTER — OFFICE VISIT (OUTPATIENT)
Dept: SURGERY | Facility: CLINIC | Age: 78
End: 2023-09-13
Payer: MEDICARE

## 2023-09-13 VITALS
WEIGHT: 147.05 LBS | BODY MASS INDEX: 25.1 KG/M2 | DIASTOLIC BLOOD PRESSURE: 80 MMHG | HEIGHT: 64 IN | SYSTOLIC BLOOD PRESSURE: 149 MMHG | HEART RATE: 82 BPM

## 2023-09-13 DIAGNOSIS — C50.912 MALIGNANT NEOPLASM OF LEFT FEMALE BREAST, UNSPECIFIED ESTROGEN RECEPTOR STATUS, UNSPECIFIED SITE OF BREAST: Primary | ICD-10-CM

## 2023-09-13 NOTE — PATIENT INSTRUCTIONS
We will see you back in 1 week continue every other day Lasix I will see you back in a week to reassess the output from the cavity.

## 2023-09-20 ENCOUNTER — OFFICE VISIT (OUTPATIENT)
Dept: SURGERY | Facility: CLINIC | Age: 78
End: 2023-09-20
Payer: MEDICARE

## 2023-09-20 VITALS
BODY MASS INDEX: 25.1 KG/M2 | WEIGHT: 147 LBS | OXYGEN SATURATION: 97 % | DIASTOLIC BLOOD PRESSURE: 78 MMHG | HEIGHT: 64 IN | SYSTOLIC BLOOD PRESSURE: 139 MMHG | HEART RATE: 75 BPM

## 2023-09-20 DIAGNOSIS — C50.912 MALIGNANT NEOPLASM OF LEFT FEMALE BREAST, UNSPECIFIED ESTROGEN RECEPTOR STATUS, UNSPECIFIED SITE OF BREAST: Primary | ICD-10-CM

## 2023-09-20 NOTE — PATIENT INSTRUCTIONS
Please obtain a wide band sports bra all 2 but constant pressure along the chest hopefully to reduce in the seroma occurrences.

## 2023-09-20 NOTE — PROGRESS NOTES
Office Established Patient Note:     Referring Provider: Dr. Hung Nicole    Chief complaint 7 weeks out from mastectomy with recurrent seroma here for weekly follow-up and aspiration    Subjective .     History of present illness:  Pam Clayton is a 78 y.o. female currently 5 weeks out from a left mastectomy with a seroma present, she was aspirated earlier this week and she is here for follow-up complaining of fluid, we will evaluate the area and aspirate as needed.  She saw Brisa last week and he has been aspirated 60 cc out I saw 2 weeks ago and aspirated approximately 100 cc out.  She is back in the office now she states that she has been aspirated at least 5 times, she is frustrated with the situation, she states that her  is also has terminal cancer and she is frustrated with this fluid and also the need for reaspiration.        She is currently here for follow-up her  is in the hospital with his terminal cancer, she is here for follow-up of her seroma from her left chest from Dr Dixon's mastectomy.           Component     Note to Patients     This report may contain a detailed description of human tissue sent by a health care provider to the laboratory for pathologic evaluation. The content of this report is essential for diagnosis and may provide important critical findings. This information may be unfamiliar to patients to review without a medical professional present. It is advised that the patient review this report in the presence of a health care provider who can answer questions and explain the results.   Case Report   Surgical Pathology Report                         Case: [id number]                                   Authorizing Provider:  Tonia Ledezma MD        Collected:           08/01/2023 03:56 PM           Ordering Location:     [facility] OR  Received:            08/02/2023 08:07 AM           Pathologist:           Clare Suh MD                                                          Specimens:   1) - Breast, Left, left breast                                                                      2) - [place] Lymph Node, SENTINEL NODES WITH LYMPH TISSUE                                 Final Diagnosis   1.  Left breast, mastectomy:  A.  Invasive lobular carcinoma, grade 1, identified at prior biopsy site (18 mm).  B.  Associated lobular carcinoma in situ.  C.  Focal atypical lobular hyperplasia identified in random section from upper outer quadrant of breast.  D.  No evidence of Paget's disease of nipple.  E.  Skin and closest inked (posterior) surgical margin are negative for malignancy.  F.  Prior biopsy site changes including fibrosis and fat necrosis.  G.  Columnar cell changes without atypia.  H.  Usual ductal hyperplasia.  I.  Fibrocystic changes of breast with papillary apocrine change.  J.  Medial calcifications identified in mammary vasculature.  K.  Seborrheic keratoses, skin of breast.     2.  Left axillary sentinel lymph nodes:  A. Four of 4 lymph nodes are negative for metastatic carcinoma.  B.  Absence of micrometastases is confirmed utilizing immunohistochemical stains for pankeratin.     AJCC stage: pT1c pN0         Multiple questions were asked again why the seroma is occurring and when will it stop I could not tell her any time we had reduced it some it is averaging approximately 90 cc every week but there has been no significant reduction.  History  Past Medical History:   Diagnosis Date    Arthritis     Bladder cancer     Bladder cancer    Diabetes mellitus     GERD (gastroesophageal reflux disease)     Hard of hearing     Hx of excision of tumor of brain meninges     Hyperlipidemia     Hypertension     UTI (urinary tract infection)     Vertigo    ,   Past Surgical History:   Procedure Laterality Date    APPENDECTOMY      BRAIN TUMOR EXCISION      CHOLECYSTECTOMY      CYSTOSCOPY BLADDER BIOPSY N/A 7/25/2018    Procedure: CYSTOSCOPY BILATERAL  "RETROGRADE PYELOGRAM BLADDER BIOPSIES WITH FULGURATION;  Surgeon: Rj Tijerina MD;  Location:  [facility] OR;  Service: Urology    CYSTOSCOPY BLADDER BIOPSY N/A 10/31/2018    Procedure: CYSTOSCOPY BLADDER BIOPSY WITH FULGURATION;  Surgeon: Rj Tijerina MD;  Location:  [facility] OR;  Service: Urology    HYSTERECTOMY      MASTECTOMY W/ SENTINEL NODE BIOPSY Left 8/1/2023    Procedure: BREAST MASTECTOMY WITH MAG SEED GUIDED AND SENTINEL NODE BIOPSY;  Surgeon: Tonia Ledezma MD;  Location:  [facility] OR;  Service: General;  Laterality: Left;    TOTAL KNEE ARTHROPLASTY Bilateral    ,   Family History   Problem Relation Age of Onset    No Known Problems Father     Cancer Mother     Diabetes Mother     Heart disease Mother    ,   Social History     Tobacco Use    Smoking status: Never    Smokeless tobacco: Never   Vaping Use    Vaping Use: Never used   Substance Use Topics    Alcohol use: Yes     Comment: \"One drink a year\"    Drug use: No   , (Not in a hospital admission)   and Allergies:  Halothane, Atorvastatin, Other, Cortisone, Lisinopril, Metformin, Mometasone furoate, Sulfa antibiotics, Tramadol, Codeine, Cortisol manager [cholestatin], Dexlansoprazole, Levaquin [levofloxacin], and Phenytoin sodium extended    Current Outpatient Medications:     albuterol sulfate  (90 Base) MCG/ACT inhaler, Inhale 1 puff Every 4 (Four) Hours As Needed for Shortness of Air or Wheezing., Disp: , Rfl:     Cetirizine HCl 10 MG capsule, Take 1 tablet by mouth Daily., Disp: , Rfl:     Coenzyme Q10 10 MG capsule, Take 400 mg by mouth Daily., Disp: , Rfl:     Collagen-Vitamin C-Biotin (Collagen 1500/C) 500-50-0.8 MG capsule, Take 3 tablets by mouth Daily., Disp: , Rfl:     esomeprazole (nexIUM) 40 MG capsule, Take 1 capsule by mouth Every Morning Before Breakfast., Disp: , Rfl:     glipiZIDE (GLUCOTROL XL) 5 MG ER tablet, Take 1 tablet by mouth Daily., Disp: , Rfl: 2    glucosamine-chondroitin 500-400 MG capsule capsule, Take 1 " capsule by mouth 2 (Two) Times a Day., Disp: , Rfl:     HYDROcodone-acetaminophen (NORCO) 7.5-325 MG per tablet, Take 1 tablet by mouth Every 4 (Four) Hours As Needed for Moderate Pain (Pain)., Disp: 20 tablet, Rfl: 0    insulin detemir (LEVEMIR) 100 UNIT/ML injection, Inject 48 Units under the skin into the appropriate area as directed Every Night., Disp: , Rfl:     insulin glargine (LANTUS, SEMGLEE) 100 UNIT/ML injection, Inject 48 Units under the skin into the appropriate area as directed Daily. Every night, Disp: , Rfl:     insulin lispro protamine-insulin lispro (humaLOG 50-50) (50-50) 100 UNIT/ML suspension injection, Inject 6 Units under the skin into the appropriate area as directed 3 (Three) Times a Day., Disp: , Rfl:     Lactobacillus-Inulin ([facility]) capsule, Take 1 tablet by mouth Daily., Disp: , Rfl:     losartan (COZAAR) 50 MG tablet, Take 1 tablet by mouth Daily., Disp: , Rfl: 1    metFORMIN ER (GLUCOPHAGE-XR) 500 MG 24 hr tablet, Take 1 tablet by mouth Daily., Disp: 30 tablet, Rfl: 3    metoprolol succinate XL (TOPROL-XL) 100 MG 24 hr tablet, Take 0.5 tablets by mouth Every Night., Disp: , Rfl:     MULTIPLE VITAMIN PO, Take 1 tablet by mouth Daily., Disp: , Rfl:     Current Facility-Administered Medications:     lidocaine (XYLOCAINE) 1 % injection 10 mL, 10 mL, Subcutaneous, Once, Sarai Gauthier MD    lidocaine 1% - EPINEPHrine 1:[id number] (XYLOCAINE W/EPI) 1 %-1:599421 injection 10 mL, 10 mL, Injection, Once, Sarai Gauthier MD    Objective     Vital Signs   There were no vitals taken for this visit.     Physical Exam:  The left lateral chest was cleaned and draped and aspiration was accomplished 90 cc of fluid was removed straw-colored fluid no further fluid exist, we once again discussed that she has a seroma and I do not know when it will stop I do not have any magic pills about how to stop it other than repeat aspiration we can try to reduce her fluid volume by giving  "Lasix and we will also add something else that being a sports bra to reduce tension and pressure on the chest to hopefully reduce any potential cavity for it and she will even sleep in this.    90 cc aspirated today.    Results Review:  Result Review :  Lab Results   Component Value Date    FINALDX  08/01/2023     1.  Left breast, mastectomy:  A.  Invasive lobular carcinoma, grade 1, identified at prior biopsy site (18 mm).  B.  Associated lobular carcinoma in situ.  C.  Focal atypical lobular hyperplasia identified in random section from upper outer quadrant of breast.  D.  No evidence of Paget's disease of nipple.  E.  Skin and closest inked (posterior) surgical margin are negative for malignancy.  F.  Prior biopsy site changes including fibrosis and fat necrosis.  G.  Columnar cell changes without atypia.  H.  Usual ductal hyperplasia.  I.  Fibrocystic changes of breast with papillary apocrine change.  J.  Medial calcifications identified in mammary vasculature.  K.  Seborrheic keratoses, skin of breast.    2.  Left axillary sentinel lymph nodes:  A. Four of 4 lymph nodes are negative for metastatic carcinoma.  B.  Absence of micrometastases is confirmed utilizing immunohistochemical stains for pankeratin.    AJCC stage: pT1c pN0      GROSSDES  08/01/2023     1. Breast, Left.   Received in a formalin filled container labeled with the patient's name, date of birth, and \"left breast\".  The specimen is received after hours on 8/1 and processed on the morning of 8/2.  The specimen has a cold ischemic time of 35 minutes and a formalin fixation time of 28-1/2 hours.  The specimen consists of a left breast measuring 18.5 cm medial to lateral by 14.7 cm superior to inferior by 3.3 cm anterior to posterior and weighing 671 g.  The attached skin ellipse measures 22.5 x 15.3 cm.  The skin surface is marked by multiple tan-brown, slightly raised lesions mostly confined to the inferior-medial quadrant and ranging from 0.2 cm " to 0.8 cm in greatest dimension.  The nipple appears unremarkable.  Sectioning through the lesions shows superficial thickening and are otherwise unremarkable.  The posterior margin is yellow-tan, mostly flattened and intact.  The posterior margin is inked black.  Sectioning reveals a jelly filled biopsy cavity in the central 1:00 aspect of the breast.  The biopsy cavity measures 1.4 cm in length by 0.5 cm in diameter.  The biopsy clip is identified within the gelatinous plug.  The biopsy cavity measures 3.5 cm from the posterior margin.  Surrounding the biopsy cavity is a slightly more stellate yellow to gray mass measuring 1.3 x 1.1 x 1.1 cm.  The soft mass measures 3.1 cm from the posterior margin, 5.5 cm from the superior margin, 8.3 cm from the inferior margin, 9.7 cm from the lateral margin, 5.7 cm from the medial margin, 4.8 cm from the skin, and 5.5 cm superior-lateral to the nipple.  Sectioning through the remaining breast reveals yellow-pink, soft and lobulated fibroadipose tissue with thin fibrous septae and evidence of fibrocystic change.  1A-perpendicular section of nipple  1B-representative sections of skin lesions  1C-representative section of unremarkable skin  1D-perpendicular section of posterior margin adjacent to mass  1E through 1H-representative sections of biopsy cavity and mass  1I-random upper outer quadrant  1J-random lower outer quadrant  1K-random upper inner quadrant  1L-random lower inner quadrant    2. [place] Lymph Node.   Received in a formalin filled container labeled with the patient's name, date of birth, and designated sentinel lymph node-left axilla.  The specimen consists of 2 fragments of yellow-pink soft tissue aggregating to 4.5 x 3.5 x 1.4 cm.  Dissection reveals multiple lymph node candidates measuring up to 2.9 cm.  The cut surface of the largest lymph node candidate exhibits 30% fatty replacement with green dye uptake.  The cut surface of the remaining lymph node  candidates are unremarkable.  2A through 2D-largest lymph node candidate  2E and 2F-bisected lymph node candidate  2G-1 bisected and 1 intact lymph node candidate                Assessment & Plan     Patient with a left breast seroma minimal signs of resolution so far 90 cc aspirated today we will continue the every other day Lasix we will ask her to get a sports bra all and see if this constant pressure will help reduce the output follow-up with me in 1 week for repeat evaluation    Discussed BMI elevation and need to move toward a reduced BMI for health concerns she appears to understand she is a non smoker and her meds were reviewed.      Julio Preston MD  09/20/23  07:38 CDT

## 2023-09-22 RX ORDER — FUROSEMIDE 20 MG/1
20 TABLET ORAL EVERY OTHER DAY
Qty: 30 TABLET | Refills: 1 | Status: SHIPPED | OUTPATIENT
Start: 2023-09-22 | End: 2023-11-20

## 2023-09-22 RX ORDER — POTASSIUM CHLORIDE 600 MG/1
8 TABLET, FILM COATED, EXTENDED RELEASE ORAL DAILY
Qty: 30 TABLET | Refills: 1 | Status: SHIPPED | OUTPATIENT
Start: 2023-09-22

## 2023-09-26 NOTE — PROGRESS NOTES
Office Established Patient Note:     Referring Provider: Dr. Hung Nicole    Chief complaint 8 weeks out from mastectomy with recurrent seroma here for weekly follow-up and aspiration    Subjective .     History of present illness:  Pam Clayton is a 78 y.o. female currently 5 weeks out from a left mastectomy with a seroma present, she was aspirated earlier this week and she is here for follow-up complaining of fluid, we will evaluate the area and aspirate as needed.  She saw Brisa last week and he has been aspirated 60 cc out I saw 2 weeks ago and aspirated approximately 100 cc out.  She is back in the office now she states that she has been aspirated at least 5 times, she is frustrated with the situation, she states that her  is also has terminal cancer and she is frustrated with this fluid and also the need for reaspiration.        She is currently here for follow-up her  is in the hospital with his terminal cancer, she is here for follow-up of her seroma from her left chest from Dr Dixon's mastectomy.    She is currently 8 weeks out from mastectomy, I am seeing her weekly due to seroma, she is doing much better, the fullness in the left chest is much reduced, and the area was checked and reaspirated today 40 cc 90 cc last week and 180 the day week before.           Component     Note to Patients     This report may contain a detailed description of human tissue sent by a health care provider to the laboratory for pathologic evaluation. The content of this report is essential for diagnosis and may provide important critical findings. This information may be unfamiliar to patients to review without a medical professional present. It is advised that the patient review this report in the presence of a health care provider who can answer questions and explain the results.   Case Report   Surgical Pathology Report                         Case: [id number]                                    Authorizing Provider:  Tonia Ledezma MD        Collected:           08/01/2023 03:56 PM           [facility]  Received:            08/02/2023 08:07 AM           Pathologist:           Clare Suh MD                                                         Specimens:   1) - Breast, Left, left breast                                                                      2) - [place] Lymph Node, SENTINEL NODES WITH LYMPH TISSUE                                 Final Diagnosis   1.  Left breast, mastectomy:  A.  Invasive lobular carcinoma, grade 1, identified at prior biopsy site (18 mm).  B.  Associated lobular carcinoma in situ.  C.  Focal atypical lobular hyperplasia identified in random section from upper outer quadrant of breast.  D.  No evidence of Paget's disease of nipple.  E.  Skin and closest inked (posterior) surgical margin are negative for malignancy.  F.  Prior biopsy site changes including fibrosis and fat necrosis.  G.  Columnar cell changes without atypia.  H.  Usual ductal hyperplasia.  I.  Fibrocystic changes of breast with papillary apocrine change.  J.  Medial calcifications identified in mammary vasculature.  K.  Seborrheic keratoses, skin of breast.     2.  Left axillary sentinel lymph nodes:  A. Four of 4 lymph nodes are negative for metastatic carcinoma.  B.  Absence of micrometastases is confirmed utilizing immunohistochemical stains for pankeratin.     AJCC stage: pT1c pN0          Multiple questions were asked again why the seroma is occurring and when will it stop I could not tell her any time we had reduced it some it is averaging approximately 90 cc every week but there has been no significant reduction.  History    History  Past Medical History:   Diagnosis Date    Arthritis     Bladder cancer     Bladder cancer    Diabetes mellitus     GERD (gastroesophageal reflux disease)     Hard of hearing     Hx of excision of tumor of brain meninges   "   Hyperlipidemia     Hypertension     UTI (urinary tract infection)     Vertigo    ,   Past Surgical History:   Procedure Laterality Date    APPENDECTOMY      BRAIN TUMOR EXCISION      CHOLECYSTECTOMY      CYSTOSCOPY BLADDER BIOPSY N/A 7/25/2018    Procedure: CYSTOSCOPY BILATERAL RETROGRADE PYELOGRAM BLADDER BIOPSIES WITH FULGURATION;  Surgeon: Rj Tijerina MD;  Location:  [facility];  Service: Urology    CYSTOSCOPY BLADDER BIOPSY N/A 10/31/2018    Procedure: CYSTOSCOPY BLADDER BIOPSY WITH FULGURATION;  Surgeon: Rj Tijerina MD;  Location:  [facility];  Service: Urology    HYSTERECTOMY      MASTECTOMY W/ SENTINEL NODE BIOPSY Left 8/1/2023    Procedure: BREAST MASTECTOMY WITH MAG SEED GUIDED AND SENTINEL NODE BIOPSY;  Surgeon: Tonia Ledezma MD;  Location:  [facility];  Service: General;  Laterality: Left;    TOTAL KNEE ARTHROPLASTY Bilateral    ,   Family History   Problem Relation Age of Onset    No Known Problems Father     Cancer Mother     Diabetes Mother     Heart disease Mother    ,   Social History     Tobacco Use    Smoking status: Never    Smokeless tobacco: Never   Vaping Use    Vaping Use: Never used   Substance Use Topics    Alcohol use: Yes     Comment: \"One drink a year\"    Drug use: No   , (Not in a hospital admission)   and Allergies:  Halothane, Atorvastatin, Other, Cortisone, Lisinopril, Metformin, Mometasone furoate, Sulfa antibiotics, Tramadol, Codeine, Cortisol manager [cholestatin], Dexlansoprazole, Levaquin [levofloxacin], and Phenytoin sodium extended    Current Outpatient Medications:     albuterol sulfate  (90 Base) MCG/ACT inhaler, Inhale 1 puff Every 4 (Four) Hours As Needed for Shortness of Air or Wheezing., Disp: , Rfl:     Cetirizine HCl 10 MG capsule, Take 1 tablet by mouth Daily., Disp: , Rfl:     Coenzyme Q10 10 MG capsule, Take 400 mg by mouth Daily., Disp: , Rfl:     Collagen-Vitamin C-Biotin (Collagen 1500/C) 500-50-0.8 MG capsule, Take 3 tablets by mouth " Daily., Disp: , Rfl:     esomeprazole (nexIUM) 40 MG capsule, Take 1 capsule by mouth Every Morning Before Breakfast., Disp: , Rfl:     furosemide (Lasix) 20 MG tablet, Take 1 tablet by mouth Every Other Day for 30 doses., Disp: 30 tablet, Rfl: 1    glipiZIDE (GLUCOTROL XL) 5 MG ER tablet, Take 1 tablet by mouth Daily., Disp: , Rfl: 2    glucosamine-chondroitin 500-400 MG capsule capsule, Take 1 capsule by mouth 2 (Two) Times a Day., Disp: , Rfl:     HYDROcodone-acetaminophen (NORCO) 7.5-325 MG per tablet, Take 1 tablet by mouth Every 4 (Four) Hours As Needed for Moderate Pain (Pain)., Disp: 20 tablet, Rfl: 0    insulin detemir (LEVEMIR) 100 UNIT/ML injection, Inject 48 Units under the skin into the appropriate area as directed Every Night., Disp: , Rfl:     insulin glargine (LANTUS, SEMGLEE) 100 UNIT/ML injection, Inject 48 Units under the skin into the appropriate area as directed Daily. Every night, Disp: , Rfl:     insulin lispro protamine-insulin lispro (humaLOG 50-50) (50-50) 100 UNIT/ML suspension injection, Inject 6 Units under the skin into the appropriate area as directed 3 (Three) Times a Day., Disp: , Rfl:     Lactobacillus-Inulin ([facility] [employer]) capsule, Take 1 tablet by mouth Daily., Disp: , Rfl:     losartan (COZAAR) 50 MG tablet, Take 1 tablet by mouth Daily., Disp: , Rfl: 1    metFORMIN ER (GLUCOPHAGE-XR) 500 MG 24 hr tablet, Take 1 tablet by mouth Daily., Disp: 30 tablet, Rfl: 3    metoprolol succinate XL (TOPROL-XL) 100 MG 24 hr tablet, Take 0.5 tablets by mouth Every Night., Disp: , Rfl:     MULTIPLE VITAMIN PO, Take 1 tablet by mouth Daily., Disp: , Rfl:     potassium chloride (KLOR-CON) 8 MEQ CR tablet, Take 1 tablet by mouth Daily., Disp: 30 tablet, Rfl: 1    Current Facility-Administered Medications:     lidocaine (XYLOCAINE) 1 % injection 10 mL, 10 mL, Subcutaneous, Once, Sarai Gauthier MD    lidocaine 1% - EPINEPHrine [id number] (XYLOCAINE W/EPI) 1 %-1:709541 injection 10  "mL, 10 mL, Injection, Once, Sarai Gauthier MD    Objective     Vital Signs   There were no vitals taken for this visit.     Physical Exam:  Respirations are clear and equal    Cardiovascular exam regular rate rhythm    Left chest has a seroma present it was cleaned and aspirated yielding 40 cc of bloody straw-colored fluid, previously it was 90 cc and before that it was 180 cc.  It is slowly reduced I did have her on Lasix every other day with potassium supplements and this seems to have greatly reduced the seroma.  We discussed her incision she was concerned about the excess of skin on the extremities of the incision we explained why this was occurring, and her output is reducing.    Results Review:  Result Review :  Lab Results   Component Value Date    FINALDX  08/01/2023     1.  Left breast, mastectomy:  A.  Invasive lobular carcinoma, grade 1, identified at prior biopsy site (18 mm).  B.  Associated lobular carcinoma in situ.  C.  Focal atypical lobular hyperplasia identified in random section from upper outer quadrant of breast.  D.  No evidence of Paget's disease of nipple.  E.  Skin and closest inked (posterior) surgical margin are negative for malignancy.  F.  Prior biopsy site changes including fibrosis and fat necrosis.  G.  Columnar cell changes without atypia.  H.  Usual ductal hyperplasia.  I.  Fibrocystic changes of breast with papillary apocrine change.  J.  Medial calcifications identified in mammary vasculature.  K.  Seborrheic keratoses, skin of breast.    2.  Left axillary sentinel lymph nodes:  A. Four of 4 lymph nodes are negative for metastatic carcinoma.  B.  Absence of micrometastases is confirmed utilizing immunohistochemical stains for pankeratin.    AJCC stage: pT1c pN0      GROSSDES  08/01/2023     1. Breast, Left.   Received in a formalin filled container labeled with the patient's name, date of birth, and \"left breast\".  The specimen is received after hours on 8/1 and processed on " the morning of 8/2.  The specimen has a cold ischemic time of 35 minutes and a formalin fixation time of 28-1/2 hours.  The specimen consists of a left breast measuring 18.5 cm medial to lateral by 14.7 cm superior to inferior by 3.3 cm anterior to posterior and weighing 671 g.  The attached skin ellipse measures 22.5 x 15.3 cm.  The skin surface is marked by multiple tan-brown, slightly raised lesions mostly confined to the inferior-medial quadrant and ranging from 0.2 cm to 0.8 cm in greatest dimension.  The nipple appears unremarkable.  Sectioning through the lesions shows superficial thickening and are otherwise unremarkable.  The posterior margin is yellow-tan, mostly flattened and intact.  The posterior margin is inked black.  Sectioning reveals a jelly filled biopsy cavity in the central 1:00 aspect of the breast.  The biopsy cavity measures 1.4 cm in length by 0.5 cm in diameter.  The biopsy clip is identified within the gelatinous plug.  The biopsy cavity measures 3.5 cm from the posterior margin.  Surrounding the biopsy cavity is a slightly more stellate yellow to gray mass measuring 1.3 x 1.1 x 1.1 cm.  The soft mass measures 3.1 cm from the posterior margin, 5.5 cm from the superior margin, 8.3 cm from the inferior margin, 9.7 cm from the lateral margin, 5.7 cm from the medial margin, 4.8 cm from the skin, and 5.5 cm superior-lateral to the nipple.  Sectioning through the remaining breast reveals yellow-pink, soft and lobulated fibroadipose tissue with thin fibrous septae and evidence of fibrocystic change.  1A-perpendicular section of nipple  1B-representative sections of skin lesions  1C-representative section of unremarkable skin  1D-perpendicular section of posterior margin adjacent to mass  1E through 1H-representative sections of biopsy cavity and mass  1I-random upper outer quadrant  1J-random lower outer quadrant  1K-random upper inner quadrant  1L-random lower inner quadrant    2. [place] Lymph  Node.   Received in a formalin filled container labeled with the patient's name, date of birth, and designated sentinel lymph node-left axilla.  The specimen consists of 2 fragments of yellow-pink soft tissue aggregating to 4.5 x 3.5 x 1.4 cm.  Dissection reveals multiple lymph node candidates measuring up to 2.9 cm.  The cut surface of the largest lymph node candidate exhibits 30% fatty replacement with green dye uptake.  The cut surface of the remaining lymph node candidates are unremarkable.  2A through 2D-largest lymph node candidate  2E and 2F-bisected lymph node candidate  2G-1 bisected and 1 intact lymph node candidate           BMI is within normal parameters. No other follow-up for BMI required.    Assessment & Plan     Currently 2 months out from left mastectomy presently doing well.  Tumor was 18 mm sentinel lymph nodes were negative, she is battling a seroma but this is gradually reducing its output, I will see her back in 1 week for repeat evaluation and repeat aspiration.    Discussed BMI elevation and need to move toward a reduced BMI for health concerns she appears to understand she is a non smoker and her meds were reviewed.      Julio Preston MD  09/26/23  12:15 CDT

## 2023-09-27 ENCOUNTER — OFFICE VISIT (OUTPATIENT)
Dept: SURGERY | Facility: CLINIC | Age: 78
End: 2023-09-27
Payer: MEDICARE

## 2023-09-27 VITALS
SYSTOLIC BLOOD PRESSURE: 134 MMHG | WEIGHT: 143 LBS | HEIGHT: 66 IN | BODY MASS INDEX: 22.98 KG/M2 | OXYGEN SATURATION: 100 % | HEART RATE: 68 BPM | DIASTOLIC BLOOD PRESSURE: 74 MMHG

## 2023-09-27 DIAGNOSIS — C50.912 MALIGNANT NEOPLASM OF LEFT FEMALE BREAST, UNSPECIFIED ESTROGEN RECEPTOR STATUS, UNSPECIFIED SITE OF BREAST: ICD-10-CM

## 2023-09-27 DIAGNOSIS — D09.0 CIS (CARCINOMA IN SITU OF BLADDER): Primary | ICD-10-CM

## 2023-09-27 PROCEDURE — 1160F RVW MEDS BY RX/DR IN RCRD: CPT | Performed by: SPECIALIST

## 2023-09-27 PROCEDURE — 1159F MED LIST DOCD IN RCRD: CPT | Performed by: SPECIALIST

## 2023-09-27 PROCEDURE — 3078F DIAST BP <80 MM HG: CPT | Performed by: SPECIALIST

## 2023-09-27 PROCEDURE — 99024 POSTOP FOLLOW-UP VISIT: CPT | Performed by: SPECIALIST

## 2023-09-27 PROCEDURE — 3075F SYST BP GE 130 - 139MM HG: CPT | Performed by: SPECIALIST

## 2023-09-27 NOTE — PATIENT INSTRUCTIONS
Thanks for coming today MsDarren Forrest and reviewing math and art class with me, I think your seroma is getting better I will see you back in 1 week we will repeat the aspiration if needed but otherwise see you in 1 week if it seems to be improving.

## 2023-09-28 DIAGNOSIS — R23.2 HOT FLASHES: Primary | ICD-10-CM

## 2023-09-28 RX ORDER — OXYBUTYNIN CHLORIDE 5 MG/1
2.5 TABLET ORAL 2 TIMES DAILY
Qty: 90 TABLET | Refills: 3 | Status: SHIPPED | OUTPATIENT
Start: 2023-09-28

## 2023-10-04 ENCOUNTER — OFFICE VISIT (OUTPATIENT)
Dept: SURGERY | Facility: CLINIC | Age: 78
End: 2023-10-04
Payer: MEDICARE

## 2023-10-04 VITALS
DIASTOLIC BLOOD PRESSURE: 73 MMHG | BODY MASS INDEX: 22.98 KG/M2 | WEIGHT: 143 LBS | OXYGEN SATURATION: 96 % | SYSTOLIC BLOOD PRESSURE: 124 MMHG | HEIGHT: 66 IN | HEART RATE: 81 BPM

## 2023-10-04 DIAGNOSIS — C50.912 MALIGNANT NEOPLASM OF LEFT FEMALE BREAST, UNSPECIFIED ESTROGEN RECEPTOR STATUS, UNSPECIFIED SITE OF BREAST: ICD-10-CM

## 2023-10-04 DIAGNOSIS — N63.21 MASS OF UPPER OUTER QUADRANT OF LEFT BREAST: Primary | ICD-10-CM

## 2023-10-04 DIAGNOSIS — D09.0 CIS (CARCINOMA IN SITU OF BLADDER): ICD-10-CM

## 2023-10-04 PROCEDURE — 1160F RVW MEDS BY RX/DR IN RCRD: CPT | Performed by: SPECIALIST

## 2023-10-04 PROCEDURE — 1159F MED LIST DOCD IN RCRD: CPT | Performed by: SPECIALIST

## 2023-10-04 PROCEDURE — 3078F DIAST BP <80 MM HG: CPT | Performed by: SPECIALIST

## 2023-10-04 PROCEDURE — 3074F SYST BP LT 130 MM HG: CPT | Performed by: SPECIALIST

## 2023-10-04 PROCEDURE — 99024 POSTOP FOLLOW-UP VISIT: CPT | Performed by: SPECIALIST

## 2023-10-04 NOTE — PATIENT INSTRUCTIONS
Nice to see you today Ms. Clayton I will see you back in 1 week for repeat aspiration of the seroma.

## 2023-10-04 NOTE — PROGRESS NOTES
Office Established Patient Note:     Referring Provider: Dr. Hung Nicole    Chief complaint 10 weeks out from mastectomy with recurrent seroma here for follow-up and repeat aspiration    Subjective .     History of present illness:Pam Clayton is a 78 y.o. female currently 5 weeks out from a left mastectomy with a seroma present, she was aspirated earlier this week and she is here for follow-up complaining of fluid, we will evaluate the area and aspirate as needed.  She saw Brisa last week and he has been aspirated 60 cc out I saw 2 weeks ago and aspirated approximately 100 cc out.  She is back in the office now she states that she has been aspirated at least 5 times, she is frustrated with the situation, she states that her  is also has terminal cancer and she is frustrated with this fluid and also the need for reaspiration.        She is currently here for follow-up her  is in the hospital with his terminal cancer, she is here for follow-up of her seroma from her left chest from Dr Dixon's mastectomy.     She is currently 8 weeks out from mastectomy, I am seeing her weekly due to seroma, she is doing much better, the fullness in the left chest is much reduced, and the area was checked and reaspirated today 40 cc 90 cc last week and 180 the day week before.    She is here for weekly evaluation and aspiration of this seroma following Dr Dixon's mastectomy in early August she is having no problems there seems to be some fluid present probably 25 to 30 cc and we will look toward aspiration after aspiration it turned out 35 cc of blood-tinged fluid with full resolution.    History  Past Medical History:   Diagnosis Date    Arthritis     Bladder cancer     Bladder cancer    Diabetes mellitus     GERD (gastroesophageal reflux disease)     Hard of hearing     Hx of excision of tumor of brain meninges     Hyperlipidemia     Hypertension     UTI (urinary tract infection)     Vertigo    ,   Past  "Surgical History:   Procedure Laterality Date    APPENDECTOMY      BRAIN TUMOR EXCISION      CHOLECYSTECTOMY      CYSTOSCOPY BLADDER BIOPSY N/A 7/25/2018    Procedure: CYSTOSCOPY BILATERAL RETROGRADE PYELOGRAM BLADDER BIOPSIES WITH FULGURATION;  Surgeon: Rj Tijerina MD;  Location:  [facility];  Service: Urology    CYSTOSCOPY BLADDER BIOPSY N/A 10/31/2018    Procedure: CYSTOSCOPY BLADDER BIOPSY WITH FULGURATION;  Surgeon: Rj Tijerina MD;  Location:  [facility];  Service: Urology    HYSTERECTOMY      MASTECTOMY W/ SENTINEL NODE BIOPSY Left 8/1/2023    Procedure: BREAST MASTECTOMY WITH MAG SEED GUIDED AND SENTINEL NODE BIOPSY;  Surgeon: Tonia Ledezma MD;  Location:  [facility];  Service: General;  Laterality: Left;    TOTAL KNEE ARTHROPLASTY Bilateral    ,   Family History   Problem Relation Age of Onset    No Known Problems Father     Cancer Mother     Diabetes Mother     Heart disease Mother    ,   Social History     Tobacco Use    Smoking status: Never    Smokeless tobacco: Never   Vaping Use    Vaping Use: Never used   Substance Use Topics    Alcohol use: Yes     Comment: \"One drink a year\"    Drug use: No   , (Not in a hospital admission)   and Allergies:  Halothane, Atorvastatin, Other, Cortisone, Lisinopril, Metformin, Mometasone furoate, Sulfa antibiotics, Tramadol, Codeine, Cortisol manager [cholestatin], Dexlansoprazole, Levaquin [levofloxacin], and Phenytoin sodium extended    Current Outpatient Medications:     albuterol sulfate  (90 Base) MCG/ACT inhaler, Inhale 1 puff Every 4 (Four) Hours As Needed for Shortness of Air or Wheezing., Disp: , Rfl:     Cetirizine HCl 10 MG capsule, Take 1 tablet by mouth Daily., Disp: , Rfl:     Coenzyme Q10 10 MG capsule, Take 400 mg by mouth Daily., Disp: , Rfl:     Collagen-Vitamin C-Biotin (Collagen 1500/C) 500-50-0.8 MG capsule, Take 3 tablets by mouth Daily. (Patient not taking: Reported on 9/27/2023), Disp: , Rfl:     esomeprazole (nexIUM) 40 MG " capsule, Take 1 capsule by mouth Every Morning Before Breakfast., Disp: , Rfl:     furosemide (Lasix) 20 MG tablet, Take 1 tablet by mouth Every Other Day for 30 doses. (Patient not taking: Reported on 9/27/2023), Disp: 30 tablet, Rfl: 1    glipiZIDE (GLUCOTROL XL) 5 MG ER tablet, Take 1 tablet by mouth Daily. (Patient not taking: Reported on 9/27/2023), Disp: , Rfl: 2    glucosamine-chondroitin 500-400 MG capsule capsule, Take 1 capsule by mouth 2 (Two) Times a Day., Disp: , Rfl:     HYDROcodone-acetaminophen (NORCO) 7.5-325 MG per tablet, Take 1 tablet by mouth Every 4 (Four) Hours As Needed for Moderate Pain (Pain)., Disp: 20 tablet, Rfl: 0    insulin detemir (LEVEMIR) 100 UNIT/ML injection, Inject 48 Units under the skin into the appropriate area as directed Every Night., Disp: , Rfl:     insulin glargine (LANTUS, SEMGLEE) 100 UNIT/ML injection, Inject 48 Units under the skin into the appropriate area as directed Daily. Every night, Disp: , Rfl:     insulin lispro protamine-insulin lispro (humaLOG 50-50) (50-50) 100 UNIT/ML suspension injection, Inject 6 Units under the skin into the appropriate area as directed 3 (Three) Times a Day., Disp: , Rfl:     Lactobacillus-Inulin ([facility] [employer]) capsule, Take 1 tablet by mouth Daily., Disp: , Rfl:     losartan (COZAAR) 50 MG tablet, Take 1 tablet by mouth Daily. (Patient not taking: Reported on 9/27/2023), Disp: , Rfl: 1    metFORMIN ER (GLUCOPHAGE-XR) 500 MG 24 hr tablet, Take 1 tablet by mouth Daily. (Patient not taking: Reported on 9/27/2023), Disp: 30 tablet, Rfl: 3    metoprolol succinate XL (TOPROL-XL) 100 MG 24 hr tablet, Take 0.5 tablets by mouth Every Night., Disp: , Rfl:     MULTIPLE VITAMIN PO, Take 1 tablet by mouth Daily. (Patient not taking: Reported on 9/27/2023), Disp: , Rfl:     potassium chloride (KLOR-CON) 8 MEQ CR tablet, Take 1 tablet by mouth Daily. (Patient not taking: Reported on 9/27/2023), Disp: 30 tablet, Rfl: 1    Current  "Facility-Administered Medications:     lidocaine (XYLOCAINE) 1 % injection 10 mL, 10 mL, Subcutaneous, Once, Sarai Gauthier MD    lidocaine 1% - EPINEPHrine 1:051278 (XYLOCAINE W/EPI) 1 %-1:739913 injection 10 mL, 10 mL, Injection, Once, Sarai Gauthier MD    Objective     Vital Signs   There were no vitals taken for this visit.     Physical Exam:  Left anterior chest was scrubbed prepped and draped with alcohol and Betadine, aspiration was accomplished after local was injected, 35 cc of blood-tinged fluid was removed without difficulty.    Results Review:  Result Review :  Lab Results   Component Value Date    FINALDX  08/01/2023     1.  Left breast, mastectomy:  A.  Invasive lobular carcinoma, grade 1, identified at prior biopsy site (18 mm).  B.  Associated lobular carcinoma in situ.  C.  Focal atypical lobular hyperplasia identified in random section from upper outer quadrant of breast.  D.  No evidence of Paget's disease of nipple.  E.  Skin and closest inked (posterior) surgical margin are negative for malignancy.  F.  Prior biopsy site changes including fibrosis and fat necrosis.  G.  Columnar cell changes without atypia.  H.  Usual ductal hyperplasia.  I.  Fibrocystic changes of breast with papillary apocrine change.  J.  Medial calcifications identified in mammary vasculature.  K.  Seborrheic keratoses, skin of breast.    2.  Left axillary sentinel lymph nodes:  A. Four of 4 lymph nodes are negative for metastatic carcinoma.  B.  Absence of micrometastases is confirmed utilizing immunohistochemical stains for pankeratin.    AJCC stage: pT1c pN0      GROSSDES  08/01/2023     1. Breast, Left.   Received in a formalin filled container labeled with the patient's name, date of birth, and \"left breast\".  The specimen is received after hours on 8/1 and processed on the morning of 8/2.  The specimen has a cold ischemic time of 35 minutes and a formalin fixation time of 28-1/2 hours.  The specimen consists of " a left breast measuring 18.5 cm medial to lateral by 14.7 cm superior to inferior by 3.3 cm anterior to posterior and weighing 671 g.  The attached skin ellipse measures 22.5 x 15.3 cm.  The skin surface is marked by multiple tan-brown, slightly raised lesions mostly confined to the inferior-medial quadrant and ranging from 0.2 cm to 0.8 cm in greatest dimension.  The nipple appears unremarkable.  Sectioning through the lesions shows superficial thickening and are otherwise unremarkable.  The posterior margin is yellow-tan, mostly flattened and intact.  The posterior margin is inked black.  Sectioning reveals a jelly filled biopsy cavity in the central 1:00 aspect of the breast.  The biopsy cavity measures 1.4 cm in length by 0.5 cm in diameter.  The biopsy clip is identified within the gelatinous plug.  The biopsy cavity measures 3.5 cm from the posterior margin.  Surrounding the biopsy cavity is a slightly more stellate yellow to gray mass measuring 1.3 x 1.1 x 1.1 cm.  The soft mass measures 3.1 cm from the posterior margin, 5.5 cm from the superior margin, 8.3 cm from the inferior margin, 9.7 cm from the lateral margin, 5.7 cm from the medial margin, 4.8 cm from the skin, and 5.5 cm superior-lateral to the nipple.  Sectioning through the remaining breast reveals yellow-pink, soft and lobulated fibroadipose tissue with thin fibrous septae and evidence of fibrocystic change.  1A-perpendicular section of nipple  1B-representative sections of skin lesions  1C-representative section of unremarkable skin  1D-perpendicular section of posterior margin adjacent to mass  1E through 1H-representative sections of biopsy cavity and mass  1I-random upper outer quadrant  1J-random lower outer quadrant  1K-random upper inner quadrant  1L-random lower inner quadrant    2. [place] Lymph Node.   Received in a formalin filled container labeled with the patient's name, date of birth, and designated sentinel lymph node-left axilla.   The specimen consists of 2 fragments of yellow-pink soft tissue aggregating to 4.5 x 3.5 x 1.4 cm.  Dissection reveals multiple lymph node candidates measuring up to 2.9 cm.  The cut surface of the largest lymph node candidate exhibits 30% fatty replacement with green dye uptake.  The cut surface of the remaining lymph node candidates are unremarkable.  2A through 2D-largest lymph node candidate  2E and 2F-bisected lymph node candidate  2G-1 bisected and 1 intact lymph node candidate           BMI is within normal parameters. No other follow-up for BMI required.    Assessment & Plan     Status post mastectomy with postoperative seroma, presently doing well, she will be following up with me in 1 week for wound check, and repeat aspiration as needed.    Discussed BMI elevation and need to move toward a reduced BMI for health concerns she appears to understand she is a non smoker and her meds were reviewed.      Julio Preston MD  10/04/23  07:40 CDT

## 2023-10-10 NOTE — PROGRESS NOTES
Office Established Patient Note:     Referring Provider: Dr. Hung Nicole    Chief complaint 11 weeks out from mastectomy with recurrent seroma here for follow-up evaluation and repeat aspiration if needed    Subjective .     History of present illness: Pam Clayton is a 78 y.o. female currently 5 weeks out from a left mastectomy with a seroma present, she was aspirated earlier this week and she is here for follow-up complaining of fluid, we will evaluate the area and aspirate as needed.  She saw Brisa last week and he has been aspirated 60 cc out I saw 2 weeks ago and aspirated approximately 100 cc out.  She is back in the office now she states that she has been aspirated at least 5 times, she is frustrated with the situation, she states that her  is also has terminal cancer and she is frustrated with this fluid and also the need for reaspiration.        She is currently here for follow-up her  is in the hospital with his terminal cancer, she is here for follow-up of her seroma from her left chest from Dr Dixon's mastectomy.     She is currently 8 weeks out from mastectomy, I am seeing her weekly due to seroma, she is doing much better, the fullness in the left chest is much reduced, and the area was checked and reaspirated today 40 cc 90 cc last week and 180 the day week before.     She is here for weekly evaluation and aspiration of this seroma following Dr Dixon's mastectomy in early August she is having no problems there seems to be some fluid present probably 25 to 30 cc and we will look toward aspiration after aspiration it turned out 35 cc of blood-tinged fluid with full resolution.    She is currently 2 months out from surgery I am following her for a seroma in the left chest last week I saw her and we aspirated 35 cc of blood-tinged fluid she is here for follow-up.       History  Past Medical History:   Diagnosis Date    Arthritis     Bladder cancer     Bladder cancer    Diabetes  "mellitus     GERD (gastroesophageal reflux disease)     Hard of hearing     Hx of excision of tumor of brain meninges     Hyperlipidemia     Hypertension     UTI (urinary tract infection)     Vertigo    ,   Past Surgical History:   Procedure Laterality Date    APPENDECTOMY      BRAIN TUMOR EXCISION      CHOLECYSTECTOMY      CYSTOSCOPY BLADDER BIOPSY N/A 7/25/2018    Procedure: CYSTOSCOPY BILATERAL RETROGRADE PYELOGRAM BLADDER BIOPSIES WITH FULGURATION;  Surgeon: Rj Tijerina MD;  Location:  [facility];  Service: Urology    CYSTOSCOPY BLADDER BIOPSY N/A 10/31/2018    Procedure: CYSTOSCOPY BLADDER BIOPSY WITH FULGURATION;  Surgeon: Rj Tijerina MD;  Location:  [facility];  Service: Urology    HYSTERECTOMY      MASTECTOMY W/ SENTINEL NODE BIOPSY Left 8/1/2023    Procedure: BREAST MASTECTOMY WITH MAG SEED GUIDED AND SENTINEL NODE BIOPSY;  Surgeon: Tonia Ledezma MD;  Location:  [facility];  Service: General;  Laterality: Left;    TOTAL KNEE ARTHROPLASTY Bilateral    ,   Family History   Problem Relation Age of Onset    No Known Problems Father     Cancer Mother     Diabetes Mother     Heart disease Mother    ,   Social History     Tobacco Use    Smoking status: Never    Smokeless tobacco: Never   Vaping Use    Vaping Use: Never used   Substance Use Topics    Alcohol use: Yes     Comment: \"One drink a year\"    Drug use: No   , (Not in a hospital admission)   and Allergies:  Halothane, Atorvastatin, Other, Cortisone, Lisinopril, Metformin, Mometasone furoate, Sulfa antibiotics, Tramadol, Codeine, Cortisol manager [cholestatin], Dexlansoprazole, Levaquin [levofloxacin], and Phenytoin sodium extended    Current Outpatient Medications:     albuterol sulfate  (90 Base) MCG/ACT inhaler, Inhale 1 puff Every 4 (Four) Hours As Needed for Shortness of Air or Wheezing., Disp: , Rfl:     Cetirizine HCl 10 MG capsule, Take 1 tablet by mouth Daily., Disp: , Rfl:     Coenzyme Q10 10 MG capsule, Take 400 mg by mouth " Daily., Disp: , Rfl:     Collagen-Vitamin C-Biotin (Collagen 1500/C) 500-50-0.8 MG capsule, Take 3 tablets by mouth Daily., Disp: , Rfl:     esomeprazole (nexIUM) 40 MG capsule, Take 1 capsule by mouth Every Morning Before Breakfast., Disp: , Rfl:     furosemide (Lasix) 20 MG tablet, Take 1 tablet by mouth Every Other Day for 30 doses., Disp: 30 tablet, Rfl: 1    glipiZIDE (GLUCOTROL XL) 5 MG ER tablet, Take 1 tablet by mouth Daily., Disp: , Rfl: 2    glucosamine-chondroitin 500-400 MG capsule capsule, Take 1 capsule by mouth 2 (Two) Times a Day., Disp: , Rfl:     HYDROcodone-acetaminophen (NORCO) 7.5-325 MG per tablet, Take 1 tablet by mouth Every 4 (Four) Hours As Needed for Moderate Pain (Pain)., Disp: 20 tablet, Rfl: 0    insulin detemir (LEVEMIR) 100 UNIT/ML injection, Inject 48 Units under the skin into the appropriate area as directed Every Night., Disp: , Rfl:     insulin glargine (LANTUS, SEMGLEE) 100 UNIT/ML injection, Inject 48 Units under the skin into the appropriate area as directed Daily. Every night, Disp: , Rfl:     insulin lispro protamine-insulin lispro (humaLOG 50-50) (50-50) 100 UNIT/ML suspension injection, Inject 6 Units under the skin into the appropriate area as directed 3 (Three) Times a Day., Disp: , Rfl:     Lactobacillus-Inulin ([facility]) capsule, Take 1 tablet by mouth Daily., Disp: , Rfl:     losartan (COZAAR) 50 MG tablet, Take 1 tablet by mouth Daily., Disp: , Rfl: 1    metFORMIN ER (GLUCOPHAGE-XR) 500 MG 24 hr tablet, Take 1 tablet by mouth Daily., Disp: 30 tablet, Rfl: 3    metoprolol succinate XL (TOPROL-XL) 100 MG 24 hr tablet, Take 0.5 tablets by mouth Every Night., Disp: , Rfl:     MULTIPLE VITAMIN PO, Take 1 tablet by mouth Daily., Disp: , Rfl:     potassium chloride (KLOR-CON) 8 MEQ CR tablet, Take 1 tablet by mouth Daily., Disp: 30 tablet, Rfl: 1    Current Facility-Administered Medications:     lidocaine (XYLOCAINE) 1 % injection 10 mL, 10 mL,  "Subcutaneous, Once, Sarai Gauthier MD    lidocaine 1% - EPINEPHrine 1:141427 (XYLOCAINE W/EPI) 1 %-1:258839 injection 10 mL, 10 mL, Injection, Once, Sarai Gauthier MD    Objective     Vital Signs   There were no vitals taken for this visit.     Physical Exam:  Breast exam unremarkable right breast, left chest unremarkable absolutely no fluid no seroma no hematoma fully resolved seroma nicely healed wound.    Results Review:  Result Review :  Lab Results   Component Value Date    FINALDX  08/01/2023     1.  Left breast, mastectomy:  A.  Invasive lobular carcinoma, grade 1, identified at prior biopsy site (18 mm).  B.  Associated lobular carcinoma in situ.  C.  Focal atypical lobular hyperplasia identified in random section from upper outer quadrant of breast.  D.  No evidence of Paget's disease of nipple.  E.  Skin and closest inked (posterior) surgical margin are negative for malignancy.  F.  Prior biopsy site changes including fibrosis and fat necrosis.  G.  Columnar cell changes without atypia.  H.  Usual ductal hyperplasia.  I.  Fibrocystic changes of breast with papillary apocrine change.  J.  Medial calcifications identified in mammary vasculature.  K.  Seborrheic keratoses, skin of breast.    2.  Left axillary sentinel lymph nodes:  A. Four of 4 lymph nodes are negative for metastatic carcinoma.  B.  Absence of micrometastases is confirmed utilizing immunohistochemical stains for pankeratin.    AJCC stage: pT1c pN0      GROSSDES  08/01/2023     1. Breast, Left.   Received in a formalin filled container labeled with the patient's name, date of birth, and \"left breast\".  The specimen is received after hours on 8/1 and processed on the morning of 8/2.  The specimen has a cold ischemic time of 35 minutes and a formalin fixation time of 28-1/2 hours.  The specimen consists of a left breast measuring 18.5 cm medial to lateral by 14.7 cm superior to inferior by 3.3 cm anterior to posterior and weighing 671 g. "  The attached skin ellipse measures 22.5 x 15.3 cm.  The skin surface is marked by multiple tan-brown, slightly raised lesions mostly confined to the inferior-medial quadrant and ranging from 0.2 cm to 0.8 cm in greatest dimension.  The nipple appears unremarkable.  Sectioning through the lesions shows superficial thickening and are otherwise unremarkable.  The posterior margin is yellow-tan, mostly flattened and intact.  The posterior margin is inked black.  Sectioning reveals a jelly filled biopsy cavity in the central 1:00 aspect of the breast.  The biopsy cavity measures 1.4 cm in length by 0.5 cm in diameter.  The biopsy clip is identified within the gelatinous plug.  The biopsy cavity measures 3.5 cm from the posterior margin.  Surrounding the biopsy cavity is a slightly more stellate yellow to gray mass measuring 1.3 x 1.1 x 1.1 cm.  The soft mass measures 3.1 cm from the posterior margin, 5.5 cm from the superior margin, 8.3 cm from the inferior margin, 9.7 cm from the lateral margin, 5.7 cm from the medial margin, 4.8 cm from the skin, and 5.5 cm superior-lateral to the nipple.  Sectioning through the remaining breast reveals yellow-pink, soft and lobulated fibroadipose tissue with thin fibrous septae and evidence of fibrocystic change.  1A-perpendicular section of nipple  1B-representative sections of skin lesions  1C-representative section of unremarkable skin  1D-perpendicular section of posterior margin adjacent to mass  1E through 1H-representative sections of biopsy cavity and mass  1I-random upper outer quadrant  1J-random lower outer quadrant  1K-random upper inner quadrant  1L-random lower inner quadrant    2. [place] Lymph Node.   Received in a formalin filled container labeled with the patient's name, date of birth, and designated sentinel lymph node-left axilla.  The specimen consists of 2 fragments of yellow-pink soft tissue aggregating to 4.5 x 3.5 x 1.4 cm.  Dissection reveals multiple lymph  node candidates measuring up to 2.9 cm.  The cut surface of the largest lymph node candidate exhibits 30% fatty replacement with green dye uptake.  The cut surface of the remaining lymph node candidates are unremarkable.  2A through 2D-largest lymph node candidate  2E and 2F-bisected lymph node candidate  2G-1 bisected and 1 intact lymph node candidate           BMI is within normal parameters. No other follow-up for BMI required.    Assessment & Plan       Status post left mastectomy pathology noted above, with a postoperative seroma we have been aspirating no fluid noted the last time we aspirated this was 1 week ago, and 35 cc at that time.  I will see her back in 1 month, if no seroma is noted at that time we can discharge and follow-up as needed.  Continue her treatment by oncology.    Discussed BMI elevation and need to move toward a reduced BMI for health concerns she appears to understand she is a non smoker and her meds were reviewed.      Julio Preston MD  10/10/23  18:22 CDT

## 2023-10-11 ENCOUNTER — OFFICE VISIT (OUTPATIENT)
Dept: SURGERY | Facility: CLINIC | Age: 78
End: 2023-10-11
Payer: MEDICARE

## 2023-10-11 VITALS
OXYGEN SATURATION: 95 % | DIASTOLIC BLOOD PRESSURE: 67 MMHG | HEIGHT: 66 IN | WEIGHT: 143 LBS | HEART RATE: 86 BPM | BODY MASS INDEX: 22.98 KG/M2 | SYSTOLIC BLOOD PRESSURE: 125 MMHG

## 2023-10-11 DIAGNOSIS — C50.912 MALIGNANT NEOPLASM OF LEFT FEMALE BREAST, UNSPECIFIED ESTROGEN RECEPTOR STATUS, UNSPECIFIED SITE OF BREAST: Primary | ICD-10-CM

## 2023-10-11 NOTE — PATIENT INSTRUCTIONS
Thanks for coming today Ms. Clayton I will see you back in 1 month if no further fluid is noted I will see you back as needed.

## 2023-11-09 DIAGNOSIS — C50.912 LOBULAR CARCINOMA OF LEFT BREAST, STAGE 1, ESTROGEN RECEPTOR POSITIVE (HCC): ICD-10-CM

## 2023-11-09 DIAGNOSIS — Z17.0 LOBULAR CARCINOMA OF LEFT BREAST, STAGE 1, ESTROGEN RECEPTOR POSITIVE (HCC): ICD-10-CM

## 2023-11-09 RX ORDER — TAMOXIFEN CITRATE 10 MG/1
10 TABLET ORAL DAILY
Qty: 30 TABLET | Refills: 5 | Status: SHIPPED | OUTPATIENT
Start: 2023-11-09

## 2023-11-10 ENCOUNTER — OFFICE VISIT (OUTPATIENT)
Dept: SURGERY | Facility: CLINIC | Age: 78
End: 2023-11-10
Payer: MEDICARE

## 2023-11-10 VITALS
DIASTOLIC BLOOD PRESSURE: 62 MMHG | HEIGHT: 66 IN | OXYGEN SATURATION: 97 % | HEART RATE: 69 BPM | SYSTOLIC BLOOD PRESSURE: 123 MMHG | BODY MASS INDEX: 23.08 KG/M2

## 2023-11-10 DIAGNOSIS — C50.912 MALIGNANT NEOPLASM OF LEFT FEMALE BREAST, UNSPECIFIED ESTROGEN RECEPTOR STATUS, UNSPECIFIED SITE OF BREAST: Primary | ICD-10-CM

## 2023-11-10 PROCEDURE — 3078F DIAST BP <80 MM HG: CPT | Performed by: SPECIALIST

## 2023-11-10 PROCEDURE — 1160F RVW MEDS BY RX/DR IN RCRD: CPT | Performed by: SPECIALIST

## 2023-11-10 PROCEDURE — 99024 POSTOP FOLLOW-UP VISIT: CPT | Performed by: SPECIALIST

## 2023-11-10 PROCEDURE — 3074F SYST BP LT 130 MM HG: CPT | Performed by: SPECIALIST

## 2023-11-10 PROCEDURE — 1159F MED LIST DOCD IN RCRD: CPT | Performed by: SPECIALIST

## 2023-11-10 NOTE — PATIENT INSTRUCTIONS
Good to see you today Ms. Clayton, unfortunately we had some fluid that came up I will see you back in 3 weeks for repeat evaluation and repeat aspiration if needed.  Have a great Thanksgiving.

## 2023-11-10 NOTE — PROGRESS NOTES
Office Established Patient Note:     Referring Provider: Dr. Hung Nicole    Chief complaint 15 weeks from mastectomy with recurrent seroma here for follow-up evaluation    Subjective .     History of present illness:  Pam Clayton is a 78 y.o. female currently 5 weeks out from a left mastectomy with a seroma present, she was aspirated earlier this week and she is here for follow-up complaining of fluid, we will evaluate the area and aspirate as needed.  She saw Brisa last week and he has been aspirated 60 cc out I saw 2 weeks ago and aspirated approximately 100 cc out.  She is back in the office now she states that she has been aspirated at least 5 times, she is frustrated with the situation, she states that her  is also has terminal cancer and she is frustrated with this fluid and also the need for reaspiration.        She is currently here for follow-up her  is in the hospital with his terminal cancer, she is here for follow-up of her seroma from her left chest from Dr Dixon's mastectomy.     She is currently 8 weeks out from mastectomy, I am seeing her weekly due to seroma, she is doing much better, the fullness in the left chest is much reduced, and the area was checked and reaspirated today 40 cc 90 cc last week and 180 the day week before.     She is here for weekly evaluation and aspiration of this seroma following Dr Dixon's mastectomy in early August she is having no problems there seems to be some fluid present probably 25 to 30 cc and we will look toward aspiration after aspiration it turned out 35 cc of blood-tinged fluid with full resolution.     She is currently 2 months out from surgery I am following her for a seroma in the left chest last week I saw her and we aspirated 35 cc of blood-tinged fluid    Currently 3 months out from surgery, I have not seen her for the past month, she does have a seroma back, we will aspirate this ultimately yielded 60 cc of fluid.    History  Past  "Medical History:   Diagnosis Date    Arthritis     Bladder cancer     Bladder cancer    Diabetes mellitus     GERD (gastroesophageal reflux disease)     Hard of hearing     Hx of excision of tumor of brain meninges     Hyperlipidemia     Hypertension     UTI (urinary tract infection)     Vertigo    ,   Past Surgical History:   Procedure Laterality Date    APPENDECTOMY      BRAIN TUMOR EXCISION      CHOLECYSTECTOMY      CYSTOSCOPY BLADDER BIOPSY N/A 7/25/2018    Procedure: CYSTOSCOPY BILATERAL RETROGRADE PYELOGRAM BLADDER BIOPSIES WITH FULGURATION;  Surgeon: Rj Tijerina MD;  Location:  [facility];  Service: Urology    CYSTOSCOPY BLADDER BIOPSY N/A 10/31/2018    Procedure: CYSTOSCOPY BLADDER BIOPSY WITH FULGURATION;  Surgeon: Rj Tijerina MD;  Location:  [facility];  Service: Urology    HYSTERECTOMY      MASTECTOMY W/ SENTINEL NODE BIOPSY Left 8/1/2023    Procedure: BREAST MASTECTOMY WITH MAG SEED GUIDED AND SENTINEL NODE BIOPSY;  Surgeon: Tonia Ledezma MD;  Location:  [facility];  Service: General;  Laterality: Left;    TOTAL KNEE ARTHROPLASTY Bilateral    ,   Family History   Problem Relation Age of Onset    No Known Problems Father     Cancer Mother     Diabetes Mother     Heart disease Mother    ,   Social History     Tobacco Use    Smoking status: Never    Smokeless tobacco: Never   Vaping Use    Vaping Use: Never used   Substance Use Topics    Alcohol use: Yes     Comment: \"One drink a year\"    Drug use: No   , (Not in a hospital admission)   and Allergies:  Halothane, Atorvastatin, Other, Cortisone, Lisinopril, Metformin, Mometasone furoate, Sulfa antibiotics, Tramadol, Codeine, Cortisol manager [cholestatin], Dexlansoprazole, Levaquin [levofloxacin], and Phenytoin sodium extended    Current Outpatient Medications:     albuterol sulfate  (90 Base) MCG/ACT inhaler, Inhale 1 puff Every 4 (Four) Hours As Needed for Shortness of Air or Wheezing., Disp: , Rfl:     Cetirizine HCl 10 MG capsule, " Take 1 tablet by mouth Daily., Disp: , Rfl:     Coenzyme Q10 10 MG capsule, Take 400 mg by mouth Daily., Disp: , Rfl:     Collagen-Vitamin C-Biotin (Collagen 1500/C) 500-50-0.8 MG capsule, Take 3 tablets by mouth Daily., Disp: , Rfl:     esomeprazole (nexIUM) 40 MG capsule, Take 1 capsule by mouth Every Morning Before Breakfast., Disp: , Rfl:     furosemide (Lasix) 20 MG tablet, Take 1 tablet by mouth Every Other Day for 30 doses., Disp: 30 tablet, Rfl: 1    glipiZIDE (GLUCOTROL XL) 5 MG ER tablet, Take 1 tablet by mouth Daily., Disp: , Rfl: 2    glucosamine-chondroitin 500-400 MG capsule capsule, Take 1 capsule by mouth 2 (Two) Times a Day., Disp: , Rfl:     HYDROcodone-acetaminophen (NORCO) 7.5-325 MG per tablet, Take 1 tablet by mouth Every 4 (Four) Hours As Needed for Moderate Pain (Pain)., Disp: 20 tablet, Rfl: 0    insulin detemir (LEVEMIR) 100 UNIT/ML injection, Inject 48 Units under the skin into the appropriate area as directed Every Night., Disp: , Rfl:     insulin glargine (LANTUS, SEMGLEE) 100 UNIT/ML injection, Inject 48 Units under the skin into the appropriate area as directed Daily. Every night, Disp: , Rfl:     insulin lispro protamine-insulin lispro (humaLOG 50-50) (50-50) 100 UNIT/ML suspension injection, Inject 6 Units under the skin into the appropriate area as directed 3 (Three) Times a Day., Disp: , Rfl:     Lactobacillus-Inulin ([facility]) capsule, Take 1 tablet by mouth Daily., Disp: , Rfl:     losartan (COZAAR) 50 MG tablet, Take 1 tablet by mouth Daily., Disp: , Rfl: 1    metFORMIN ER (GLUCOPHAGE-XR) 500 MG 24 hr tablet, Take 1 tablet by mouth Daily., Disp: 30 tablet, Rfl: 3    metoprolol succinate XL (TOPROL-XL) 100 MG 24 hr tablet, Take 0.5 tablets by mouth Every Night., Disp: , Rfl:     MULTIPLE VITAMIN PO, Take 1 tablet by mouth Daily., Disp: , Rfl:     potassium chloride (KLOR-CON) 8 MEQ CR tablet, Take 1 tablet by mouth Daily., Disp: 30 tablet, Rfl: 1    Current  "Facility-Administered Medications:     lidocaine (XYLOCAINE) 1 % injection 10 mL, 10 mL, Subcutaneous, Once, Sarai Gauthier MD    lidocaine 1% - EPINEPHrine 1:549418 (XYLOCAINE W/EPI) 1 %-1:633784 injection 10 mL, 10 mL, Injection, Once, Sarai Gauthier MD    Objective     Vital Signs   There were no vitals taken for this visit.     Physical Exam:  Left chest with clean and dry no discharge no erythema, there is a seroma present it was cleaned and aspirated 60 cc of straw-colored fluid, with full resolution.    Results Review:  Result Review :  Lab Results   Component Value Date    FINALDX  08/01/2023     1.  Left breast, mastectomy:  A.  Invasive lobular carcinoma, grade 1, identified at prior biopsy site (18 mm).  B.  Associated lobular carcinoma in situ.  C.  Focal atypical lobular hyperplasia identified in random section from upper outer quadrant of breast.  D.  No evidence of Paget's disease of nipple.  E.  Skin and closest inked (posterior) surgical margin are negative for malignancy.  F.  Prior biopsy site changes including fibrosis and fat necrosis.  G.  Columnar cell changes without atypia.  H.  Usual ductal hyperplasia.  I.  Fibrocystic changes of breast with papillary apocrine change.  J.  Medial calcifications identified in mammary vasculature.  K.  Seborrheic keratoses, skin of breast.    2.  Left axillary sentinel lymph nodes:  A. Four of 4 lymph nodes are negative for metastatic carcinoma.  B.  Absence of micrometastases is confirmed utilizing immunohistochemical stains for pankeratin.    AJCC stage: pT1c pN0      GROSSDES  08/01/2023     1. Breast, Left.   Received in a formalin filled container labeled with the patient's name, date of birth, and \"left breast\".  The specimen is received after hours on 8/1 and processed on the morning of 8/2.  The specimen has a cold ischemic time of 35 minutes and a formalin fixation time of 28-1/2 hours.  The specimen consists of a left breast measuring 18.5 " cm medial to lateral by 14.7 cm superior to inferior by 3.3 cm anterior to posterior and weighing 671 g.  The attached skin ellipse measures 22.5 x 15.3 cm.  The skin surface is marked by multiple tan-brown, slightly raised lesions mostly confined to the inferior-medial quadrant and ranging from 0.2 cm to 0.8 cm in greatest dimension.  The nipple appears unremarkable.  Sectioning through the lesions shows superficial thickening and are otherwise unremarkable.  The posterior margin is yellow-tan, mostly flattened and intact.  The posterior margin is inked black.  Sectioning reveals a jelly filled biopsy cavity in the central 1:00 aspect of the breast.  The biopsy cavity measures 1.4 cm in length by 0.5 cm in diameter.  The biopsy clip is identified within the gelatinous plug.  The biopsy cavity measures 3.5 cm from the posterior margin.  Surrounding the biopsy cavity is a slightly more stellate yellow to gray mass measuring 1.3 x 1.1 x 1.1 cm.  The soft mass measures 3.1 cm from the posterior margin, 5.5 cm from the superior margin, 8.3 cm from the inferior margin, 9.7 cm from the lateral margin, 5.7 cm from the medial margin, 4.8 cm from the skin, and 5.5 cm superior-lateral to the nipple.  Sectioning through the remaining breast reveals yellow-pink, soft and lobulated fibroadipose tissue with thin fibrous septae and evidence of fibrocystic change.  1A-perpendicular section of nipple  1B-representative sections of skin lesions  1C-representative section of unremarkable skin  1D-perpendicular section of posterior margin adjacent to mass  1E through 1H-representative sections of biopsy cavity and mass  1I-random upper outer quadrant  1J-random lower outer quadrant  1K-random upper inner quadrant  1L-random lower inner quadrant    2. [place] Lymph Node.   Received in a formalin filled container labeled with the patient's name, date of birth, and designated sentinel lymph node-left axilla.  The specimen consists of 2  fragments of yellow-pink soft tissue aggregating to 4.5 x 3.5 x 1.4 cm.  Dissection reveals multiple lymph node candidates measuring up to 2.9 cm.  The cut surface of the largest lymph node candidate exhibits 30% fatty replacement with green dye uptake.  The cut surface of the remaining lymph node candidates are unremarkable.  2A through 2D-largest lymph node candidate  2E and 2F-bisected lymph node candidate  2G-1 bisected and 1 intact lymph node candidate           BMI is within normal parameters. No other follow-up for BMI required.    Assessment & Plan     Status post left mastectomy by Dr. Ledezma, postoperative seroma, I will see her back in 3 weeks for repeat evaluation and repeat aspiration if minimal is present we will follow-up at a month from then.  Follow-up with me after Thanksgiving in 3 weeks.    Discussed BMI elevation and need to move toward a reduced BMI for health concerns she appears to understand she is a non smoker and her meds were reviewed.      Julio Preston MD  11/10/23  08:41 CST

## 2023-11-16 DIAGNOSIS — Z17.0 LOBULAR CARCINOMA OF LEFT BREAST, STAGE 1, ESTROGEN RECEPTOR POSITIVE (HCC): Primary | ICD-10-CM

## 2023-11-16 DIAGNOSIS — C50.912 LOBULAR CARCINOMA OF LEFT BREAST, STAGE 1, ESTROGEN RECEPTOR POSITIVE (HCC): Primary | ICD-10-CM

## 2023-11-16 RX ORDER — TAMOXIFEN CITRATE 10 MG/1
10 TABLET ORAL DAILY
Qty: 90 TABLET | Refills: 3 | Status: SHIPPED | OUTPATIENT
Start: 2023-11-16

## 2023-12-05 NOTE — PROGRESS NOTES
Office Established Patient Note:     Referring Provider: Dr. Hung Nicole    Chief complaint 20 weeks out from mastectomy follow-up on a postoperative seroma from Dr. Tonia Dixon    Subjective .     History of present illness:  Pam Clayton is a 78 y.o. female currently 5 weeks out from a left mastectomy with a seroma present, she was aspirated earlier this week and she is here for follow-up complaining of fluid, we will evaluate the area and aspirate as needed.  She saw Brisa last week and he has been aspirated 60 cc out I saw 2 weeks ago and aspirated approximately 100 cc out.  She is back in the office now she states that she has been aspirated at least 5 times, she is frustrated with the situation, she states that her  is also has terminal cancer and she is frustrated with this fluid and also the need for reaspiration.        She is currently here for follow-up her  is in the hospital with his terminal cancer, she is here for follow-up of her seroma from her left chest from Dr Dixon's mastectomy.     She is currently 8 weeks out from mastectomy, I am seeing her weekly due to seroma, she is doing much better, the fullness in the left chest is much reduced, and the area was checked and reaspirated today 40 cc 90 cc last week and 180 the day week before.     She is here for weekly evaluation and aspiration of this seroma following Dr Dixon's mastectomy in early August she is having no problems there seems to be some fluid present probably 25 to 30 cc and we will look toward aspiration after aspiration it turned out 35 cc of blood-tinged fluid with full resolution.     She is currently 2 months out from surgery I am following her for a seroma in the left chest last week I saw her and we aspirated 35 cc of blood-tinged fluid     Currently 3 months out from surgery, I have not seen her for the past month, she does have a seroma back, we will aspirate this ultimately yielded 60 cc of  "fluid.    Currently 4 months out from surgery with Dr. Ledezma, was completed 1 August, she has a seroma that we are following, it has been a month since I last aspirated this she still has some fluid and has questions about the prominence in her left latissimus area.       History  Past Medical History:   Diagnosis Date    Arthritis     Bladder cancer     Bladder cancer    Diabetes mellitus     GERD (gastroesophageal reflux disease)     Hard of hearing     Hx of excision of tumor of brain meninges     Hyperlipidemia     Hypertension     UTI (urinary tract infection)     Vertigo    ,   Past Surgical History:   Procedure Laterality Date    APPENDECTOMY      BRAIN TUMOR EXCISION      CHOLECYSTECTOMY      CYSTOSCOPY BLADDER BIOPSY N/A 7/25/2018    Procedure: CYSTOSCOPY BILATERAL RETROGRADE PYELOGRAM BLADDER BIOPSIES WITH FULGURATION;  Surgeon: Rj Tijerina MD;  Location:  [facility];  Service: Urology    CYSTOSCOPY BLADDER BIOPSY N/A 10/31/2018    Procedure: CYSTOSCOPY BLADDER BIOPSY WITH FULGURATION;  Surgeon: Rj Tijerina MD;  Location:  [facility];  Service: Urology    HYSTERECTOMY      MASTECTOMY W/ SENTINEL NODE BIOPSY Left 8/1/2023    Procedure: BREAST MASTECTOMY WITH MAG SEED GUIDED AND SENTINEL NODE BIOPSY;  Surgeon: Tonia Ledezma MD;  Location:  [facility];  Service: General;  Laterality: Left;    TOTAL KNEE ARTHROPLASTY Bilateral    ,   Family History   Problem Relation Age of Onset    No Known Problems Father     Cancer Mother     Diabetes Mother     Heart disease Mother    ,   Social History     Tobacco Use    Smoking status: Never    Smokeless tobacco: Never   Vaping Use    Vaping Use: Never used   Substance Use Topics    Alcohol use: Yes     Comment: \"One drink a year\"    Drug use: No   , (Not in a hospital admission)   and Allergies:  Halothane, Atorvastatin, Other, Cortisone, Lisinopril, Metformin, Mometasone furoate, Sulfa antibiotics, Tramadol, Codeine, Cortisol manager [cholestatin], " Dexlansoprazole, Levaquin [levofloxacin], and Phenytoin sodium extended    Current Outpatient Medications:     Cetirizine HCl 10 MG capsule, Take 1 tablet by mouth Daily., Disp: , Rfl:     Coenzyme Q10 10 MG capsule, Take 400 mg by mouth Daily., Disp: , Rfl:     esomeprazole (nexIUM) 40 MG capsule, Take 1 capsule by mouth Every Morning Before Breakfast., Disp: , Rfl:     glucosamine-chondroitin 500-400 MG capsule capsule, Take 1 capsule by mouth 2 (Two) Times a Day., Disp: , Rfl:     HYDROcodone-acetaminophen (NORCO) 7.5-325 MG per tablet, Take 1 tablet by mouth Every 4 (Four) Hours As Needed for Moderate Pain (Pain)., Disp: 20 tablet, Rfl: 0    insulin detemir (LEVEMIR) 100 UNIT/ML injection, Inject 48 Units under the skin into the appropriate area as directed Every Night., Disp: , Rfl:     insulin glargine (LANTUS, SEMGLEE) 100 UNIT/ML injection, Inject 48 Units under the skin into the appropriate area as directed Daily. Every night, Disp: , Rfl:     insulin lispro protamine-insulin lispro (humaLOG 50-50) (50-50) 100 UNIT/ML suspension injection, Inject 6 Units under the skin into the appropriate area as directed 3 (Three) Times a Day., Disp: , Rfl:     Lactobacillus-Inulin ([facility] [employer]) capsule, Take 1 tablet by mouth Daily., Disp: , Rfl:     metoprolol succinate XL (TOPROL-XL) 100 MG 24 hr tablet, Take 0.5 tablets by mouth Every Night., Disp: , Rfl:     Objective     Vital Signs   There were no vitals taken for this visit.     Physical Exam:  Chest on the left shows some very minimal redundancy and toward the latissimus, she is advised that this will reduce some but fairly much this will be there permanently.  There is a seroma present we cleaned and aspirated this and yielded 60 cc of straw-colored fluid.  No other fluid was aspirated.  Band-Aid was applied.    Results Review:  Result Review :  Lab Results   Component Value Date    FINALDX  08/01/2023     1.  Left breast, mastectomy:  A.   "Invasive lobular carcinoma, grade 1, identified at prior biopsy site (18 mm).  B.  Associated lobular carcinoma in situ.  C.  Focal atypical lobular hyperplasia identified in random section from upper outer quadrant of breast.  D.  No evidence of Paget's disease of nipple.  E.  Skin and closest inked (posterior) surgical margin are negative for malignancy.  F.  Prior biopsy site changes including fibrosis and fat necrosis.  G.  Columnar cell changes without atypia.  H.  Usual ductal hyperplasia.  I.  Fibrocystic changes of breast with papillary apocrine change.  J.  Medial calcifications identified in mammary vasculature.  K.  Seborrheic keratoses, skin of breast.    2.  Left axillary sentinel lymph nodes:  A. Four of 4 lymph nodes are negative for metastatic carcinoma.  B.  Absence of micrometastases is confirmed utilizing immunohistochemical stains for pankeratin.    AJCC stage: pT1c pN0      GROSSDES  08/01/2023     1. Breast, Left.   Received in a formalin filled container labeled with the patient's name, date of birth, and \"left breast\".  The specimen is received after hours on 8/1 and processed on the morning of 8/2.  The specimen has a cold ischemic time of 35 minutes and a formalin fixation time of 28-1/2 hours.  The specimen consists of a left breast measuring 18.5 cm medial to lateral by 14.7 cm superior to inferior by 3.3 cm anterior to posterior and weighing 671 g.  The attached skin ellipse measures 22.5 x 15.3 cm.  The skin surface is marked by multiple tan-brown, slightly raised lesions mostly confined to the inferior-medial quadrant and ranging from 0.2 cm to 0.8 cm in greatest dimension.  The nipple appears unremarkable.  Sectioning through the lesions shows superficial thickening and are otherwise unremarkable.  The posterior margin is yellow-tan, mostly flattened and intact.  The posterior margin is inked black.  Sectioning reveals a jelly filled biopsy cavity in the central 1:00 aspect of the " breast.  The biopsy cavity measures 1.4 cm in length by 0.5 cm in diameter.  The biopsy clip is identified within the gelatinous plug.  The biopsy cavity measures 3.5 cm from the posterior margin.  Surrounding the biopsy cavity is a slightly more stellate yellow to gray mass measuring 1.3 x 1.1 x 1.1 cm.  The soft mass measures 3.1 cm from the posterior margin, 5.5 cm from the superior margin, 8.3 cm from the inferior margin, 9.7 cm from the lateral margin, 5.7 cm from the medial margin, 4.8 cm from the skin, and 5.5 cm superior-lateral to the nipple.  Sectioning through the remaining breast reveals yellow-pink, soft and lobulated fibroadipose tissue with thin fibrous septae and evidence of fibrocystic change.  1A-perpendicular section of nipple  1B-representative sections of skin lesions  1C-representative section of unremarkable skin  1D-perpendicular section of posterior margin adjacent to mass  1E through 1H-representative sections of biopsy cavity and mass  1I-random upper outer quadrant  1J-random lower outer quadrant  1K-random upper inner quadrant  1L-random lower inner quadrant    2. [place] Lymph Node.   Received in a formalin filled container labeled with the patient's name, date of birth, and designated sentinel lymph node-left axilla.  The specimen consists of 2 fragments of yellow-pink soft tissue aggregating to 4.5 x 3.5 x 1.4 cm.  Dissection reveals multiple lymph node candidates measuring up to 2.9 cm.  The cut surface of the largest lymph node candidate exhibits 30% fatty replacement with green dye uptake.  The cut surface of the remaining lymph node candidates are unremarkable.  2A through 2D-largest lymph node candidate  2E and 2F-bisected lymph node candidate  2G-1 bisected and 1 intact lymph node candidate           BMI is within normal parameters. No other follow-up for BMI required.    Assessment & Plan     Status post evaluation and drainage of a recurrent seroma 4 months out from surgery,  60 cc out, I will see her back in early January, at 1 month follow-up to make sure and to treat any recurrent seroma.  She has a ill  that is in hospice, she does not expect him to survive the holidays.    Discussed BMI elevation and need to move toward a reduced BMI for health concerns she appears to understand she is a non smoker and her meds were reviewed.      Julio Preston MD  12/05/23  15:20 CST

## 2023-12-06 ENCOUNTER — TRANSCRIBE ORDERS (OUTPATIENT)
Dept: ADMINISTRATIVE | Facility: HOSPITAL | Age: 78
End: 2023-12-06
Payer: MEDICARE

## 2023-12-06 ENCOUNTER — OFFICE VISIT (OUTPATIENT)
Dept: SURGERY | Facility: CLINIC | Age: 78
End: 2023-12-06
Payer: MEDICARE

## 2023-12-06 ENCOUNTER — LAB (OUTPATIENT)
Dept: LAB | Facility: HOSPITAL | Age: 78
End: 2023-12-06
Payer: MEDICARE

## 2023-12-06 VITALS — OXYGEN SATURATION: 98 % | HEART RATE: 73 BPM

## 2023-12-06 DIAGNOSIS — E11.65 TYPE II DIABETES MELLITUS WITH HYPEROSMOLARITY, UNCONTROLLED: ICD-10-CM

## 2023-12-06 DIAGNOSIS — E11.65 TYPE II DIABETES MELLITUS WITH HYPEROSMOLARITY, UNCONTROLLED: Primary | ICD-10-CM

## 2023-12-06 DIAGNOSIS — C50.912 MALIGNANT NEOPLASM OF LEFT FEMALE BREAST, UNSPECIFIED ESTROGEN RECEPTOR STATUS, UNSPECIFIED SITE OF BREAST: Primary | ICD-10-CM

## 2023-12-06 DIAGNOSIS — D09.0 CIS (CARCINOMA IN SITU OF BLADDER): ICD-10-CM

## 2023-12-06 DIAGNOSIS — E11.00 TYPE II DIABETES MELLITUS WITH HYPEROSMOLARITY, UNCONTROLLED: ICD-10-CM

## 2023-12-06 DIAGNOSIS — E11.00 TYPE II DIABETES MELLITUS WITH HYPEROSMOLARITY, UNCONTROLLED: Primary | ICD-10-CM

## 2023-12-06 LAB
ALBUMIN SERPL-MCNC: 4.3 G/DL (ref 3.5–5.2)
ALBUMIN/GLOB SERPL: 1.5 G/DL
ALP SERPL-CCNC: 89 U/L (ref 39–117)
ALT SERPL W P-5'-P-CCNC: 8 U/L (ref 1–33)
ANION GAP SERPL CALCULATED.3IONS-SCNC: 11 MMOL/L (ref 5–15)
AST SERPL-CCNC: 14 U/L (ref 1–32)
BASOPHILS # BLD AUTO: 0.12 10*3/MM3 (ref 0–0.2)
BASOPHILS NFR BLD AUTO: 1.1 % (ref 0–1.5)
BILIRUB SERPL-MCNC: 0.3 MG/DL (ref 0–1.2)
BUN SERPL-MCNC: 12 MG/DL (ref 8–23)
BUN/CREAT SERPL: 14.1 (ref 7–25)
CALCIUM SPEC-SCNC: 9.4 MG/DL (ref 8.6–10.5)
CHLORIDE SERPL-SCNC: 102 MMOL/L (ref 98–107)
CO2 SERPL-SCNC: 25 MMOL/L (ref 22–29)
CREAT SERPL-MCNC: 0.85 MG/DL (ref 0.57–1)
DEPRECATED RDW RBC AUTO: 43.6 FL (ref 37–54)
EGFRCR SERPLBLD CKD-EPI 2021: 71.1 ML/MIN/1.73
EOSINOPHIL # BLD AUTO: 0.44 10*3/MM3 (ref 0–0.4)
EOSINOPHIL NFR BLD AUTO: 4 % (ref 0.3–6.2)
ERYTHROCYTE [DISTWIDTH] IN BLOOD BY AUTOMATED COUNT: 13.3 % (ref 12.3–15.4)
GLOBULIN UR ELPH-MCNC: 2.8 GM/DL
GLUCOSE SERPL-MCNC: 129 MG/DL (ref 65–99)
HBA1C MFR BLD: 7.3 % (ref 4.8–5.6)
HCT VFR BLD AUTO: 41.3 % (ref 34–46.6)
HGB BLD-MCNC: 12.8 G/DL (ref 12–15.9)
IMM GRANULOCYTES # BLD AUTO: 0.03 10*3/MM3 (ref 0–0.05)
IMM GRANULOCYTES NFR BLD AUTO: 0.3 % (ref 0–0.5)
LYMPHOCYTES # BLD AUTO: 3.48 10*3/MM3 (ref 0.7–3.1)
LYMPHOCYTES NFR BLD AUTO: 31.8 % (ref 19.6–45.3)
MCH RBC QN AUTO: 27.8 PG (ref 26.6–33)
MCHC RBC AUTO-ENTMCNC: 31 G/DL (ref 31.5–35.7)
MCV RBC AUTO: 89.8 FL (ref 79–97)
MONOCYTES # BLD AUTO: 0.49 10*3/MM3 (ref 0.1–0.9)
MONOCYTES NFR BLD AUTO: 4.5 % (ref 5–12)
NEUTROPHILS NFR BLD AUTO: 58.3 % (ref 42.7–76)
NEUTROPHILS NFR BLD AUTO: 6.37 10*3/MM3 (ref 1.7–7)
NRBC BLD AUTO-RTO: 0 /100 WBC (ref 0–0.2)
PLATELET # BLD AUTO: 300 10*3/MM3 (ref 140–450)
PMV BLD AUTO: 11.4 FL (ref 6–12)
POTASSIUM SERPL-SCNC: 4.3 MMOL/L (ref 3.5–5.2)
PROT SERPL-MCNC: 7.1 G/DL (ref 6–8.5)
RBC # BLD AUTO: 4.6 10*6/MM3 (ref 3.77–5.28)
SODIUM SERPL-SCNC: 138 MMOL/L (ref 136–145)
WBC NRBC COR # BLD AUTO: 10.93 10*3/MM3 (ref 3.4–10.8)

## 2023-12-06 PROCEDURE — 85025 COMPLETE CBC W/AUTO DIFF WBC: CPT

## 2023-12-06 PROCEDURE — 80053 COMPREHEN METABOLIC PANEL: CPT

## 2023-12-06 PROCEDURE — 1159F MED LIST DOCD IN RCRD: CPT | Performed by: SPECIALIST

## 2023-12-06 PROCEDURE — 1160F RVW MEDS BY RX/DR IN RCRD: CPT | Performed by: SPECIALIST

## 2023-12-06 PROCEDURE — 99024 POSTOP FOLLOW-UP VISIT: CPT | Performed by: SPECIALIST

## 2023-12-06 PROCEDURE — 36415 COLL VENOUS BLD VENIPUNCTURE: CPT

## 2023-12-06 PROCEDURE — 83036 HEMOGLOBIN GLYCOSYLATED A1C: CPT

## 2024-01-10 ENCOUNTER — OFFICE VISIT (OUTPATIENT)
Dept: SURGERY | Facility: CLINIC | Age: 79
End: 2024-01-10
Payer: MEDICARE

## 2024-01-10 VITALS
HEIGHT: 66 IN | BODY MASS INDEX: 24.11 KG/M2 | WEIGHT: 150 LBS | TEMPERATURE: 98.2 F | DIASTOLIC BLOOD PRESSURE: 61 MMHG | SYSTOLIC BLOOD PRESSURE: 134 MMHG | OXYGEN SATURATION: 98 % | HEART RATE: 96 BPM

## 2024-01-10 DIAGNOSIS — C50.912 MALIGNANT NEOPLASM OF LEFT FEMALE BREAST, UNSPECIFIED ESTROGEN RECEPTOR STATUS, UNSPECIFIED SITE OF BREAST: Primary | ICD-10-CM

## 2024-01-10 PROCEDURE — 1160F RVW MEDS BY RX/DR IN RCRD: CPT | Performed by: SPECIALIST

## 2024-01-10 PROCEDURE — 3078F DIAST BP <80 MM HG: CPT | Performed by: SPECIALIST

## 2024-01-10 PROCEDURE — 1159F MED LIST DOCD IN RCRD: CPT | Performed by: SPECIALIST

## 2024-01-10 PROCEDURE — 99024 POSTOP FOLLOW-UP VISIT: CPT | Performed by: SPECIALIST

## 2024-01-10 PROCEDURE — 3075F SYST BP GE 130 - 139MM HG: CPT | Performed by: SPECIALIST

## 2024-01-10 NOTE — PATIENT INSTRUCTIONS
I am so sorry for your loss Ms. Clayton, I will see you back in 6 weeks we will recheck the area and aspirate as needed.

## 2024-01-10 NOTE — PROGRESS NOTES
Office Established Patient Note:     Referring Provider: Dr. Hung Nicole    Chief complaint follow-up 5 months out from left mastectomy intermittent seroma    Subjective .     History of present illness:Pam Clayton is a 78 y.o. female currently 5 weeks out from a left mastectomy with a seroma present, she was aspirated earlier this week and she is here for follow-up complaining of fluid, we will evaluate the area and aspirate as needed.  She saw Brisa last week and he has been aspirated 60 cc out I saw 2 weeks ago and aspirated approximately 100 cc out.  She is back in the office now she states that she has been aspirated at least 5 times, she is frustrated with the situation, she states that her  is also has terminal cancer and she is frustrated with this fluid and also the need for reaspiration.        She is currently here for follow-up her  is in the hospital with his terminal cancer, she is here for follow-up of her seroma from her left chest from Dr Dixon's mastectomy.     She is currently 8 weeks out from mastectomy, I am seeing her weekly due to seroma, she is doing much better, the fullness in the left chest is much reduced, and the area was checked and reaspirated today 40 cc 90 cc last week and 180 the day week before.     She is here for weekly evaluation and aspiration of this seroma following Dr Dixon's mastectomy in early August she is having no problems there seems to be some fluid present probably 25 to 30 cc and we will look toward aspiration after aspiration it turned out 35 cc of blood-tinged fluid with full resolution.     She is currently 2 months out from surgery I am following her for a seroma in the left chest last week I saw her and we aspirated 35 cc of blood-tinged fluid     Currently 3 months out from surgery, I have not seen her for the past month, she does have a seroma back, we will aspirate this ultimately yielded 60 cc of fluid.     Currently 4 months out from  "surgery with Dr. Ledezma, was completed 1 August, she has a seroma that we are following, it has been a month since I last aspirated this she still has some fluid and has questions about the prominence in her left latissimus area.       Currently 5 months out from left mastectomy, she has a recurrent seroma, I last saw her a month ago and had approximately 60 cc of material out, she is here for follow-up she recently lost her  this morning.  After a long illness.    History  Past Medical History:   Diagnosis Date    Arthritis     Bladder cancer     Bladder cancer    Diabetes mellitus     GERD (gastroesophageal reflux disease)     Hard of hearing     Hx of excision of tumor of brain meninges     Hyperlipidemia     Hypertension     UTI (urinary tract infection)     Vertigo    ,   Past Surgical History:   Procedure Laterality Date    APPENDECTOMY      BRAIN TUMOR EXCISION      CHOLECYSTECTOMY      CYSTOSCOPY BLADDER BIOPSY N/A 7/25/2018    Procedure: CYSTOSCOPY BILATERAL RETROGRADE PYELOGRAM BLADDER BIOPSIES WITH FULGURATION;  Surgeon: Rj Tijerina MD;  Location: [facility] OR;  Service: Urology    CYSTOSCOPY BLADDER BIOPSY N/A 10/31/2018    Procedure: CYSTOSCOPY BLADDER BIOPSY WITH FULGURATION;  Surgeon: Rj Tijerina MD;  Location: [facility] OR;  Service: Urology    HYSTERECTOMY      MASTECTOMY W/ SENTINEL NODE BIOPSY Left 8/1/2023    Procedure: BREAST MASTECTOMY WITH MAG SEED GUIDED AND SENTINEL NODE BIOPSY;  Surgeon: Tonia Ledezma MD;  Location: [facility] OR;  Service: General;  Laterality: Left;    TOTAL KNEE ARTHROPLASTY Bilateral    ,   Family History   Problem Relation Age of Onset    No Known Problems Father     Cancer Mother     Diabetes Mother     Heart disease Mother    ,   Social History     Tobacco Use    Smoking status: Never    Smokeless tobacco: Never   Vaping Use    Vaping Use: Never used   Substance Use Topics    Alcohol use: Yes     Comment: \"One drink a year\"    Drug use: No   , (Not " in a hospital admission)   and Allergies:  Halothane, Atorvastatin, Other, Cortisone, Lisinopril, Metformin, Mometasone furoate, Sulfa antibiotics, Tramadol, Codeine, Cortisol manager [cholestatin], Dexlansoprazole, Levaquin [levofloxacin], and Phenytoin sodium extended    Current Outpatient Medications:     Cetirizine HCl 10 MG capsule, Take 1 tablet by mouth Daily., Disp: , Rfl:     Coenzyme Q10 10 MG capsule, Take 400 mg by mouth Daily., Disp: , Rfl:     esomeprazole (nexIUM) 40 MG capsule, Take 1 capsule by mouth Every Morning Before Breakfast., Disp: , Rfl:     glucosamine-chondroitin 500-400 MG capsule capsule, Take 1 capsule by mouth 2 (Two) Times a Day., Disp: , Rfl:     HYDROcodone-acetaminophen (NORCO) 7.5-325 MG per tablet, Take 1 tablet by mouth Every 4 (Four) Hours As Needed for Moderate Pain (Pain)., Disp: 20 tablet, Rfl: 0    insulin detemir (LEVEMIR) 100 UNIT/ML injection, Inject 48 Units under the skin into the appropriate area as directed Every Night., Disp: , Rfl:     insulin glargine (LANTUS, SEMGLEE) 100 UNIT/ML injection, Inject 48 Units under the skin into the appropriate area as directed Daily. Every night, Disp: , Rfl:     insulin lispro protamine-insulin lispro (humaLOG 50-50) (50-50) 100 UNIT/ML suspension injection, Inject 6 Units under the skin into the appropriate area as directed 3 (Three) Times a Day., Disp: , Rfl:     Lactobacillus-Inulin ([facility] [email or web address]) capsule, Take 1 tablet by mouth Daily., Disp: , Rfl:     metoprolol succinate XL (TOPROL-XL) 100 MG 24 hr tablet, Take 0.5 tablets by mouth Every Night., Disp: , Rfl:     Objective     Vital Signs   There were no vitals taken for this visit.     Physical Exam:  Left chest shows a nicely healed incision, there is a slight amount of fullness noted, consistent with a seroma, the area was cleaned and aspirated, aspiration of 50 cc of straw-colored fluid.  Full resolution of the cavity occurred.    Results Review:  Result  "Review :  Lab Results   Component Value Date    FINALDX  08/01/2023     1.  Left breast, mastectomy:  A.  Invasive lobular carcinoma, grade 1, identified at prior biopsy site (18 mm).  B.  Associated lobular carcinoma in situ.  C.  Focal atypical lobular hyperplasia identified in random section from upper outer quadrant of breast.  D.  No evidence of Paget's disease of nipple.  E.  Skin and closest inked (posterior) surgical margin are negative for malignancy.  F.  Prior biopsy site changes including fibrosis and fat necrosis.  G.  Columnar cell changes without atypia.  H.  Usual ductal hyperplasia.  I.  Fibrocystic changes of breast with papillary apocrine change.  J.  Medial calcifications identified in mammary vasculature.  K.  Seborrheic keratoses, skin of breast.    2.  Left axillary sentinel lymph nodes:  A. Four of 4 lymph nodes are negative for metastatic carcinoma.  B.  Absence of micrometastases is confirmed utilizing immunohistochemical stains for pankeratin.    AJCC stage: pT1c pN0      GROSSDES  08/01/2023     1. Breast, Left.   Received in a formalin filled container labeled with the patient's name, date of birth, and \"left breast\".  The specimen is received after hours on 8/1 and processed on the morning of 8/2.  The specimen has a cold ischemic time of 35 minutes and a formalin fixation time of 28-1/2 hours.  The specimen consists of a left breast measuring 18.5 cm medial to lateral by 14.7 cm superior to inferior by 3.3 cm anterior to posterior and weighing 671 g.  The attached skin ellipse measures 22.5 x 15.3 cm.  The skin surface is marked by multiple tan-brown, slightly raised lesions mostly confined to the inferior-medial quadrant and ranging from 0.2 cm to 0.8 cm in greatest dimension.  The nipple appears unremarkable.  Sectioning through the lesions shows superficial thickening and are otherwise unremarkable.  The posterior margin is yellow-tan, mostly flattened and intact.  The posterior " margin is inked black.  Sectioning reveals a jelly filled biopsy cavity in the central 1:00 aspect of the breast.  The biopsy cavity measures 1.4 cm in length by 0.5 cm in diameter.  The biopsy clip is identified within the gelatinous plug.  The biopsy cavity measures 3.5 cm from the posterior margin.  Surrounding the biopsy cavity is a slightly more stellate yellow to gray mass measuring 1.3 x 1.1 x 1.1 cm.  The soft mass measures 3.1 cm from the posterior margin, 5.5 cm from the superior margin, 8.3 cm from the inferior margin, 9.7 cm from the lateral margin, 5.7 cm from the medial margin, 4.8 cm from the skin, and 5.5 cm superior-lateral to the nipple.  Sectioning through the remaining breast reveals yellow-pink, soft and lobulated fibroadipose tissue with thin fibrous septae and evidence of fibrocystic change.  1A-perpendicular section of nipple  1B-representative sections of skin lesions  1C-representative section of unremarkable skin  1D-perpendicular section of posterior margin adjacent to mass  1E through 1H-representative sections of biopsy cavity and mass  1I-random upper outer quadrant  1J-random lower outer quadrant  1K-random upper inner quadrant  1L-random lower inner quadrant    2. [place] Lymph Node.   Received in a formalin filled container labeled with the patient's name, date of birth, and designated sentinel lymph node-left axilla.  The specimen consists of 2 fragments of yellow-pink soft tissue aggregating to 4.5 x 3.5 x 1.4 cm.  Dissection reveals multiple lymph node candidates measuring up to 2.9 cm.  The cut surface of the largest lymph node candidate exhibits 30% fatty replacement with green dye uptake.  The cut surface of the remaining lymph node candidates are unremarkable.  2A through 2D-largest lymph node candidate  2E and 2F-bisected lymph node candidate  2G-1 bisected and 1 intact lymph node candidate           BMI is within normal parameters. No other follow-up for BMI  required.    Assessment & Plan       Status post left breast mastectomy presently doing well, follow-up with me in 6 weeks for repeat evaluation repeat aspiration.  We can try to reduce the seroma but I would need to place her on every other day HCTZ, and frequent aspirations and this has not been desire at this point if she desires to try to fully clear this up I would need her on hydrochlorothiazide couple times a week and frequent aspirations we will see if she wants to do this now that her  has passed.    Discussed BMI elevation and need to move toward a reduced BMI for health concerns she appears to understand she is a non smoker and her meds were reviewed.      Julio Preston MD  01/10/24  07:44 CST

## 2024-02-19 NOTE — PROGRESS NOTES
MEDICAL ONCOLOGY PROGRESS NOTE    Pt Name: Jenna Oneal  MRN: [id number]  YOB: 1945  Date of evaluation: 2/22/2024    HISTORY OF PRESENT ILLNESS:    Reason for MD visit-surveillance follow-up  Patient has been diagnosed with stage I IDC of the left breast status post left breast mastectomy.  She is currently receiving endocrine therapy with tamoxifen.  She has been tolerating treatment well.  She denies any new complaint this denies any new breast complaints.  She is still undergoing drainage of seroma from prior mastectomy site.    Diagnosis  Adrenal adenoma, 2018  Invasive lobular carcinoma, left upper breast, July 2023  Associated LCIS  Grade 1  [id number], stage IA  ER 62%, ND 66%, HER-2 0/negative, Ki67 5%      Treatment Summary  8/1/23 Left breast mastectomy with negative (0/4) SLNB by Dr Toshia Levy/[facility]  8/17/23 Initiate endocrine hormone therapy with Tamoxifen 10mg daily    Cancer History  Jenna Oneal was first seen by me on 8/17/2023.  Patient was referred for a diagnosis of stage I invasive bladder carcinoma of the left breast status post left mastectomy by Dr. Sampson at [facility].  6/13/23 CT abd/pelvis ([facility]): No urinary calculi. No enlarged lymph nodes. 3.4 x 2.9cm right adrenal gland mass has grown slightly since prior study done in 2018 that measured 2.6 x 2.4cm. Up to 40% of adrenal adenomas may grow.   6/19/23 Bilateral screening mammogram ([facility]): The right breast appears normal. There is a spiculated density within the mid upper left breast.  6/19/23 US left breast ([facility]): Within the left breast at the 1 o'clock position 2cm from the nipple is a spiculated hypoechoic mass with posterior acoustic shadowing measuring 6 x 5 x 7mm. No internal vascularity. There is adjacent 3mm oval, parallel, hypoechoic mass less than 1cm medial to this mass. Left axillary demonstrates morphologically normal appearing lymph nodes.   7/7/23 Breast, left 1:00,

## 2024-02-20 NOTE — PROGRESS NOTES
Office Established Patient Note:     Referring Provider: Dr. Hung Nicole    Chief complaint follow-up 6 months out from left mastectomy with intermittent seroma    Subjective .     History of present illness: Pam Clayton is a 78 y.o. female currently 5 weeks out from a left mastectomy with a seroma present, she was aspirated earlier this week and she is here for follow-up complaining of fluid, we will evaluate the area and aspirate as needed.  She saw Brisa last week and he has been aspirated 60 cc out I saw 2 weeks ago and aspirated approximately 100 cc out.  She is back in the office now she states that she has been aspirated at least 5 times, she is frustrated with the situation, she states that her  is also has terminal cancer and she is frustrated with this fluid and also the need for reaspiration.        She is currently here for follow-up her  is in the hospital with his terminal cancer, she is here for follow-up of her seroma from her left chest from Dr Dixon's mastectomy.     She is currently 8 weeks out from mastectomy, I am seeing her weekly due to seroma, she is doing much better, the fullness in the left chest is much reduced, and the area was checked and reaspirated today 40 cc 90 cc last week and 180 the day week before.     She is here for weekly evaluation and aspiration of this seroma following Dr Dixon's mastectomy in early August she is having no problems there seems to be some fluid present probably 25 to 30 cc and we will look toward aspiration after aspiration it turned out 35 cc of blood-tinged fluid with full resolution.     She is currently 2 months out from surgery I am following her for a seroma in the left chest last week I saw her and we aspirated 35 cc of blood-tinged fluid     Currently 3 months out from surgery, I have not seen her for the past month, she does have a seroma back, we will aspirate this ultimately yielded 60 cc of fluid.     Currently 4 months  out from surgery with Dr. Ledezma, was completed 1 August, she has a seroma that we are following, it has been a month since I last aspirated this she still has some fluid and has questions about the prominence in her left latissimus area.        Currently 5 months out from left mastectomy, she has a recurrent seroma, I last saw her a month ago and had approximately 60 cc of material out, she is here for follow-up she recently lost her  this morning.  After a long illness.    Currently she is 6 months out from mastectomy, continues to have a recurrent seroma it is asymptomatic.  She has had 16 separate drains of this area she states and she is here for follow-up.  She does have a seroma it is totally asymptomatic and seems to be 50 to 60 cc on examination, no erythema and no particular problems.  She lost her  a month ago and she is slowly improving and returning to normal from this.    History  Past Medical History:   Diagnosis Date    Arthritis     Bladder cancer     Bladder cancer    Diabetes mellitus     GERD (gastroesophageal reflux disease)     Hard of hearing     Hx of excision of tumor of brain meninges     Hyperlipidemia     Hypertension     UTI (urinary tract infection)     Vertigo    ,   Past Surgical History:   Procedure Laterality Date    APPENDECTOMY      BRAIN TUMOR EXCISION      CHOLECYSTECTOMY      CYSTOSCOPY BLADDER BIOPSY N/A 7/25/2018    Procedure: CYSTOSCOPY BILATERAL RETROGRADE PYELOGRAM BLADDER BIOPSIES WITH FULGURATION;  Surgeon: Rj Tijerina MD;  Location: [facility] OR;  Service: Urology    CYSTOSCOPY BLADDER BIOPSY N/A 10/31/2018    Procedure: CYSTOSCOPY BLADDER BIOPSY WITH FULGURATION;  Surgeon: Rj Tijerina MD;  Location:  [facility] OR;  Service: Urology    HYSTERECTOMY      MASTECTOMY W/ SENTINEL NODE BIOPSY Left 8/1/2023    Procedure: BREAST MASTECTOMY WITH MAG SEED GUIDED AND SENTINEL NODE BIOPSY;  Surgeon: Tonia Ledezma MD;  Location:  [facility] OR;  Service:  "General;  Laterality: Left;    TOTAL KNEE ARTHROPLASTY Bilateral    ,   Family History   Problem Relation Age of Onset    No Known Problems Father     Cancer Mother     Diabetes Mother     Heart disease Mother    ,   Social History     Tobacco Use    Smoking status: Never    Smokeless tobacco: Never   Vaping Use    Vaping Use: Never used   Substance Use Topics    Alcohol use: Yes     Comment: \"One drink a year\"    Drug use: No   , (Not in a hospital admission)   and Allergies:  Halothane, Atorvastatin, Other, Cortisone, Lisinopril, Metformin, Mometasone furoate, Sulfa antibiotics, Tramadol, Codeine, Cortisol manager [cholestatin], Dexlansoprazole, Levaquin [levofloxacin], and Phenytoin sodium extended    Current Outpatient Medications:     Cetirizine HCl 10 MG capsule, Take 1 tablet by mouth Daily., Disp: , Rfl:     Coenzyme Q10 10 MG capsule, Take 400 mg by mouth Daily., Disp: , Rfl:     esomeprazole (nexIUM) 40 MG capsule, Take 1 capsule by mouth Every Morning Before Breakfast., Disp: , Rfl:     glucosamine-chondroitin 500-400 MG capsule capsule, Take 1 capsule by mouth 2 (Two) Times a Day., Disp: , Rfl:     HYDROcodone-acetaminophen (NORCO) 7.5-325 MG per tablet, Take 1 tablet by mouth Every 4 (Four) Hours As Needed for Moderate Pain (Pain)., Disp: 20 tablet, Rfl: 0    insulin detemir (LEVEMIR) 100 UNIT/ML injection, Inject 48 Units under the skin into the appropriate area as directed Every Night., Disp: , Rfl:     insulin glargine (LANTUS, SEMGLEE) 100 UNIT/ML injection, Inject 48 Units under the skin into the appropriate area as directed Daily. Every night, Disp: , Rfl:     insulin lispro protamine-insulin lispro (humaLOG 50-50) (50-50) 100 UNIT/ML suspension injection, Inject 6 Units under the skin into the appropriate area as directed 3 (Three) Times a Day., Disp: , Rfl:     Lactobacillus-Inulin ([facility] [employer]) capsule, Take 1 tablet by mouth Daily., Disp: , Rfl:     metoprolol succinate XL " "(TOPROL-XL) 100 MG 24 hr tablet, Take 0.5 tablets by mouth Every Night., Disp: , Rfl:     Objective     Vital Signs   There were no vitals taken for this visit.     Physical Exam:  Respirations clear and equal    Well-healed surgical incision in the left chest, there is a seroma present in the central aspect, the area was cleaned and prepped and aspirated with 60 cc of straw-colored fluid removed no blood no cloudiness no sign of any infection.    Her last mammogram on her right side was in June she will have another mammogram set up for June and follow-up with her regular physician Dr. Nicole.    Results Review:  Result Review :  Lab Results   Component Value Date    FINALDX  08/01/2023     1.  Left breast, mastectomy:  A.  Invasive lobular carcinoma, grade 1, identified at prior biopsy site (18 mm).  B.  Associated lobular carcinoma in situ.  C.  Focal atypical lobular hyperplasia identified in random section from upper outer quadrant of breast.  D.  No evidence of Paget's disease of nipple.  E.  Skin and closest inked (posterior) surgical margin are negative for malignancy.  F.  Prior biopsy site changes including fibrosis and fat necrosis.  G.  Columnar cell changes without atypia.  H.  Usual ductal hyperplasia.  I.  Fibrocystic changes of breast with papillary apocrine change.  J.  Medial calcifications identified in mammary vasculature.  K.  Seborrheic keratoses, skin of breast.    2.  Left axillary sentinel lymph nodes:  A. Four of 4 lymph nodes are negative for metastatic carcinoma.  B.  Absence of micrometastases is confirmed utilizing immunohistochemical stains for pankeratin.    AJCC stage: pT1c pN0      GROSSDES  08/01/2023     1. Breast, Left.   Received in a formalin filled container labeled with the patient's name, date of birth, and \"left breast\".  The specimen is received after hours on 8/1 and processed on the morning of 8/2.  The specimen has a cold ischemic time of 35 minutes and a formalin " fixation time of 28-1/2 hours.  The specimen consists of a left breast measuring 18.5 cm medial to lateral by 14.7 cm superior to inferior by 3.3 cm anterior to posterior and weighing 671 g.  The attached skin ellipse measures 22.5 x 15.3 cm.  The skin surface is marked by multiple tan-brown, slightly raised lesions mostly confined to the inferior-medial quadrant and ranging from 0.2 cm to 0.8 cm in greatest dimension.  The nipple appears unremarkable.  Sectioning through the lesions shows superficial thickening and are otherwise unremarkable.  The posterior margin is yellow-tan, mostly flattened and intact.  The posterior margin is inked black.  Sectioning reveals a jelly filled biopsy cavity in the central 1:00 aspect of the breast.  The biopsy cavity measures 1.4 cm in length by 0.5 cm in diameter.  The biopsy clip is identified within the gelatinous plug.  The biopsy cavity measures 3.5 cm from the posterior margin.  Surrounding the biopsy cavity is a slightly more stellate yellow to gray mass measuring 1.3 x 1.1 x 1.1 cm.  The soft mass measures 3.1 cm from the posterior margin, 5.5 cm from the superior margin, 8.3 cm from the inferior margin, 9.7 cm from the lateral margin, 5.7 cm from the medial margin, 4.8 cm from the skin, and 5.5 cm superior-lateral to the nipple.  Sectioning through the remaining breast reveals yellow-pink, soft and lobulated fibroadipose tissue with thin fibrous septae and evidence of fibrocystic change.  1A-perpendicular section of nipple  1B-representative sections of skin lesions  1C-representative section of unremarkable skin  1D-perpendicular section of posterior margin adjacent to mass  1E through 1H-representative sections of biopsy cavity and mass  1I-random upper outer quadrant  1J-random lower outer quadrant  1K-random upper inner quadrant  1L-random lower inner quadrant    2. [place] Lymph Node.   Received in a formalin filled container labeled with the patient's name, date  of birth, and designated sentinel lymph node-left axilla.  The specimen consists of 2 fragments of yellow-pink soft tissue aggregating to 4.5 x 3.5 x 1.4 cm.  Dissection reveals multiple lymph node candidates measuring up to 2.9 cm.  The cut surface of the largest lymph node candidate exhibits 30% fatty replacement with green dye uptake.  The cut surface of the remaining lymph node candidates are unremarkable.  2A through 2D-largest lymph node candidate  2E and 2F-bisected lymph node candidate  2G-1 bisected and 1 intact lymph node candidate           BMI is within normal parameters. No other follow-up for BMI required.    Assessment & Plan     Status postmastectomy in August she has had continued recurrent seroma noted, these seromas have been aspirated, multiple times, with minimal change.  She is advised that we can remove the seroma and place a drain to help reduce seroma formation but at present she does not desire that to be completed I will see her 1 further time in 6 weeks for repeat evaluation prior to nursing home and aspirate any further seroma she will have a left mammogram in June, and follow-up with her physician Dr. Nicole and referred back if she should desire the seroma to be removed or reaspirated.    Discussed BMI elevation and need to move toward a reduced BMI for health concerns she appears to understand she is a non smoker and her meds were reviewed.      Julio Preston MD  02/20/24  17:16 CST

## 2024-02-21 ENCOUNTER — OFFICE VISIT (OUTPATIENT)
Dept: SURGERY | Facility: CLINIC | Age: 79
End: 2024-02-21
Payer: MEDICARE

## 2024-02-21 ENCOUNTER — TELEPHONE (OUTPATIENT)
Dept: HEMATOLOGY | Age: 79
End: 2024-02-21

## 2024-02-21 VITALS
WEIGHT: 153.8 LBS | BODY MASS INDEX: 24.72 KG/M2 | HEART RATE: 64 BPM | HEIGHT: 66 IN | OXYGEN SATURATION: 96 % | DIASTOLIC BLOOD PRESSURE: 71 MMHG | SYSTOLIC BLOOD PRESSURE: 117 MMHG

## 2024-02-21 DIAGNOSIS — D09.0 CIS (CARCINOMA IN SITU OF BLADDER): Primary | ICD-10-CM

## 2024-02-21 DIAGNOSIS — C50.912 MALIGNANT NEOPLASM OF LEFT FEMALE BREAST, UNSPECIFIED ESTROGEN RECEPTOR STATUS, UNSPECIFIED SITE OF BREAST: ICD-10-CM

## 2024-02-21 NOTE — TELEPHONE ENCOUNTER
Called patient and reminded patient of their appointment on 2/22/2024 and patient confirmed they would be here.

## 2024-02-21 NOTE — PATIENT INSTRUCTIONS
Thanks for coming today Ms. Clayton like everything looks okay thank you for sharing the article about your , Josiah.  I will see you back in mid April your problem may be one of my last patient's but we will make sure there is no seroma and I will cut you loose at that time.!

## 2024-02-22 ENCOUNTER — HOSPITAL ENCOUNTER (OUTPATIENT)
Dept: INFUSION THERAPY | Age: 79
Discharge: HOME | End: 2024-02-22
Payer: MEDICARE

## 2024-02-22 ENCOUNTER — OFFICE VISIT (OUTPATIENT)
Dept: HEMATOLOGY | Age: 79
End: 2024-02-22

## 2024-02-22 VITALS
WEIGHT: 153 LBS | BODY MASS INDEX: 24.59 KG/M2 | HEART RATE: 70 BPM | SYSTOLIC BLOOD PRESSURE: 132 MMHG | DIASTOLIC BLOOD PRESSURE: 72 MMHG | HEIGHT: 66 IN | OXYGEN SATURATION: 97 % | TEMPERATURE: 98 F

## 2024-02-22 DIAGNOSIS — C50.912 LOBULAR CARCINOMA OF LEFT BREAST (HCC): ICD-10-CM

## 2024-02-22 DIAGNOSIS — D35.01 ADENOMA OF RIGHT ADRENAL GLAND: ICD-10-CM

## 2024-02-22 DIAGNOSIS — Z51.81 ENCOUNTER FOR MONITORING TAMOXIFEN THERAPY: ICD-10-CM

## 2024-02-22 DIAGNOSIS — Z79.810 ENCOUNTER FOR MONITORING TAMOXIFEN THERAPY: ICD-10-CM

## 2024-02-22 DIAGNOSIS — D05.02 LOBULAR CARCINOMA IN SITU (LCIS) OF LEFT BREAST: ICD-10-CM

## 2024-02-22 DIAGNOSIS — Z17.0 LOBULAR CARCINOMA OF LEFT BREAST, STAGE 1, ESTROGEN RECEPTOR POSITIVE (HCC): Primary | ICD-10-CM

## 2024-02-22 DIAGNOSIS — Z71.89 CARE PLAN DISCUSSED WITH PATIENT: ICD-10-CM

## 2024-02-22 DIAGNOSIS — C50.912 LOBULAR CARCINOMA OF LEFT BREAST, STAGE 1, ESTROGEN RECEPTOR POSITIVE (HCC): Primary | ICD-10-CM

## 2024-02-22 PROCEDURE — 99212 OFFICE O/P EST SF 10 MIN: CPT

## 2024-02-22 RX ORDER — INSULIN GLARGINE 100 [IU]/ML
26 INJECTION, SOLUTION SUBCUTANEOUS NIGHTLY
COMMUNITY
Start: 2020-01-03

## 2024-02-22 RX ORDER — PANTOPRAZOLE SODIUM 40 MG/1
40 TABLET, DELAYED RELEASE ORAL
COMMUNITY
Start: 2019-07-15

## 2024-02-22 RX ORDER — MELOXICAM 7.5 MG/1
7.5 TABLET ORAL PRN
COMMUNITY

## 2024-03-06 ENCOUNTER — TRANSCRIBE ORDERS (OUTPATIENT)
Dept: ADMINISTRATIVE | Facility: HOSPITAL | Age: 79
End: 2024-03-06
Payer: MEDICARE

## 2024-03-06 ENCOUNTER — LAB (OUTPATIENT)
Dept: LAB | Facility: HOSPITAL | Age: 79
End: 2024-03-06
Payer: MEDICARE

## 2024-03-06 DIAGNOSIS — E11.9 DIABETES MELLITUS WITHOUT COMPLICATION: Primary | ICD-10-CM

## 2024-03-06 DIAGNOSIS — E11.9 DIABETES MELLITUS WITHOUT COMPLICATION: ICD-10-CM

## 2024-03-06 LAB
ALBUMIN SERPL-MCNC: 4.4 G/DL (ref 3.5–5.2)
ALBUMIN/GLOB SERPL: 1.6 G/DL
ALP SERPL-CCNC: 88 U/L (ref 39–117)
ALT SERPL W P-5'-P-CCNC: 18 U/L (ref 1–33)
ANION GAP SERPL CALCULATED.3IONS-SCNC: 11 MMOL/L (ref 5–15)
AST SERPL-CCNC: 19 U/L (ref 1–32)
BILIRUB SERPL-MCNC: 0.4 MG/DL (ref 0–1.2)
BUN SERPL-MCNC: 13 MG/DL (ref 8–23)
BUN/CREAT SERPL: 16.5 (ref 7–25)
CALCIUM SPEC-SCNC: 9.4 MG/DL (ref 8.6–10.5)
CHLORIDE SERPL-SCNC: 106 MMOL/L (ref 98–107)
CHOLEST SERPL-MCNC: 204 MG/DL (ref 0–200)
CO2 SERPL-SCNC: 24 MMOL/L (ref 22–29)
CREAT SERPL-MCNC: 0.79 MG/DL (ref 0.57–1)
EGFRCR SERPLBLD CKD-EPI 2021: 77.6 ML/MIN/1.73
GLOBULIN UR ELPH-MCNC: 2.8 GM/DL
GLUCOSE SERPL-MCNC: 103 MG/DL (ref 65–99)
HBA1C MFR BLD: 7.8 % (ref 4.8–5.6)
HDLC SERPL-MCNC: 42 MG/DL (ref 40–60)
LDLC SERPL CALC-MCNC: 126 MG/DL (ref 0–100)
LDLC/HDLC SERPL: 2.9 {RATIO}
POTASSIUM SERPL-SCNC: 4.3 MMOL/L (ref 3.5–5.2)
PROT SERPL-MCNC: 7.2 G/DL (ref 6–8.5)
SODIUM SERPL-SCNC: 141 MMOL/L (ref 136–145)
TRIGL SERPL-MCNC: 200 MG/DL (ref 0–150)
VLDLC SERPL-MCNC: 36 MG/DL (ref 5–40)

## 2024-03-06 PROCEDURE — 83036 HEMOGLOBIN GLYCOSYLATED A1C: CPT

## 2024-03-06 PROCEDURE — 80061 LIPID PANEL: CPT

## 2024-03-06 PROCEDURE — 36415 COLL VENOUS BLD VENIPUNCTURE: CPT

## 2024-03-06 PROCEDURE — 80053 COMPREHEN METABOLIC PANEL: CPT

## 2024-03-14 NOTE — PROGRESS NOTES
Office Established Patient Note:     Referring Provider: Dr. Hung Nicole    Chief complaint 7 months out from left mastectomy with intermittent seroma for evaluation and repeat aspiration.    Subjective .     History of present illness:  Pam Clayton is a 78 y.o. female  currently 7 months out from a left mastectomy with a seroma present, she was aspirated earlier this week and she is here for follow-up complaining of fluid, we will evaluate the area and aspirate as needed.  She saw Brisa last week and he has been aspirated 60 cc out I saw 2 weeks ago and aspirated approximately 100 cc out.  She is back in the office now she states that she has been aspirated at least 5 times, she is frustrated with the situation, she states that her  is also has terminal cancer and she is frustrated with this fluid and also the need for reaspiration.        She is currently here for follow-up her  is in the hospital with his terminal cancer, she is here for follow-up of her seroma from her left chest from Dr Dixon's mastectomy.     She is currently 8 weeks out from mastectomy, I am seeing her weekly due to seroma, she is doing much better, the fullness in the left chest is much reduced, and the area was checked and reaspirated today 40 cc 90 cc last week and 180 the day week before.     She is here for weekly evaluation and aspiration of this seroma following Dr Dixon's mastectomy in early August she is having no problems there seems to be some fluid present probably 25 to 30 cc and we will look toward aspiration after aspiration it turned out 35 cc of blood-tinged fluid with full resolution.     She is currently 2 months out from surgery I am following her for a seroma in the left chest last week I saw her and we aspirated 35 cc of blood-tinged fluid     Currently 3 months out from surgery, I have not seen her for the past month, she does have a seroma back, we will aspirate this ultimately yielded 60 cc of  fluid.     Currently 4 months out from surgery with Dr. Ledezma, was completed 1 August, she has a seroma that we are following, it has been a month since I last aspirated this she still has some fluid and has questions about the prominence in her left latissimus area.        Currently 5 months out from left mastectomy, she has a recurrent seroma, I last saw her a month ago and had approximately 60 cc of material out, she is here for follow-up she recently lost her  this morning.  After a long illness.     Currently she is 6 months out from mastectomy, continues to have a recurrent seroma it is asymptomatic.  She has had 16 separate drains of this area she states and she is here for follow-up.  She does have a seroma it is totally asymptomatic and seems to be 50 to 60 cc on examination, no erythema and no particular problems.  She lost her  a month ago and she is slowly improving and returning to normal from this.    Dr. 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  After discussion she desires to have excision of the scar and seroma to hopefully reduce recurrent seroma formation.  She is aware the procedure its risk and benefits and gives her informed consent for surgery.       History  Past Medical History:   Diagnosis Date    Arthritis     Bladder cancer     Bladder cancer    Diabetes mellitus     GERD (gastroesophageal reflux disease)     Hard of hearing     Hx of excision of tumor of brain meninges     Hyperlipidemia     Hypertension     UTI (urinary tract infection)     Vertigo    ,   Past Surgical History:   Procedure Laterality Date    APPENDECTOMY      BRAIN TUMOR EXCISION      CHOLECYSTECTOMY      CYSTOSCOPY BLADDER BIOPSY N/A 7/25/2018    Procedure: CYSTOSCOPY BILATERAL RETROGRADE PYELOGRAM BLADDER BIOPSIES WITH FULGURATION;  Surgeon: Rj Tijerina MD;  Location: [facility] OR;  " Service: Urology    CYSTOSCOPY BLADDER BIOPSY N/A 10/31/2018    Procedure: CYSTOSCOPY BLADDER BIOPSY WITH FULGURATION;  Surgeon: Rj Tijerina MD;  Location:  [facility] OR;  Service: Urology    HYSTERECTOMY      MASTECTOMY W/ SENTINEL NODE BIOPSY Left 8/1/2023    Procedure: BREAST MASTECTOMY WITH MAG SEED GUIDED AND SENTINEL NODE BIOPSY;  Surgeon: Tonia Ledezma MD;  Location:  [facility] OR;  Service: General;  Laterality: Left;    TOTAL KNEE ARTHROPLASTY Bilateral    ,   Family History   Problem Relation Age of Onset    No Known Problems Father     Cancer Mother     Diabetes Mother     Heart disease Mother    ,   Social History     Tobacco Use    Smoking status: Never    Smokeless tobacco: Never   Vaping Use    Vaping status: Never Used   Substance Use Topics    Alcohol use: Not Currently     Comment: \"One drink a year\"    Drug use: No   , (Not in a hospital admission)   and Allergies:  Halothane, Atorvastatin, Other, Cortisone, Lisinopril, Metformin, Mometasone furoate, Sulfa antibiotics, Tramadol, Codeine, Cortisol manager [cholestatin], Dexlansoprazole, Levaquin [levofloxacin], and Phenytoin sodium extended    Current Outpatient Medications:     Cetirizine HCl 10 MG capsule, Take 1 tablet by mouth Daily., Disp: , Rfl:     Coenzyme Q10 10 MG capsule, Take 400 mg by mouth Daily., Disp: , Rfl:     esomeprazole (nexIUM) 40 MG capsule, Take 1 capsule by mouth Every Morning Before Breakfast., Disp: , Rfl:     glucosamine-chondroitin 500-400 MG capsule capsule, Take 1 capsule by mouth 2 (Two) Times a Day., Disp: , Rfl:     HYDROcodone-acetaminophen (NORCO) 7.5-325 MG per tablet, Take 1 tablet by mouth Every 4 (Four) Hours As Needed for Moderate Pain (Pain)., Disp: 20 tablet, Rfl: 0    insulin detemir (LEVEMIR) 100 UNIT/ML injection, Inject 48 Units under the skin into the appropriate area as directed Every Night., Disp: , Rfl:     insulin glargine (LANTUS, SEMGLEE) 100 UNIT/ML injection, Inject 48 Units under the " skin into the appropriate area as directed Daily. Every night, Disp: , Rfl:     insulin lispro protamine-insulin lispro (humaLOG 50-50) (50-50) 100 UNIT/ML suspension injection, Inject 6 Units under the skin into the appropriate area as directed 3 (Three) Times a Day., Disp: , Rfl:     Lactobacillus-Inulin ([facility] [employer]) capsule, Take 1 tablet by mouth Daily., Disp: , Rfl:     metoprolol succinate XL (TOPROL-XL) 100 MG 24 hr tablet, Take 0.5 tablets by mouth Every Night., Disp: , Rfl:     Objective     Vital Signs   There were no vitals taken for this visit.     Physical Exam:  Respirations clear and equal    Cardiovascular exam regular rate and rhythm    Abdomen is soft, flat, nontender, nondistended.    Right breast unremarkable, left chest status post previous excision, she has a seroma in the central aspect of the chest, it has been relatively stable having aspirated this every 6 to 8 weeks she desires to have this area removed to prevent recurrent episodes.    Extremities within normal limits.      Component    Note to Patients    This report may contain a detailed description of human tissue sent by a health care provider to the laboratory for pathologic evaluation. The content of this report is essential for diagnosis and may provide important critical findings. This information may be unfamiliar to patients to review without a medical professional present. It is advised that the patient review this report in the presence of a health care provider who can answer questions and explain the results.   Case Report   Surgical Pathology Report                         Case: [id number]                                   Authorizing Provider:  Tonia Ledezma MD        Collected:           08/01/2023 03:56 PM           Ordering Location:     [facility]  Received:            08/02/2023 08:07 AM           Pathologist:           Clare Suh MD                                                          Specimens:   1) - Breast, Left, left breast                                                                      2) - [place] Lymph Node, SENTINEL NODES WITH LYMPH TISSUE                                Final Diagnosis   1.  Left breast, mastectomy:  A.  Invasive lobular carcinoma, grade 1, identified at prior biopsy site (18 mm).  B.  Associated lobular carcinoma in situ.  C.  Focal atypical lobular hyperplasia identified in random section from upper outer quadrant of breast.  D.  No evidence of Paget's disease of nipple.  E.  Skin and closest inked (posterior) surgical margin are negative for malignancy.  F.  Prior biopsy site changes including fibrosis and fat necrosis.  G.  Columnar cell changes without atypia.  H.  Usual ductal hyperplasia.  I.  Fibrocystic changes of breast with papillary apocrine change.  J.  Medial calcifications identified in mammary vasculature.  K.  Seborrheic keratoses, skin of breast.     2.  Left axillary sentinel lymph nodes:  A. Four of 4 lymph nodes are negative for metastatic carcinoma.  B.  Absence of micrometastases is confirmed utilizing immunohistochemical stains for pankeratin.     AJCC stage: pT1c pN0   Electronically signed by Clare Suh MD on 8/4/2023 at 1634   Synoptic Checklist   INVASIVE CARCINOMA OF THE BREAST: Resection   8th Edition - Protocol posted: 3/22/2023INVASIVE CARCINOMA OF THE BR        Results Review:  Result Review :  Lab Results   Component Value Date    FINALDX  08/01/2023     1.  Left breast, mastectomy:  A.  Invasive lobular carcinoma, grade 1, identified at prior biopsy site (18 mm).  B.  Associated lobular carcinoma in situ.  C.  Focal atypical lobular hyperplasia identified in random section from upper outer quadrant of breast.  D.  No evidence of Paget's disease of nipple.  E.  Skin and closest inked (posterior) surgical margin are negative for malignancy.  F.  Prior biopsy site changes including fibrosis and fat necrosis.  G.  " Columnar cell changes without atypia.  H.  Usual ductal hyperplasia.  I.  Fibrocystic changes of breast with papillary apocrine change.  J.  Medial calcifications identified in mammary vasculature.  K.  Seborrheic keratoses, skin of breast.    2.  Left axillary sentinel lymph nodes:  A. Four of 4 lymph nodes are negative for metastatic carcinoma.  B.  Absence of micrometastases is confirmed utilizing immunohistochemical stains for pankeratin.    AJCC stage: pT1c pN0      GROSSDES  08/01/2023     1. Breast, Left.   Received in a formalin filled container labeled with the patient's name, date of birth, and \"left breast\".  The specimen is received after hours on 8/1 and processed on the morning of 8/2.  The specimen has a cold ischemic time of 35 minutes and a formalin fixation time of 28-1/2 hours.  The specimen consists of a left breast measuring 18.5 cm medial to lateral by 14.7 cm superior to inferior by 3.3 cm anterior to posterior and weighing 671 g.  The attached skin ellipse measures 22.5 x 15.3 cm.  The skin surface is marked by multiple tan-brown, slightly raised lesions mostly confined to the inferior-medial quadrant and ranging from 0.2 cm to 0.8 cm in greatest dimension.  The nipple appears unremarkable.  Sectioning through the lesions shows superficial thickening and are otherwise unremarkable.  The posterior margin is yellow-tan, mostly flattened and intact.  The posterior margin is inked black.  Sectioning reveals a jelly filled biopsy cavity in the central 1:00 aspect of the breast.  The biopsy cavity measures 1.4 cm in length by 0.5 cm in diameter.  The biopsy clip is identified within the gelatinous plug.  The biopsy cavity measures 3.5 cm from the posterior margin.  Surrounding the biopsy cavity is a slightly more stellate yellow to gray mass measuring 1.3 x 1.1 x 1.1 cm.  The soft mass measures 3.1 cm from the posterior margin, 5.5 cm from the superior margin, 8.3 cm from the inferior margin, 9.7 " cm from the lateral margin, 5.7 cm from the medial margin, 4.8 cm from the skin, and 5.5 cm superior-lateral to the nipple.  Sectioning through the remaining breast reveals yellow-pink, soft and lobulated fibroadipose tissue with thin fibrous septae and evidence of fibrocystic change.  1A-perpendicular section of nipple  1B-representative sections of skin lesions  1C-representative section of unremarkable skin  1D-perpendicular section of posterior margin adjacent to mass  1E through 1H-representative sections of biopsy cavity and mass  1I-random upper outer quadrant  1J-random lower outer quadrant  1K-random upper inner quadrant  1L-random lower inner quadrant    2. [place] Lymph Node.   Received in a formalin filled container labeled with the patient's name, date of birth, and designated sentinel lymph node-left axilla.  The specimen consists of 2 fragments of yellow-pink soft tissue aggregating to 4.5 x 3.5 x 1.4 cm.  Dissection reveals multiple lymph node candidates measuring up to 2.9 cm.  The cut surface of the largest lymph node candidate exhibits 30% fatty replacement with green dye uptake.  The cut surface of the remaining lymph node candidates are unremarkable.  2A through 2D-largest lymph node candidate  2E and 2F-bisected lymph node candidate  2G-1 bisected and 1 intact lymph node candidate                Assessment & Plan     78-year-old female with recurrent seroma left chest 6 months out from left mastectomy by Dr. Tonia Dixon.  Plan for excision of the scar and the underlying cyst on 19 March patient is aware the procedure the risk and benefits and gives her informed consent for surgery.    Discussed BMI elevation and need to move toward a reduced BMI for health concerns she appears to understand she is a non smoker and her meds were reviewed.      Julio Preston MD  03/14/24  09:11 CDT

## 2024-03-14 NOTE — H&P (VIEW-ONLY)
Office Established Patient Note:     Referring Provider: Dr. Hung Nicole    Chief complaint 7 months out from left mastectomy with intermittent seroma for evaluation and repeat aspiration.    Subjective .     History of present illness:  Pam Clayton is a 78 y.o. female  currently 7 months out from a left mastectomy with a seroma present, she was aspirated earlier this week and she is here for follow-up complaining of fluid, we will evaluate the area and aspirate as needed.  She saw Brisa last week and he has been aspirated 60 cc out I saw 2 weeks ago and aspirated approximately 100 cc out.  She is back in the office now she states that she has been aspirated at least 5 times, she is frustrated with the situation, she states that her  is also has terminal cancer and she is frustrated with this fluid and also the need for reaspiration.        She is currently here for follow-up her  is in the hospital with his terminal cancer, she is here for follow-up of her seroma from her left chest from Dr Dixon's mastectomy.     She is currently 8 weeks out from mastectomy, I am seeing her weekly due to seroma, she is doing much better, the fullness in the left chest is much reduced, and the area was checked and reaspirated today 40 cc 90 cc last week and 180 the day week before.     She is here for weekly evaluation and aspiration of this seroma following Dr Dixon's mastectomy in early August she is having no problems there seems to be some fluid present probably 25 to 30 cc and we will look toward aspiration after aspiration it turned out 35 cc of blood-tinged fluid with full resolution.     She is currently 2 months out from surgery I am following her for a seroma in the left chest last week I saw her and we aspirated 35 cc of blood-tinged fluid     Currently 3 months out from surgery, I have not seen her for the past month, she does have a seroma back, we will aspirate this ultimately yielded 60 cc of  fluid.     Currently 4 months out from surgery with Dr. Ledezma, was completed 1 August, she has a seroma that we are following, it has been a month since I last aspirated this she still has some fluid and has questions about the prominence in her left latissimus area.        Currently 5 months out from left mastectomy, she has a recurrent seroma, I last saw her a month ago and had approximately 60 cc of material out, she is here for follow-up she recently lost her  this morning.  After a long illness.     Currently she is 6 months out from mastectomy, continues to have a recurrent seroma it is asymptomatic.  She has had 16 separate drains of this area she states and she is here for follow-up.  She does have a seroma it is totally asymptomatic and seems to be 50 to 60 cc on examination, no erythema and no particular problems.  She lost her  a month ago and she is slowly improving and returning to normal from this.    Dr. 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  After discussion she desires to have excision of the scar and seroma to hopefully reduce recurrent seroma formation.  She is aware the procedure its risk and benefits and gives her informed consent for surgery.       History  Past Medical History:   Diagnosis Date    Arthritis     Bladder cancer     Bladder cancer    Diabetes mellitus     GERD (gastroesophageal reflux disease)     Hard of hearing     Hx of excision of tumor of brain meninges     Hyperlipidemia     Hypertension     UTI (urinary tract infection)     Vertigo    ,   Past Surgical History:   Procedure Laterality Date    APPENDECTOMY      BRAIN TUMOR EXCISION      CHOLECYSTECTOMY      CYSTOSCOPY BLADDER BIOPSY N/A 7/25/2018    Procedure: CYSTOSCOPY BILATERAL RETROGRADE PYELOGRAM BLADDER BIOPSIES WITH FULGURATION;  Surgeon: Rj Tijerina MD;  Location: [facility] OR;  " Service: Urology    CYSTOSCOPY BLADDER BIOPSY N/A 10/31/2018    Procedure: CYSTOSCOPY BLADDER BIOPSY WITH FULGURATION;  Surgeon: Rj Tijerina MD;  Location:  [facility] OR;  Service: Urology    HYSTERECTOMY      MASTECTOMY W/ SENTINEL NODE BIOPSY Left 8/1/2023    Procedure: BREAST MASTECTOMY WITH MAG SEED GUIDED AND SENTINEL NODE BIOPSY;  Surgeon: Tonia Ledezma MD;  Location:  [facility] OR;  Service: General;  Laterality: Left;    TOTAL KNEE ARTHROPLASTY Bilateral    ,   Family History   Problem Relation Age of Onset    No Known Problems Father     Cancer Mother     Diabetes Mother     Heart disease Mother    ,   Social History     Tobacco Use    Smoking status: Never    Smokeless tobacco: Never   Vaping Use    Vaping status: Never Used   Substance Use Topics    Alcohol use: Not Currently     Comment: \"One drink a year\"    Drug use: No   , (Not in a hospital admission)   and Allergies:  Halothane, Atorvastatin, Other, Cortisone, Lisinopril, Metformin, Mometasone furoate, Sulfa antibiotics, Tramadol, Codeine, Cortisol manager [cholestatin], Dexlansoprazole, Levaquin [levofloxacin], and Phenytoin sodium extended    Current Outpatient Medications:     Cetirizine HCl 10 MG capsule, Take 1 tablet by mouth Daily., Disp: , Rfl:     Coenzyme Q10 10 MG capsule, Take 400 mg by mouth Daily., Disp: , Rfl:     esomeprazole (nexIUM) 40 MG capsule, Take 1 capsule by mouth Every Morning Before Breakfast., Disp: , Rfl:     glucosamine-chondroitin 500-400 MG capsule capsule, Take 1 capsule by mouth 2 (Two) Times a Day., Disp: , Rfl:     HYDROcodone-acetaminophen (NORCO) 7.5-325 MG per tablet, Take 1 tablet by mouth Every 4 (Four) Hours As Needed for Moderate Pain (Pain)., Disp: 20 tablet, Rfl: 0    insulin detemir (LEVEMIR) 100 UNIT/ML injection, Inject 48 Units under the skin into the appropriate area as directed Every Night., Disp: , Rfl:     insulin glargine (LANTUS, SEMGLEE) 100 UNIT/ML injection, Inject 48 Units under the " skin into the appropriate area as directed Daily. Every night, Disp: , Rfl:     insulin lispro protamine-insulin lispro (humaLOG 50-50) (50-50) 100 UNIT/ML suspension injection, Inject 6 Units under the skin into the appropriate area as directed 3 (Three) Times a Day., Disp: , Rfl:     Lactobacillus-Inulin ([facility] [employer]) capsule, Take 1 tablet by mouth Daily., Disp: , Rfl:     metoprolol succinate XL (TOPROL-XL) 100 MG 24 hr tablet, Take 0.5 tablets by mouth Every Night., Disp: , Rfl:     Objective     Vital Signs   There were no vitals taken for this visit.     Physical Exam:  Respirations clear and equal    Cardiovascular exam regular rate and rhythm    Abdomen is soft, flat, nontender, nondistended.    Right breast unremarkable, left chest status post previous excision, she has a seroma in the central aspect of the chest, it has been relatively stable having aspirated this every 6 to 8 weeks she desires to have this area removed to prevent recurrent episodes.    Extremities within normal limits.      Component    Note to Patients    This report may contain a detailed description of human tissue sent by a health care provider to the laboratory for pathologic evaluation. The content of this report is essential for diagnosis and may provide important critical findings. This information may be unfamiliar to patients to review without a medical professional present. It is advised that the patient review this report in the presence of a health care provider who can answer questions and explain the results.   Case Report   Surgical Pathology Report                         Case: [id number]                                   Authorizing Provider:  Tonia Ledezma MD        Collected:           08/01/2023 03:56 PM           Ordering Location:     [facility] OR  Received:            08/02/2023 08:07 AM           Pathologist:           Clare Suh MD                                                          Specimens:   1) - Breast, Left, left breast                                                                      2) - [facility] Lymph Node, SENTINEL NODES WITH LYMPH TISSUE                                Final Diagnosis   1.  Left breast, mastectomy:  A.  Invasive lobular carcinoma, grade 1, identified at prior biopsy site (18 mm).  B.  Associated lobular carcinoma in situ.  C.  Focal atypical lobular hyperplasia identified in random section from upper outer quadrant of breast.  D.  No evidence of Paget's disease of nipple.  E.  Skin and closest inked (posterior) surgical margin are negative for malignancy.  F.  Prior biopsy site changes including fibrosis and fat necrosis.  G.  Columnar cell changes without atypia.  H.  Usual ductal hyperplasia.  I.  Fibrocystic changes of breast with papillary apocrine change.  J.  Medial calcifications identified in mammary vasculature.  K.  Seborrheic keratoses, skin of breast.     2.  Left axillary sentinel lymph nodes:  A. Four of 4 lymph nodes are negative for metastatic carcinoma.  B.  Absence of micrometastases is confirmed utilizing immunohistochemical stains for pankeratin.     AJCC stage: pT1c pN0   Electronically signed by Clare Suh MD on 8/4/2023 at 1634   Synoptic Checklist   INVASIVE CARCINOMA OF THE BREAST: Resection   8th Edition - Protocol posted: 3/22/2023INVASIVE CARCINOMA OF THE BR        Results Review:  Result Review :  Lab Results   Component Value Date    FINALDX  08/01/2023     1.  Left breast, mastectomy:  A.  Invasive lobular carcinoma, grade 1, identified at prior biopsy site (18 mm).  B.  Associated lobular carcinoma in situ.  C.  Focal atypical lobular hyperplasia identified in random section from upper outer quadrant of breast.  D.  No evidence of Paget's disease of nipple.  E.  Skin and closest inked (posterior) surgical margin are negative for malignancy.  F.  Prior biopsy site changes including fibrosis and fat necrosis.  G.  " Columnar cell changes without atypia.  H.  Usual ductal hyperplasia.  I.  Fibrocystic changes of breast with papillary apocrine change.  J.  Medial calcifications identified in mammary vasculature.  K.  Seborrheic keratoses, skin of breast.    2.  Left axillary sentinel lymph nodes:  A. Four of 4 lymph nodes are negative for metastatic carcinoma.  B.  Absence of micrometastases is confirmed utilizing immunohistochemical stains for pankeratin.    AJCC stage: pT1c pN0      GROSSDES  08/01/2023     1. Breast, Left.   Received in a formalin filled container labeled with the patient's name, date of birth, and \"left breast\".  The specimen is received after hours on 8/1 and processed on the morning of 8/2.  The specimen has a cold ischemic time of 35 minutes and a formalin fixation time of 28-1/2 hours.  The specimen consists of a left breast measuring 18.5 cm medial to lateral by 14.7 cm superior to inferior by 3.3 cm anterior to posterior and weighing 671 g.  The attached skin ellipse measures 22.5 x 15.3 cm.  The skin surface is marked by multiple tan-brown, slightly raised lesions mostly confined to the inferior-medial quadrant and ranging from 0.2 cm to 0.8 cm in greatest dimension.  The nipple appears unremarkable.  Sectioning through the lesions shows superficial thickening and are otherwise unremarkable.  The posterior margin is yellow-tan, mostly flattened and intact.  The posterior margin is inked black.  Sectioning reveals a jelly filled biopsy cavity in the central 1:00 aspect of the breast.  The biopsy cavity measures 1.4 cm in length by 0.5 cm in diameter.  The biopsy clip is identified within the gelatinous plug.  The biopsy cavity measures 3.5 cm from the posterior margin.  Surrounding the biopsy cavity is a slightly more stellate yellow to gray mass measuring 1.3 x 1.1 x 1.1 cm.  The soft mass measures 3.1 cm from the posterior margin, 5.5 cm from the superior margin, 8.3 cm from the inferior margin, 9.7 " cm from the lateral margin, 5.7 cm from the medial margin, 4.8 cm from the skin, and 5.5 cm superior-lateral to the nipple.  Sectioning through the remaining breast reveals yellow-pink, soft and lobulated fibroadipose tissue with thin fibrous septae and evidence of fibrocystic change.  1A-perpendicular section of nipple  1B-representative sections of skin lesions  1C-representative section of unremarkable skin  1D-perpendicular section of posterior margin adjacent to mass  1E through 1H-representative sections of biopsy cavity and mass  1I-random upper outer quadrant  1J-random lower outer quadrant  1K-random upper inner quadrant  1L-random lower inner quadrant    2. [facility] Lymph Node.   Received in a formalin filled container labeled with the patient's name, date of birth, and designated sentinel lymph node-left axilla.  The specimen consists of 2 fragments of yellow-pink soft tissue aggregating to 4.5 x 3.5 x 1.4 cm.  Dissection reveals multiple lymph node candidates measuring up to 2.9 cm.  The cut surface of the largest lymph node candidate exhibits 30% fatty replacement with green dye uptake.  The cut surface of the remaining lymph node candidates are unremarkable.  2A through 2D-largest lymph node candidate  2E and 2F-bisected lymph node candidate  2G-1 bisected and 1 intact lymph node candidate                Assessment & Plan     78-year-old female with recurrent seroma left chest 6 months out from left mastectomy by Dr. Tonia Dixon.  Plan for excision of the scar and the underlying cyst on 19 March patient is aware the procedure the risk and benefits and gives her informed consent for surgery.    Discussed BMI elevation and need to move toward a reduced BMI for health concerns she appears to understand she is a non smoker and her meds were reviewed.      Julio Preston MD  03/14/24  09:11 CDT

## 2024-03-15 ENCOUNTER — OFFICE VISIT (OUTPATIENT)
Dept: SURGERY | Facility: CLINIC | Age: 79
End: 2024-03-15
Payer: MEDICARE

## 2024-03-15 VITALS
WEIGHT: 155 LBS | DIASTOLIC BLOOD PRESSURE: 75 MMHG | OXYGEN SATURATION: 98 % | SYSTOLIC BLOOD PRESSURE: 119 MMHG | BODY MASS INDEX: 24.91 KG/M2 | HEIGHT: 66 IN | HEART RATE: 68 BPM

## 2024-03-15 DIAGNOSIS — D09.0 CIS (CARCINOMA IN SITU OF BLADDER): Primary | ICD-10-CM

## 2024-03-15 PROBLEM — N64.89 SEROMA OF BREAST: Status: ACTIVE | Noted: 2024-03-15

## 2024-03-15 RX ORDER — SODIUM CHLORIDE 9 MG/ML
100 INJECTION, SOLUTION INTRAVENOUS CONTINUOUS
OUTPATIENT
Start: 2024-03-15

## 2024-03-15 RX ORDER — TAMOXIFEN CITRATE 10 MG/1
10 TABLET ORAL DAILY
COMMUNITY

## 2024-03-15 RX ORDER — ONDANSETRON 2 MG/ML
4 INJECTION INTRAMUSCULAR; INTRAVENOUS EVERY 6 HOURS PRN
OUTPATIENT
Start: 2024-03-15

## 2024-03-15 RX ORDER — MELOXICAM 15 MG/1
15 TABLET ORAL AS NEEDED
COMMUNITY

## 2024-03-15 RX ORDER — DEXAMETHASONE 0.5 MG/1
0.5 TABLET ORAL AS NEEDED
COMMUNITY

## 2024-03-18 ENCOUNTER — PRE-ADMISSION TESTING (OUTPATIENT)
Dept: PREADMISSION TESTING | Facility: HOSPITAL | Age: 79
End: 2024-03-18
Payer: MEDICARE

## 2024-03-18 VITALS
BODY MASS INDEX: 24.57 KG/M2 | WEIGHT: 156.53 LBS | DIASTOLIC BLOOD PRESSURE: 72 MMHG | HEIGHT: 67 IN | SYSTOLIC BLOOD PRESSURE: 123 MMHG | RESPIRATION RATE: 16 BRPM | OXYGEN SATURATION: 98 % | HEART RATE: 74 BPM

## 2024-03-18 LAB
ANION GAP SERPL CALCULATED.3IONS-SCNC: 11 MMOL/L (ref 5–15)
BUN SERPL-MCNC: 15 MG/DL (ref 8–23)
BUN/CREAT SERPL: 21.1 (ref 7–25)
CALCIUM SPEC-SCNC: 9.4 MG/DL (ref 8.6–10.5)
CHLORIDE SERPL-SCNC: 99 MMOL/L (ref 98–107)
CO2 SERPL-SCNC: 25 MMOL/L (ref 22–29)
CREAT SERPL-MCNC: 0.71 MG/DL (ref 0.57–1)
DEPRECATED RDW RBC AUTO: 40.5 FL (ref 37–54)
EGFRCR SERPLBLD CKD-EPI 2021: 88.2 ML/MIN/1.73
ERYTHROCYTE [DISTWIDTH] IN BLOOD BY AUTOMATED COUNT: 12.7 % (ref 12.3–15.4)
GLUCOSE SERPL-MCNC: 234 MG/DL (ref 65–99)
HCT VFR BLD AUTO: 40.2 % (ref 34–46.6)
HGB BLD-MCNC: 13 G/DL (ref 12–15.9)
MCH RBC QN AUTO: 28.2 PG (ref 26.6–33)
MCHC RBC AUTO-ENTMCNC: 32.3 G/DL (ref 31.5–35.7)
MCV RBC AUTO: 87.2 FL (ref 79–97)
PLATELET # BLD AUTO: 236 10*3/MM3 (ref 140–450)
PMV BLD AUTO: 11.2 FL (ref 6–12)
POTASSIUM SERPL-SCNC: 4.4 MMOL/L (ref 3.5–5.2)
RBC # BLD AUTO: 4.61 10*6/MM3 (ref 3.77–5.28)
SODIUM SERPL-SCNC: 135 MMOL/L (ref 136–145)
WBC NRBC COR # BLD AUTO: 11.68 10*3/MM3 (ref 3.4–10.8)

## 2024-03-18 PROCEDURE — 85027 COMPLETE CBC AUTOMATED: CPT

## 2024-03-18 PROCEDURE — 36415 COLL VENOUS BLD VENIPUNCTURE: CPT

## 2024-03-18 PROCEDURE — 80048 BASIC METABOLIC PNL TOTAL CA: CPT

## 2024-03-18 NOTE — DISCHARGE INSTRUCTIONS
Preparing for Surgery  Follow these instructions before the procedure:  Several days or weeks before your procedure    Ask your health care provider about:  Changing or stopping your regular medicines. This is especially important if you are taking diabetes medicines or blood thinners.  Taking medicines such as aspirin and non-steroidal anti-inflammatory drugs (NSAIDS) such as ibuprofen that can thin your blood. Do not take these medicines unless your health care provider tells you to take them.  Taking over-the-counter medicines, vitamins, herbs, and supplements.  Contact your surgeon if you:  Develop a fever of more than 100.4°F (38°C) or other feelings of illness during the 48 hours before your surgery.  Have symptoms that get worse.  Have questions or concerns about your surgery.  If you are going home the same day of your surgery you will need to arrange for a responsible adult, age 18 years old or older, to drive you home from the hospital and stay with you for 24 hours. Verification of the  will be made prior to any procedure requiring sedation. You may not go home in a taxi or any form of public transportation by yourself.     Day before your procedure  24 hours before your procedure DO NOT drink alcoholic beverages or smoke.  24 hours before your procedure STOP taking Erectile Dysfunction medication (i.e.,Cialis, Viagra)   You may be asked to shower with a germ-killing soap.  Day of your procedure   You may take the following medication(s) the morning of surgery with a sip of water: NEXIUM      8 hours before your procedure STOP all food, any dairy products, and full liquids. This includes hard candy, chewing gum or mints. This is extremely important to prevent serious complications.   Up to 2 hours before your scheduled arrival time, you may have clear liquids no cream, powder, or pulp of any kind. Safe options are water, black coffee, plain tea, soda, Gatorade/Powerade, clear broth, apple juice.  2  hours before your scheduled arrival time, STOP drinking clear liquids.  You may need to take another shower with a germ-killing soap before you leave home in the morning. Do not use perfumes, colognes, or body lotions.  Wear comfortable loose-fitting clothing.  Remove all jewelry including body piercing and rings, dark colored nail polish, and make up prior to arrival at the hospital. Leave all valuables at home.   Bring your hearing aids if you rely on them.  Do not wear contact lenses. If you wear eyeglasses remember to bring a case to store them in while you are in surgery.  Do not use denture adhesives since you will be asked to remove them during your surgery.    You do not need to bring your home medications into the hospital.   Bring your sleep apnea device with you on the day of your surgery (if this applies to you).  If you wear portable oxygen, bring it with you.   If you are staying overnight, you may bring a bag of items you may need such as slippers, robe and a change of clothes for your discharge. You may want to leave these items in the car until you are ready for them since your family will take your belongings when you leave the pre-operative area.  Arrive at the hospital as scheduled by the office. You will be asked to arrive 2 hours prior to your surgery time in order to prepare for your procedure.  When you arrive at the hospital  Go to the registration desk located at the main entrance of the hospital.  After registration is completed, you will be given a beeper and a sticker sheet. Take the stickers to Outpatient Surgery and place in the tray at the end of the desk to notify the staff that you have arrived and registered.   Return to the lobby to wait. You are not always called back according to the time of arrival but rather the time your doctor will be ready.  When your beeper lights up and vibrates proceed through the double doors, under the stairs, and a member of the Outpatient Surgery  staff will escort you to your preoperative room.   How to Use Chlorhexidine Before Surgery  Chlorhexidine gluconate (CHG) is a germ-killing (antiseptic) solution that is used to clean the skin. It can get rid of the bacteria that normally live on the skin and can keep them away for about 24 hours. To clean your skin with CHG, you may be given:  A CHG solution to use in the shower or as part of a sponge bath.  A prepackaged cloth that contains CHG.  Cleaning your skin with CHG may help lower the risk for infection:  While you are staying in the intensive care unit of the hospital.  If you have a vascular access, such as a central line, to provide short-term or long-term access to your veins.  If you have a catheter to drain urine from your bladder.  If you are on a ventilator. A ventilator is a machine that helps you breathe by moving air in and out of your lungs.  After surgery.  What are the risks?  Risks of using CHG include:  A skin reaction.  Hearing loss, if CHG gets in your ears and you have a perforated eardrum.  Eye injury, if CHG gets in your eyes and is not rinsed out.  The CHG product catching fire.  Make sure that you avoid smoking and flames after applying CHG to your skin.  Do not use CHG:  If you have a chlorhexidine allergy or have previously reacted to chlorhexidine.  On babies younger than 2 months of age.  How to use CHG solution  Use CHG only as told by your health care provider, and follow the instructions on the label.  Use the full amount of CHG as directed. Usually, this is one bottle.  During a shower    Follow these steps when using CHG solution during a shower (unless your health care provider gives you different instructions):  Start the shower.  Use your normal soap and shampoo to wash your face and hair.  Turn off the shower or move out of the shower stream.  Pour the CHG onto a clean washcloth. Do not use any type of brush or rough-edged sponge.  Starting at your neck, lather your body  down to your toes. Make sure you follow these instructions:  If you will be having surgery, pay special attention to the part of your body where you will be having surgery. Scrub this area for at least 1 minute.  Do not use CHG on your head or face. If the solution gets into your ears or eyes, rinse them well with water.  Avoid your genital area.  Avoid any areas of skin that have broken skin, cuts, or scrapes.  Scrub your back and under your arms. Make sure to wash skin folds.  Let the lather sit on your skin for 1-2 minutes or as long as told by your health care provider.  Thoroughly rinse your entire body in the shower. Make sure that all body creases and crevices are rinsed well.  Dry off with a clean towel. Do not put any substances on your body afterward--such as powder, lotion, or perfume--unless you are told to do so by your health care provider. Only use lotions that are recommended by the .  Put on clean clothes or pajamas.  If it is the night before your surgery, sleep in clean sheets.     During a sponge bath  Follow these steps when using CHG solution during a sponge bath (unless your health care provider gives you different instructions):  Use your normal soap and shampoo to wash your face and hair.  Pour the CHG onto a clean washcloth.  Starting at your neck, lather your body down to your toes. Make sure you follow these instructions:  If you will be having surgery, pay special attention to the part of your body where you will be having surgery. Scrub this area for at least 1 minute.  Do not use CHG on your head or face. If the solution gets into your ears or eyes, rinse them well with water.  Avoid your genital area.  Avoid any areas of skin that have broken skin, cuts, or scrapes.  Scrub your back and under your arms. Make sure to wash skin folds.  Let the lather sit on your skin for 1-2 minutes or as long as told by your health care provider.  Using a different clean, wet washcloth,  thoroughly rinse your entire body. Make sure that all body creases and crevices are rinsed well.  Dry off with a clean towel. Do not put any substances on your body afterward--such as powder, lotion, or perfume--unless you are told to do so by your health care provider. Only use lotions that are recommended by the .  Put on clean clothes or pajamas.  If it is the night before your surgery, sleep in clean sheets.  How to use CHG prepackaged cloths  Only use CHG cloths as told by your health care provider, and follow the instructions on the label.  Use the CHG cloth on clean, dry skin.  Do not use the CHG cloth on your head or face unless your health care provider tells you to.  When washing with the CHG cloth:  Avoid your genital area.  Avoid any areas of skin that have broken skin, cuts, or scrapes.  Before surgery    Follow these steps when using a CHG cloth to clean before surgery (unless your health care provider gives you different instructions):  Using the CHG cloth, vigorously scrub the part of your body where you will be having surgery. Scrub using a back-and-forth motion for 3 minutes. The area on your body should be completely wet with CHG when you are done scrubbing.  Do not rinse. Discard the cloth and let the area air-dry. Do not put any substances on the area afterward, such as powder, lotion, or perfume.  Put on clean clothes or pajamas.  If it is the night before your surgery, sleep in clean sheets.     For general bathing  Follow these steps when using CHG cloths for general bathing (unless your health care provider gives you different instructions).  Use a separate CHG cloth for each area of your body. Make sure you wash between any folds of skin and between your fingers and toes. Wash your body in the following order, switching to a new cloth after each step:  The front of your neck, shoulders, and chest.  Both of your arms, under your arms, and your hands.  Your stomach and groin area,  avoiding the genitals.  Your right leg and foot.  Your left leg and foot.  The back of your neck, your back, and your buttocks.  Do not rinse. Discard the cloth and let the area air-dry. Do not put any substances on your body afterward--such as powder, lotion, or perfume--unless you are told to do so by your health care provider. Only use lotions that are recommended by the .  Put on clean clothes or pajamas.  Contact a health care provider if:  Your skin gets irritated after scrubbing.  You have questions about using your solution or cloth.  You swallow any chlorhexidine. Call your local poison control center (1-608.812.5024 in the [place]  Get help right away if:  Your eyes itch badly, or they become very red or swollen.  Your skin itches badly and is red or swollen.  Your hearing changes.  You have trouble seeing.  You have swelling or tingling in your mouth or throat.  You have trouble breathing.  These symptoms may represent a serious problem that is an emergency. Do not wait to see if the symptoms will go away. Get medical help right away. Call your local emergency services (446 in the [place] Do not drive yourself to the hospital.  Summary  Chlorhexidine gluconate (CHG) is a germ-killing (antiseptic) solution that is used to clean the skin. Cleaning your skin with CHG may help to lower your risk for infection.  You may be given CHG to use for bathing. It may be in a bottle or in a prepackaged cloth to use on your skin. Carefully follow your health care provider's instructions and the instructions on the product label.  Do not use CHG if you have a chlorhexidine allergy.  Contact your health care provider if your skin gets irritated after scrubbing.  This information is not intended to replace advice given to you by your health care provider. Make sure you discuss any questions you have with your health care provider.  Document Revised: 04/17/2023 Document Reviewed: 02/28/2022  Elsevier Patient  Education © 2023 [employer]

## 2024-03-19 ENCOUNTER — ANESTHESIA EVENT (OUTPATIENT)
Dept: PERIOP | Facility: HOSPITAL | Age: 79
End: 2024-03-19
Payer: MEDICARE

## 2024-03-19 ENCOUNTER — HOSPITAL ENCOUNTER (OUTPATIENT)
Facility: HOSPITAL | Age: 79
Setting detail: HOSPITAL OUTPATIENT SURGERY
Discharge: HOME | End: 2024-03-19
Attending: SPECIALIST | Admitting: SPECIALIST
Payer: MEDICARE

## 2024-03-19 ENCOUNTER — ANESTHESIA (OUTPATIENT)
Dept: PERIOP | Facility: HOSPITAL | Age: 79
End: 2024-03-19
Payer: MEDICARE

## 2024-03-19 VITALS
OXYGEN SATURATION: 95 % | SYSTOLIC BLOOD PRESSURE: 123 MMHG | HEART RATE: 75 BPM | TEMPERATURE: 97.6 F | DIASTOLIC BLOOD PRESSURE: 65 MMHG | RESPIRATION RATE: 16 BRPM

## 2024-03-19 DIAGNOSIS — N64.89 SEROMA OF BREAST: ICD-10-CM

## 2024-03-19 DIAGNOSIS — D09.0 CIS (CARCINOMA IN SITU OF BLADDER): ICD-10-CM

## 2024-03-19 LAB
GLUCOSE BLDC GLUCOMTR-MCNC: 153 MG/DL (ref 70–130)
GLUCOSE BLDC GLUCOMTR-MCNC: 161 MG/DL (ref 70–130)

## 2024-03-19 PROCEDURE — 21501 I&D DP ABSC/HMTMA SFT TS NCK: CPT | Performed by: SPECIALIST

## 2024-03-19 PROCEDURE — 25010000002 ONDANSETRON PER 1 MG: Performed by: NURSE ANESTHETIST, CERTIFIED REGISTERED

## 2024-03-19 PROCEDURE — 25010000002 DEXAMETHASONE PER 1 MG: Performed by: ANESTHESIOLOGY

## 2024-03-19 PROCEDURE — 25010000002 VANCOMYCIN 1 G RECONSTITUTED SOLUTION 1 EACH VIAL: Performed by: SPECIALIST

## 2024-03-19 PROCEDURE — 25810000003 SODIUM CHLORIDE 0.9 % SOLUTION 250 ML FLEX CONT: Performed by: SPECIALIST

## 2024-03-19 PROCEDURE — 25010000002 FENTANYL CITRATE (PF) 100 MCG/2ML SOLUTION: Performed by: NURSE ANESTHETIST, CERTIFIED REGISTERED

## 2024-03-19 PROCEDURE — 82948 REAGENT STRIP/BLOOD GLUCOSE: CPT

## 2024-03-19 PROCEDURE — 88304 TISSUE EXAM BY PATHOLOGIST: CPT | Performed by: SPECIALIST

## 2024-03-19 PROCEDURE — 25010000002 PROPOFOL 10 MG/ML EMULSION: Performed by: NURSE ANESTHETIST, CERTIFIED REGISTERED

## 2024-03-19 PROCEDURE — 25810000003 LACTATED RINGERS PER 1000 ML: Performed by: SPECIALIST

## 2024-03-19 RX ORDER — ONDANSETRON 2 MG/ML
4 INJECTION INTRAMUSCULAR; INTRAVENOUS ONCE AS NEEDED
Status: DISCONTINUED | OUTPATIENT
Start: 2024-03-19 | End: 2024-03-19 | Stop reason: HOSPADM

## 2024-03-19 RX ORDER — SODIUM CHLORIDE 9 MG/ML
40 INJECTION, SOLUTION INTRAVENOUS AS NEEDED
Status: DISCONTINUED | OUTPATIENT
Start: 2024-03-19 | End: 2024-03-19 | Stop reason: HOSPADM

## 2024-03-19 RX ORDER — HYDROCODONE BITARTRATE AND ACETAMINOPHEN 10; 325 MG/1; MG/1
1 TABLET ORAL EVERY 4 HOURS PRN
Status: DISCONTINUED | OUTPATIENT
Start: 2024-03-19 | End: 2024-03-19 | Stop reason: HOSPADM

## 2024-03-19 RX ORDER — ONDANSETRON HYDROCHLORIDE 8 MG/1
4 TABLET, FILM COATED ORAL EVERY 8 HOURS PRN
Qty: 10 TABLET | Refills: 1 | Status: SHIPPED | OUTPATIENT
Start: 2024-03-19

## 2024-03-19 RX ORDER — ONDANSETRON 2 MG/ML
4 INJECTION INTRAMUSCULAR; INTRAVENOUS EVERY 6 HOURS PRN
Status: DISCONTINUED | OUTPATIENT
Start: 2024-03-19 | End: 2024-03-19 | Stop reason: HOSPADM

## 2024-03-19 RX ORDER — LIDOCAINE HYDROCHLORIDE 20 MG/ML
INJECTION, SOLUTION EPIDURAL; INFILTRATION; INTRACAUDAL; PERINEURAL AS NEEDED
Status: DISCONTINUED | OUTPATIENT
Start: 2024-03-19 | End: 2024-03-19 | Stop reason: SURG

## 2024-03-19 RX ORDER — MAGNESIUM HYDROXIDE 1200 MG/15ML
LIQUID ORAL AS NEEDED
Status: DISCONTINUED | OUTPATIENT
Start: 2024-03-19 | End: 2024-03-19 | Stop reason: HOSPADM

## 2024-03-19 RX ORDER — SODIUM CHLORIDE 0.9 % (FLUSH) 0.9 %
3-10 SYRINGE (ML) INJECTION AS NEEDED
Status: DISCONTINUED | OUTPATIENT
Start: 2024-03-19 | End: 2024-03-19 | Stop reason: HOSPADM

## 2024-03-19 RX ORDER — LABETALOL HYDROCHLORIDE 5 MG/ML
5 INJECTION, SOLUTION INTRAVENOUS
Status: DISCONTINUED | OUTPATIENT
Start: 2024-03-19 | End: 2024-03-19 | Stop reason: HOSPADM

## 2024-03-19 RX ORDER — SODIUM CHLORIDE 0.9 % (FLUSH) 0.9 %
3 SYRINGE (ML) INJECTION EVERY 12 HOURS SCHEDULED
Status: DISCONTINUED | OUTPATIENT
Start: 2024-03-19 | End: 2024-03-19 | Stop reason: HOSPADM

## 2024-03-19 RX ORDER — ONDANSETRON 2 MG/ML
INJECTION INTRAMUSCULAR; INTRAVENOUS AS NEEDED
Status: DISCONTINUED | OUTPATIENT
Start: 2024-03-19 | End: 2024-03-19 | Stop reason: SURG

## 2024-03-19 RX ORDER — NALOXONE HCL 0.4 MG/ML
0.4 VIAL (ML) INJECTION AS NEEDED
Status: DISCONTINUED | OUTPATIENT
Start: 2024-03-19 | End: 2024-03-19 | Stop reason: HOSPADM

## 2024-03-19 RX ORDER — SODIUM CHLORIDE, SODIUM LACTATE, POTASSIUM CHLORIDE, CALCIUM CHLORIDE 600; 310; 30; 20 MG/100ML; MG/100ML; MG/100ML; MG/100ML
1000 INJECTION, SOLUTION INTRAVENOUS CONTINUOUS
Status: DISCONTINUED | OUTPATIENT
Start: 2024-03-19 | End: 2024-03-19 | Stop reason: HOSPADM

## 2024-03-19 RX ORDER — SODIUM CHLORIDE, SODIUM LACTATE, POTASSIUM CHLORIDE, CALCIUM CHLORIDE 600; 310; 30; 20 MG/100ML; MG/100ML; MG/100ML; MG/100ML
100 INJECTION, SOLUTION INTRAVENOUS CONTINUOUS
Status: DISCONTINUED | OUTPATIENT
Start: 2024-03-19 | End: 2024-03-19 | Stop reason: HOSPADM

## 2024-03-19 RX ORDER — FENTANYL CITRATE 50 UG/ML
INJECTION, SOLUTION INTRAMUSCULAR; INTRAVENOUS AS NEEDED
Status: DISCONTINUED | OUTPATIENT
Start: 2024-03-19 | End: 2024-03-19 | Stop reason: SURG

## 2024-03-19 RX ORDER — HYDROCODONE BITARTRATE AND ACETAMINOPHEN 7.5; 325 MG/1; MG/1
1 TABLET ORAL EVERY 4 HOURS PRN
Qty: 20 TABLET | Refills: 0 | Status: SHIPPED | OUTPATIENT
Start: 2024-03-19

## 2024-03-19 RX ORDER — EPHEDRINE SULFATE 50 MG/ML
INJECTION INTRAVENOUS AS NEEDED
Status: DISCONTINUED | OUTPATIENT
Start: 2024-03-19 | End: 2024-03-19 | Stop reason: SURG

## 2024-03-19 RX ORDER — PROPOFOL 10 MG/ML
VIAL (ML) INTRAVENOUS AS NEEDED
Status: DISCONTINUED | OUTPATIENT
Start: 2024-03-19 | End: 2024-03-19 | Stop reason: SURG

## 2024-03-19 RX ORDER — DEXAMETHASONE SODIUM PHOSPHATE 4 MG/ML
4 INJECTION, SOLUTION INTRA-ARTICULAR; INTRALESIONAL; INTRAMUSCULAR; INTRAVENOUS; SOFT TISSUE ONCE AS NEEDED
Status: DISCONTINUED | OUTPATIENT
Start: 2024-03-19 | End: 2024-03-19 | Stop reason: HOSPADM

## 2024-03-19 RX ORDER — FENTANYL CITRATE 50 UG/ML
50 INJECTION, SOLUTION INTRAMUSCULAR; INTRAVENOUS
Status: DISCONTINUED | OUTPATIENT
Start: 2024-03-19 | End: 2024-03-19 | Stop reason: HOSPADM

## 2024-03-19 RX ORDER — DROPERIDOL 2.5 MG/ML
0.62 INJECTION, SOLUTION INTRAMUSCULAR; INTRAVENOUS ONCE AS NEEDED
Status: DISCONTINUED | OUTPATIENT
Start: 2024-03-19 | End: 2024-03-19 | Stop reason: HOSPADM

## 2024-03-19 RX ORDER — SODIUM CHLORIDE 0.9 % (FLUSH) 0.9 %
3 SYRINGE (ML) INJECTION AS NEEDED
Status: DISCONTINUED | OUTPATIENT
Start: 2024-03-19 | End: 2024-03-19 | Stop reason: HOSPADM

## 2024-03-19 RX ORDER — HYDROCODONE BITARTRATE AND ACETAMINOPHEN 5; 325 MG/1; MG/1
1 TABLET ORAL EVERY 4 HOURS PRN
Status: DISCONTINUED | OUTPATIENT
Start: 2024-03-19 | End: 2024-03-19 | Stop reason: HOSPADM

## 2024-03-19 RX ORDER — LIDOCAINE HYDROCHLORIDE 10 MG/ML
0.5 INJECTION, SOLUTION EPIDURAL; INFILTRATION; INTRACAUDAL; PERINEURAL ONCE AS NEEDED
Status: DISCONTINUED | OUTPATIENT
Start: 2024-03-19 | End: 2024-03-19 | Stop reason: HOSPADM

## 2024-03-19 RX ORDER — SODIUM CHLORIDE 9 MG/ML
100 INJECTION, SOLUTION INTRAVENOUS CONTINUOUS
Status: DISCONTINUED | OUTPATIENT
Start: 2024-03-19 | End: 2024-03-19 | Stop reason: HOSPADM

## 2024-03-19 RX ORDER — KETOROLAC TROMETHAMINE 10 MG/1
10 TABLET, FILM COATED ORAL EVERY 6 HOURS PRN
Qty: 10 TABLET | Refills: 1 | Status: SHIPPED | OUTPATIENT
Start: 2024-03-19

## 2024-03-19 RX ORDER — FLUMAZENIL 0.1 MG/ML
0.2 INJECTION INTRAVENOUS AS NEEDED
Status: DISCONTINUED | OUTPATIENT
Start: 2024-03-19 | End: 2024-03-19 | Stop reason: HOSPADM

## 2024-03-19 RX ADMIN — VANCOMYCIN HYDROCHLORIDE 1000 MG: 1 INJECTION, POWDER, LYOPHILIZED, FOR SOLUTION INTRAVENOUS at 09:30

## 2024-03-19 RX ADMIN — EPHEDRINE SULFATE 15 MG: 50 INJECTION INTRAVENOUS at 10:49

## 2024-03-19 RX ADMIN — FENTANYL CITRATE 50 MCG: 50 INJECTION, SOLUTION INTRAMUSCULAR; INTRAVENOUS at 10:39

## 2024-03-19 RX ADMIN — PROPOFOL 150 MG: 10 INJECTION, EMULSION INTRAVENOUS at 10:02

## 2024-03-19 RX ADMIN — VANCOMYCIN HYDROCHLORIDE 1000 MG: 1 INJECTION, POWDER, LYOPHILIZED, FOR SOLUTION INTRAVENOUS at 09:59

## 2024-03-19 RX ADMIN — ONDANSETRON 4 MG: 2 INJECTION INTRAMUSCULAR; INTRAVENOUS at 10:39

## 2024-03-19 RX ADMIN — SODIUM CHLORIDE, POTASSIUM CHLORIDE, SODIUM LACTATE AND CALCIUM CHLORIDE 1000 ML: 600; 310; 30; 20 INJECTION, SOLUTION INTRAVENOUS at 08:18

## 2024-03-19 RX ADMIN — HYDROCODONE BITARTRATE AND ACETAMINOPHEN 1 TABLET: 10; 325 TABLET ORAL at 12:01

## 2024-03-19 RX ADMIN — EPHEDRINE SULFATE 15 MG: 50 INJECTION INTRAVENOUS at 10:12

## 2024-03-19 RX ADMIN — FENTANYL CITRATE 50 MCG: 50 INJECTION, SOLUTION INTRAMUSCULAR; INTRAVENOUS at 10:02

## 2024-03-19 RX ADMIN — LIDOCAINE HYDROCHLORIDE 100 MG: 20 INJECTION, SOLUTION EPIDURAL; INFILTRATION; INTRACAUDAL; PERINEURAL at 10:02

## 2024-03-19 NOTE — ANESTHESIA POSTPROCEDURE EVALUATION
Patient: Pam Clayton    Procedure Summary       Date: 03/19/24 Room / Location:  [facility] OR 04 /  [facility]    Anesthesia Start: 0959 Anesthesia Stop: 1100    Procedure: EXCISION CYST excision of central aspect of the scar, excision of seroma and closure over a drain. PER TALIA LEFT CHEST WALL (Left: Abdomen) Diagnosis:       CIS (carcinoma in situ of bladder)      (CIS (carcinoma in situ of bladder) [D09.0])    Surgeons: Julio Preston MD Provider: Angela Quiroz CRNA    Anesthesia Type: general ASA Status: 3            Anesthesia Type: general    Vitals  Vitals Value Taken Time   /64 03/19/24 1115   Temp 97.6 °F (36.4 °C) 03/19/24 1115   Pulse 75 03/19/24 1118   Resp 11 03/19/24 1115   SpO2 99 % 03/19/24 1118   Vitals shown include unfiled device data.        Post Anesthesia Care and Evaluation    Patient location during evaluation: PACU  Patient participation: complete - patient participated  Level of consciousness: awake and awake and alert  Pain score: 0  Pain management: adequate    Airway patency: patent  Anesthetic complications: No anesthetic complications  PONV Status: none  Cardiovascular status: acceptable  Respiratory status: acceptable  Hydration status: acceptable    Comments: Patient discharged according to acceptable Benny score per RN assessment. See nursing records for further information.     Blood pressure 141/62, pulse 76, temperature 97.6 °F (36.4 °C), temperature source Skin, resp. rate 11, SpO2 99%, not currently breastfeeding.

## 2024-03-19 NOTE — ANESTHESIA PREPROCEDURE EVALUATION
Anesthesia Evaluation     Patient summary reviewed   history of anesthetic complications (flagged in chart, mastectomy used IGEL LMA):  difficult airway  NPO Solid Status: > 8 hours  NPO Liquid Status: > 8 hours           Airway   Mallampati: II  TM distance: >3 FB  Neck ROM: full  Dental      Pulmonary    (-) COPD, asthma, sleep apnea, not a smoker  Cardiovascular   Exercise tolerance: good (4-7 METS)    (+) hypertension  (-) pacemaker, past MI, angina, cardiac stents      Neuro/Psych  (-) seizures, TIA, CVA  GI/Hepatic/Renal/Endo    (+) GERD, diabetes mellitus using insulin  (-) liver disease, no renal disease    Musculoskeletal     Abdominal    Substance History      OB/GYN          Other      history of cancer                    Anesthesia Plan    ASA 3     general     (Patient lists halothane as allergy (mother with severe reaction). States she did well with mastectomy which included sevoflurane. )  intravenous induction     Anesthetic plan, risks, benefits, and alternatives have been provided, discussed and informed consent has been obtained with: patient.    CODE STATUS:

## 2024-03-19 NOTE — OP NOTE
TRUNK LESION/CYST EXCISION  Procedure Note    Pam Clayton  3/19/2024    Pre-op Diagnosis:   CIS (carcinoma in situ of bladder) [D09.0]    Post-op Diagnosis:     Post-Op Diagnosis Codes:     * CIS (carcinoma in situ of bladder) [D09.0]    Procedure/CPT® Codes:      Procedure(s):  Excision of seroma, 10 x 6 x 2 cm in size and closure over a 10 mm flat Esequiel-Teixeira drain.  Surgeon(s):  Julio Preston MD    Anesthesia: General    Staff:   Specimens       ID Source Type Tests Collected By Collected At Frozen?    A Chest, Left Tissue TISSUE PATHOLOGY EXAM   Julio Preston MD 3/19/24 1031 No    Description: LEFT MASTECTOMY  SCAR SEROMA            Estimated Blood Loss: 10 cc     Specimens:                ID Type Source Tests Collected by Time   A : LEFT MASTECTOMY  SCAR SEROMA Tissue Chest, Left TISSUE PATHOLOGY EXAM Julio Preston MD 3/19/2024 1031         Drains:   Open Drain Inferior;Left Chest (Active)       [REMOVED] Closed/Suction Drain 1 Inferior;Lateral;Left Breast 10 Fr. (Removed)       Indications: Pam Clayton is a 78-year-old female who is currently 7 months out from a left mastectomy with a postoperative seroma present.  She has this aspirated monthly the area is approximately 10 x 6 cm in size yielding approximately 60 to 100 cc out monthly.  She is advised of options of continuing this intermittent aspiration versus excision she desires to have the area excised and closed.  She is aware the procedure the risk and benefits and gives her informed consent for surgery.    Findings: Excision of a 10 x 6 x 2 cm seroma from the left anterior chest.    Complications: No complications    Procedure: Patient is placed in supine position surgical suite and after adequate prepping and draping had been complete with alcohol and Betadine, lines of excision were drawn.  A timeout was completed antibiotics were given, and with this completed elliptical excision of the overlying scar and the underlying  seroma was completed the upper medial aspect of the short was incised to make sure we are fully excising all the seroma cavity full excision was completed, and with full excision accomplishedThe wound was irrigated for hemostasis completed a 10 mm flat Esequiel-Teixeira drain was placed,, hemostasis was completed, a 10 mm flat Esequiel-Teixeira drain was placed, the drain was affixed to the skin using a 3-0 nylon suture, wound was closed using simple inverted interrupted sutures of 3-0 Vicryl and a running subcuticular suture of 4-0 Vicryl.  Following this Mastisol, Steri-Strips, 4 x 4 and Tegaderm applied the patient was then extubated and transported to the recovery room in good condition.          Julio Preston MD     Date: 3/19/2024  Time: 10:58 CDT    Part of this note may be an electronic transcription/translation of spoken language to printed text using the Dragon Dictation System.

## 2024-03-19 NOTE — ANESTHESIA PROCEDURE NOTES
Airway  Urgency: elective    Date/Time: 3/19/2024 10:04 AM  Airway not difficult    General Information and Staff    Patient location during procedure: OR  CRNA/CAA: Angela Quiroz CRNA    Indications and Patient Condition  Indications for airway management: airway protection    Preoxygenated: yes  Mask difficulty assessment: 1 - vent by mask    Final Airway Details  Final airway type: supraglottic airway      Successful airway: I-gel  Size 3     Number of attempts at approach: 1    Additional Comments  atraumatic

## 2024-03-20 LAB
CYTO UR: NORMAL
LAB AP CASE REPORT: NORMAL
Lab: NORMAL
PATH REPORT.FINAL DX SPEC: NORMAL
PATH REPORT.GROSS SPEC: NORMAL

## 2024-03-23 ENCOUNTER — OFFICE VISIT (OUTPATIENT)
Age: 79
End: 2024-03-23

## 2024-03-23 ENCOUNTER — APPOINTMENT (OUTPATIENT)
Dept: CT IMAGING | Facility: HOSPITAL | Age: 79
End: 2024-03-23
Payer: MEDICARE

## 2024-03-23 ENCOUNTER — HOSPITAL ENCOUNTER (EMERGENCY)
Facility: HOSPITAL | Age: 79
Discharge: HOME | End: 2024-03-23
Payer: MEDICARE

## 2024-03-23 VITALS
HEIGHT: 67 IN | WEIGHT: 157 LBS | OXYGEN SATURATION: 99 % | RESPIRATION RATE: 16 BRPM | HEART RATE: 70 BPM | TEMPERATURE: 98.3 F | DIASTOLIC BLOOD PRESSURE: 73 MMHG | SYSTOLIC BLOOD PRESSURE: 145 MMHG | BODY MASS INDEX: 24.64 KG/M2

## 2024-03-23 VITALS
TEMPERATURE: 97.2 F | DIASTOLIC BLOOD PRESSURE: 86 MMHG | HEART RATE: 78 BPM | BODY MASS INDEX: 25.02 KG/M2 | SYSTOLIC BLOOD PRESSURE: 122 MMHG | WEIGHT: 155 LBS | RESPIRATION RATE: 18 BRPM | OXYGEN SATURATION: 98 %

## 2024-03-23 DIAGNOSIS — Z48.00 DRESSING CHANGE: Primary | ICD-10-CM

## 2024-03-23 DIAGNOSIS — Z90.10 STATUS POST MASTECTOMY, UNSPECIFIED LATERALITY: Primary | ICD-10-CM

## 2024-03-23 DIAGNOSIS — E27.8 ADRENAL NODULE: ICD-10-CM

## 2024-03-23 LAB
ALBUMIN SERPL-MCNC: 4.2 G/DL (ref 3.5–5.2)
ALBUMIN/GLOB SERPL: 1.4 G/DL
ALP SERPL-CCNC: 96 U/L (ref 39–117)
ALT SERPL W P-5'-P-CCNC: 15 U/L (ref 1–33)
ANION GAP SERPL CALCULATED.3IONS-SCNC: 12 MMOL/L (ref 5–15)
AST SERPL-CCNC: 18 U/L (ref 1–32)
BASOPHILS # BLD AUTO: 0.08 10*3/MM3 (ref 0–0.2)
BASOPHILS NFR BLD AUTO: 0.7 % (ref 0–1.5)
BILIRUB SERPL-MCNC: 0.3 MG/DL (ref 0–1.2)
BUN SERPL-MCNC: 14 MG/DL (ref 8–23)
BUN/CREAT SERPL: 17.7 (ref 7–25)
CALCIUM SPEC-SCNC: 9.5 MG/DL (ref 8.6–10.5)
CHLORIDE SERPL-SCNC: 102 MMOL/L (ref 98–107)
CO2 SERPL-SCNC: 26 MMOL/L (ref 22–29)
CREAT SERPL-MCNC: 0.79 MG/DL (ref 0.57–1)
D-LACTATE SERPL-SCNC: 1.3 MMOL/L (ref 0.5–2)
DEPRECATED RDW RBC AUTO: 40.8 FL (ref 37–54)
EGFRCR SERPLBLD CKD-EPI 2021: 77.6 ML/MIN/1.73
EOSINOPHIL # BLD AUTO: 0.55 10*3/MM3 (ref 0–0.4)
EOSINOPHIL NFR BLD AUTO: 4.7 % (ref 0.3–6.2)
ERYTHROCYTE [DISTWIDTH] IN BLOOD BY AUTOMATED COUNT: 13 % (ref 12.3–15.4)
GLOBULIN UR ELPH-MCNC: 2.9 GM/DL
GLUCOSE SERPL-MCNC: 157 MG/DL (ref 65–99)
HCT VFR BLD AUTO: 39.3 % (ref 34–46.6)
HGB BLD-MCNC: 13 G/DL (ref 12–15.9)
IMM GRANULOCYTES # BLD AUTO: 0.04 10*3/MM3 (ref 0–0.05)
IMM GRANULOCYTES NFR BLD AUTO: 0.3 % (ref 0–0.5)
LYMPHOCYTES # BLD AUTO: 3.8 10*3/MM3 (ref 0.7–3.1)
LYMPHOCYTES NFR BLD AUTO: 32.5 % (ref 19.6–45.3)
MCH RBC QN AUTO: 28.7 PG (ref 26.6–33)
MCHC RBC AUTO-ENTMCNC: 33.1 G/DL (ref 31.5–35.7)
MCV RBC AUTO: 86.8 FL (ref 79–97)
MONOCYTES # BLD AUTO: 0.51 10*3/MM3 (ref 0.1–0.9)
MONOCYTES NFR BLD AUTO: 4.4 % (ref 5–12)
NEUTROPHILS NFR BLD AUTO: 57.4 % (ref 42.7–76)
NEUTROPHILS NFR BLD AUTO: 6.73 10*3/MM3 (ref 1.7–7)
NRBC BLD AUTO-RTO: 0 /100 WBC (ref 0–0.2)
PLATELET # BLD AUTO: 260 10*3/MM3 (ref 140–450)
PMV BLD AUTO: 11.2 FL (ref 6–12)
POTASSIUM SERPL-SCNC: 4.2 MMOL/L (ref 3.5–5.2)
PROCALCITONIN SERPL-MCNC: 0.03 NG/ML (ref 0–0.25)
PROT SERPL-MCNC: 7.1 G/DL (ref 6–8.5)
RBC # BLD AUTO: 4.53 10*6/MM3 (ref 3.77–5.28)
SODIUM SERPL-SCNC: 140 MMOL/L (ref 136–145)
WBC NRBC COR # BLD AUTO: 11.71 10*3/MM3 (ref 3.4–10.8)

## 2024-03-23 PROCEDURE — 83605 ASSAY OF LACTIC ACID: CPT | Performed by: PHYSICIAN ASSISTANT

## 2024-03-23 PROCEDURE — 84145 PROCALCITONIN (PCT): CPT | Performed by: PHYSICIAN ASSISTANT

## 2024-03-23 PROCEDURE — 80053 COMPREHEN METABOLIC PANEL: CPT | Performed by: PHYSICIAN ASSISTANT

## 2024-03-23 PROCEDURE — 85025 COMPLETE CBC W/AUTO DIFF WBC: CPT | Performed by: PHYSICIAN ASSISTANT

## 2024-03-23 PROCEDURE — 71260 CT THORAX DX C+: CPT

## 2024-03-23 PROCEDURE — 99285 EMERGENCY DEPT VISIT HI MDM: CPT

## 2024-03-23 PROCEDURE — 25510000001 IOPAMIDOL 61 % SOLUTION: Performed by: PHYSICIAN ASSISTANT

## 2024-03-23 RX ORDER — SODIUM CHLORIDE 0.9 % (FLUSH) 0.9 %
10 SYRINGE (ML) INJECTION AS NEEDED
Status: DISCONTINUED | OUTPATIENT
Start: 2024-03-23 | End: 2024-03-23 | Stop reason: HOSPADM

## 2024-03-23 RX ADMIN — IOPAMIDOL 100 ML: 612 INJECTION, SOLUTION INTRAVENOUS at 17:20

## 2024-03-23 ASSESSMENT — ENCOUNTER SYMPTOMS
SHORTNESS OF BREATH: 0
COUGH: 0
NAUSEA: 0
ABDOMINAL PAIN: 0
CHEST TIGHTNESS: 0
ABDOMINAL DISTENTION: 0
CONSTIPATION: 0
TROUBLE SWALLOWING: 0
SORE THROAT: 0
APNEA: 0
COLOR CHANGE: 0
SINUS PAIN: 0
VOMITING: 0
WHEEZING: 0
EYE DISCHARGE: 0
STRIDOR: 0
FACIAL SWELLING: 0
EYE REDNESS: 0
SINUS PRESSURE: 0
CHOKING: 0

## 2024-03-23 NOTE — DISCHARGE INSTRUCTIONS
Please continue to follow-up with your surgeon as previously scheduled on 3/27/2024.  Please continue to monitor your dressing to assure it stays dry and should it become saturated again call your surgeon.  Please make sure that you are using the GLADIS drain as instructed.  Should you develop any new or worsening symptoms please return to the ER for further evaluation.

## 2024-03-23 NOTE — ED PROVIDER NOTES
Subjective   History of Present Illness    Patient is a 78-year-old female presenting to ED with GLADIS drain complication status post vasectomy.  PMH significant for history of bladder cancer, history of breast cancer, insulin-dependent diabetes, GERD, hypertension, arthritis, history excision of brain meningeal tumor, history mastectomy with sentinel node biopsy, hysterectomy, cholecystectomy, appendectomy, excisional cyst removal left chest wall.  Patient states on 3/19/2024 she had a cyst removal and GLADIS drain placed to her left chest wall for which the first couple days she was having appropriate drainage and able to empty the drain without difficulty.  Patient states that yesterday she noticed no output in the drain for which she was excited however upon waking up this morning there was which she believed to be blood throughout the tubing but not in the bulb.  Patient states all day there was no movement of this despite her trying to open and close the bulb multiple times.  Patient states that she was then out doing errands when she started to notice wetness on her shirt, lifted up, and noticed that the GLADIS drain was leaking around onto her dressing.  Patient tried calling the on-call surgery line and was advised to change the dressing at home however she states she lives home alone and did not have the supplies to do this for which she went to urgent care and was advised to come to the ER.  Patient denies any new pain or skin changes to the area, fevers, chills, diaphoresis, nausea, vomiting, difficulties breathing.  Patient states she has not yet had her postoperative outpatient follow-up and is scheduled to do this initially on 3/27/2024.    Records reviewed show patient was seen by general surgery on 3/19/2024 for carcinoma in situ of the bladder, seroma of the breast at which time she had an excision cyst of central aspect of scar and seroma and closure over a drain on the left chest wall.    Patient was then  seen at urgent care earlier today for status postvasectomy.  Patient was advised to go to the ER at that time for further evaluation.    Patient is scheduled for her postoperative follow-up on 3/27/2024    Review of Systems   Constitutional: Negative.  Negative for chills, diaphoresis and fever.   HENT: Negative.     Eyes: Negative.    Respiratory: Negative.     Cardiovascular: Negative.  Negative for chest pain.   Gastrointestinal: Negative.  Negative for nausea and vomiting.   Genitourinary: Negative.    Musculoskeletal: Negative.    Skin:  Positive for wound (left chest wall surgical incision).   Neurological: Negative.    Psychiatric/Behavioral: Negative.     All other systems reviewed and are negative.      Past Medical History:   Diagnosis Date    Arthritis     Bladder cancer     Bladder cancer    Breast cancer     Diabetes mellitus     GERD (gastroesophageal reflux disease)     Hard of hearing     Hx of excision of tumor of brain meninges     Hyperlipidemia     Hypertension     UTI (urinary tract infection)     Vertigo        Allergies   Allergen Reactions    Halothane Anaphylaxis and Unknown (See Comments)     STATES MOTHER HAD ANAPHYLAXIS REACTION TO THIS    Atorvastatin Myalgia    Other Other (See Comments)     THE PATIENT STATES THAT STEROIDS MAKE HER SHAKY, SHE STATES THAT CORTISONE CAUSED HOSPITALIZATION     Cortisone Other (See Comments)    Lisinopril Unknown (See Comments)     DOES NOT REMEMBER    Metformin Diarrhea    Mometasone Furoate Unknown (See Comments)     DOES NOT REMEMBER    Sulfa Antibiotics Unknown (See Comments)     PT STATES SHE DOESN'T REMEMBER WHAT THIS MED DID       Tramadol Delirium    Codeine Anxiety     STATES SHE GETS A HEADACHE      Cortisol Manager [Cholestatin] Anxiety    Dexlansoprazole Diarrhea    Levaquin [Levofloxacin] Confusion     made her sick and loopy    Phenytoin Sodium Extended Itching       Past Surgical History:   Procedure Laterality Date    APPENDECTOMY       "BRAIN TUMOR EXCISION      CHOLECYSTECTOMY      CYSTOSCOPY BLADDER BIOPSY N/A 7/25/2018    Procedure: CYSTOSCOPY BILATERAL RETROGRADE PYELOGRAM BLADDER BIOPSIES WITH FULGURATION;  Surgeon: Rj Tijerina MD;  Location:  [facility] OR;  Service: Urology    CYSTOSCOPY BLADDER BIOPSY N/A 10/31/2018    Procedure: CYSTOSCOPY BLADDER BIOPSY WITH FULGURATION;  Surgeon: Rj Tijerina MD;  Location:  [facility];  Service: Urology    HYSTERECTOMY      MASTECTOMY W/ SENTINEL NODE BIOPSY Left 8/1/2023    Procedure: BREAST MASTECTOMY WITH MAG SEED GUIDED AND SENTINEL NODE BIOPSY;  Surgeon: Tonia Ledezma MD;  Location:  [facility];  Service: General;  Laterality: Left;    TOTAL KNEE ARTHROPLASTY Bilateral     TRUNK LESION/CYST EXCISION Left 3/19/2024    Procedure: EXCISION CYST excision of central aspect of the scar, excision of seroma and closure over a drain. PER TALIA LEFT CHEST WALL;  Surgeon: Julio Preston MD;  Location:  [facility] OR;  Service: General;  Laterality: Left;       Family History   Problem Relation Age of Onset    No Known Problems Father     Cancer Mother     Diabetes Mother     Heart disease Mother        Social History     Socioeconomic History    Marital status:    Tobacco Use    Smoking status: Never    Smokeless tobacco: Never   Vaping Use    Vaping status: Never Used   Substance and Sexual Activity    Alcohol use: Not Currently     Comment: \"One drink a year\"    Drug use: No    Sexual activity: Defer     Partners: Male           Objective   Physical Exam  Vitals and nursing note reviewed. Exam conducted with a chaperone present (Chaperone present for entire examination, Cara THOMAS).   Constitutional:       General: She is not in acute distress.     Appearance: Normal appearance. She is not ill-appearing, toxic-appearing or diaphoretic.   HENT:      Head: Normocephalic.      Mouth/Throat:      Mouth: Mucous membranes are moist.      Pharynx: Oropharynx is clear.   Eyes:      " Conjunctiva/sclera: Conjunctivae normal.      Pupils: Pupils are equal, round, and reactive to light.   Cardiovascular:      Rate and Rhythm: Normal rate and regular rhythm.   Pulmonary:      Effort: Pulmonary effort is normal.      Breath sounds: Normal breath sounds.   Chest:          Comments: Evidence of previous meniscectomy on the left side with knee surgical incision on the lateral aspect of this incision.  Steri-Strips overlying are covered with drainage from GLADIS tube draining gauze however there is no wound dehiscence, no bleeding, no active discharge from this area.  No tenderness or swelling overlying.    Inferior to this there is an insertion of a GLADIS tube drain with skin well-appearing with no erythema, no swelling, no streaking erythema, no tenderness, no fluctuance or induration.  GLADIS tubing completely filled with blood like products with very scant amount of straw-colored fluid within the bulb.   Abdominal:      General: Bowel sounds are normal.      Palpations: Abdomen is soft.   Musculoskeletal:         General: Normal range of motion.      Cervical back: Normal range of motion and neck supple.   Skin:     General: Skin is warm and dry.      Findings: Wound (as described in chest wall section) present. No erythema.   Neurological:      Mental Status: She is alert and oriented to person, place, and time.      Gait: Gait normal.   Psychiatric:         Mood and Affect: Mood normal.         Behavior: Behavior normal.         Procedures           ED Course                                             Medical Decision Making  Problems Addressed:  Dressing change: complicated acute illness or injury    Amount and/or Complexity of Data Reviewed  External Data Reviewed: labs, radiology and notes.  Labs: ordered. Decision-making details documented in ED Course.  Radiology: ordered. Decision-making details documented in ED Course.    Risk  Prescription drug management.      Patient is a 78-year-old female  presenting to ED with GLADIS drain complication status post vasectomy.  PMH significant for history of bladder cancer, history of breast cancer, insulin-dependent diabetes, GERD, hypertension, arthritis, history excision of brain meningeal tumor, history mastectomy with sentinel node biopsy, hysterectomy, cholecystectomy, appendectomy, excisional cyst removal left chest wall.  Upon initial evaluation patient resting comfortably in bed in no acute distress with stable vital signs, nontoxic-appearing, non-ill-appearing.  Examination revealed evidence of previous meniscectomy on the left side with knee surgical incision on the lateral aspect of this incision.  Steri-Strips overlying are covered with drainage from GLADIS tube draining gauze however there is no wound dehiscence, no bleeding, no active discharge from this area.  No tenderness or swelling overlying. Inferior to this there is an insertion of a GLADIS tube drain with skin well-appearing with no erythema, no swelling, no streaking erythema, no tenderness, no fluctuance or induration.  GLADIS tubing completely filled with blood like products with very scant amount of straw-colored fluid within the bulb.  Discussed with patient concern for postoperative seroma and need for imaging to assess.  Advised need to leave GLADIS tube drain in place at this time until further recommendations by the surgeon.  Patient is amenable to labs and imaging with no further questions, concerns, or needs at this time.    Differential diagnosis: Postoperative seroma, postoperative complication, cellulitis, GLADIS tube drain malfunction, abscess, other    Workup revealed leukocytosis 11.71 however low concern for systemic or ischemic process with normal lactic acid as well as further low concern for systemic bacterial process with normal procalcitonin.  CMP with hyperglycemia of 157 and otherwise lab work unremarkable to include stable H&H, normal platelets, no further CBC abnormalities.  No further  electrolyte disturbances, normal renal function, normal hepatic function, normal anion gap. Chest ED with IV contrast showed: Postoperative change of left mastectomy, GLADIS drain in place, no significant fluid collection to suggest seroma or abscess, lungs are clear other than mild posterior dependent atelectasis, increase size of indeterminate 2.7 cm right adrenal gland nodule.  Patient with no further drainage through GLADIS drain during examination.  Dressing was changed.  Patient declined need for any medications for pain or discomfort.  Patient states that she is aware of the 2.7 cm right adrenal nodule and is followed up outpatient with a CT performed last summer.  Discussed with patient need for continued follow-up with her surgeon as previously scheduled on 3/27/2024 for further monitoring of the drain, discussion of removal.  Advised that should she continue to have leakage need to contact the surgeon and change dressings as educated here today.  Patient with lungs clear to auscultation bilaterally, hemodynamically stable, continues to be in no acute distress.  Patient is very appreciative with no further questions, concerns, needs at this time and is stable for discharge.    Final diagnoses:   Dressing change   Adrenal nodule       ED Disposition  ED Disposition       ED Disposition   Discharge    Condition   Stable    Comment   --               Hung Nicole MD  [address]  [address]  [facility] [address]  [phone]    Schedule an appointment as soon as possible for a visit in 2 days      [facility] EMERGENCY DEPARTMENT  [address] [facility] [id number] [phone]    As needed    Julio Preston MD  [address] [facility]   [address]  [facility] [address]  [phone]      Follow-up as previously scheduled on 3/27/2024         Medication List      No changes were made to your prescriptions during this visit.            Montrell Barillas PA-C  03/23/24 [id number]

## 2024-03-23 NOTE — PATIENT INSTRUCTIONS
Patient was advised to report to [facility] ER for higher level of care, as this is where her surgery was performed.

## 2024-03-26 ENCOUNTER — OFFICE VISIT (OUTPATIENT)
Dept: SURGERY | Facility: CLINIC | Age: 79
End: 2024-03-26
Payer: MEDICARE

## 2024-03-26 VITALS
WEIGHT: 157 LBS | HEIGHT: 67 IN | SYSTOLIC BLOOD PRESSURE: 147 MMHG | DIASTOLIC BLOOD PRESSURE: 82 MMHG | BODY MASS INDEX: 24.64 KG/M2

## 2024-03-26 DIAGNOSIS — C50.912 MALIGNANT NEOPLASM OF LEFT FEMALE BREAST, UNSPECIFIED ESTROGEN RECEPTOR STATUS, UNSPECIFIED SITE OF BREAST: ICD-10-CM

## 2024-03-26 DIAGNOSIS — N64.89 SEROMA OF BREAST: Primary | ICD-10-CM

## 2024-03-26 PROCEDURE — 99024 POSTOP FOLLOW-UP VISIT: CPT | Performed by: SPECIALIST

## 2024-03-26 PROCEDURE — 3079F DIAST BP 80-89 MM HG: CPT | Performed by: SPECIALIST

## 2024-03-26 PROCEDURE — 1159F MED LIST DOCD IN RCRD: CPT | Performed by: SPECIALIST

## 2024-03-26 PROCEDURE — 3077F SYST BP >= 140 MM HG: CPT | Performed by: SPECIALIST

## 2024-03-26 PROCEDURE — 1160F RVW MEDS BY RX/DR IN RCRD: CPT | Performed by: SPECIALIST

## 2024-03-26 RX ORDER — PANTOPRAZOLE SODIUM 40 MG/1
1 TABLET, DELAYED RELEASE ORAL DAILY
COMMUNITY

## 2024-03-26 NOTE — PROGRESS NOTES
"Office Established Patient Note:     Referring Provider: Dr. Hung Nicole    Chief complaint follow-up excision of a seroma 3/15/2024    Subjective .     History of present illness:  Pam Clayton is a 78 y.o. female status post previous left mastectomy by Dr. Tonia Dixon in August 2023.  She has had intermittent seroma was drained 1 week prior had excision of the seroma cavity and capsule removed, and she is here for follow-up 1 week out from surgery.  She was seen in the emergency room over the weekend for redressing of the wound..      Chest CT was completed for unknown reason.      IMPRESSION:  1.  Postoperative change of left mastectomy. A GLADIS drain is in place. No  significant fluid collection to suggest a large seroma or abscess.  2.  Lungs are clear other than for mild posterior dependent atelectasis.  3.  Increased size of indeterminate 2.7 cm right adrenal gland nodule.  If this has not previously been further worked up, outpatient MRI  abdomen is recommended.     This report was signed and finalized on 3/23/2024 5:34 PM by Dr. Kartik Bearden MD.         inal Diagnosis   Left mastectomy scar seroma, excision:  Dense fibrous connective tissue with scattered inflammation and vascular congestion.   Electronically signed by Ramesh Ibanez MD on 3/20/2024 at 1031   Gross Description    1. Chest, Left.   Received in a formalin filled container labeled with the patient's name, date of birth, and \"left mastectomy scar seroma\".  The specimen consists of an open sac structure measuring 6.4 x 3.8 x 2.1 cm.  The external surface is pink-gray with fibrofatty adhesions and a skin ellipse measuring 5.5 x 1.0 cm.  A partially healed scar appears to run 2.5 cm in length.  The sac lining is pink-blue, smooth and glistening.  Sectioning reveals a dense gray-white wall averaging 0.3 cm in thickness with no focal areas of thickening identified.  A representative section is submitted in block 1A.      Microscopic " Description       She has done well from her excision, she went to the emergency room on Saturday due to a sore dressing and had 7 chest discomfort she had a CAT scan completed that was unremarkable there is no further fluid, the dressing that they placed was not really placed well she had continued drainage, today the drain is working fine, the clot was milked out, she wants the drain out wanted to keep the drain in until Friday she is crying and very adamant that comes out we will split the difference I will see her back on Wednesday, in 1 day, we will check the output, and remove if the output is low.    History  Past Medical History:   Diagnosis Date    Arthritis     Bladder cancer     Bladder cancer    Breast cancer     Diabetes mellitus     GERD (gastroesophageal reflux disease)     Hard of hearing     Hx of excision of tumor of brain meninges     Hyperlipidemia     Hypertension     UTI (urinary tract infection)     Vertigo    ,   Past Surgical History:   Procedure Laterality Date    APPENDECTOMY      BRAIN TUMOR EXCISION      CHOLECYSTECTOMY      CYSTOSCOPY BLADDER BIOPSY N/A 7/25/2018    Procedure: CYSTOSCOPY BILATERAL RETROGRADE PYELOGRAM BLADDER BIOPSIES WITH FULGURATION;  Surgeon: Rj Tijerina MD;  Location:  [facility] OR;  Service: Urology    CYSTOSCOPY BLADDER BIOPSY N/A 10/31/2018    Procedure: CYSTOSCOPY BLADDER BIOPSY WITH FULGURATION;  Surgeon: Rj Tijerina MD;  Location:  [facility] OR;  Service: Urology    HYSTERECTOMY      MASTECTOMY W/ SENTINEL NODE BIOPSY Left 8/1/2023    Procedure: BREAST MASTECTOMY WITH MAG SEED GUIDED AND SENTINEL NODE BIOPSY;  Surgeon: Tonia Ledezma MD;  Location:  [facility] OR;  Service: General;  Laterality: Left;    TOTAL KNEE ARTHROPLASTY Bilateral     TRUNK LESION/CYST EXCISION Left 3/19/2024    Procedure: EXCISION CYST excision of central aspect of the scar, excision of seroma and closure over a drain. VISHAL MELGAR LEFT CHEST WALL;  Surgeon: Julio Preston MD;   "Location: [facility] OR;  Service: General;  Laterality: Left;   ,   Family History   Problem Relation Age of Onset    No Known Problems Father     Cancer Mother     Diabetes Mother     Heart disease Mother    ,   Social History     Tobacco Use    Smoking status: Never    Smokeless tobacco: Never   Vaping Use    Vaping status: Never Used   Substance Use Topics    Alcohol use: Not Currently     Comment: \"One drink a year\"    Drug use: No   , (Not in a hospital admission)   and Allergies:  Halothane, Atorvastatin, Other, Cortisone, Lisinopril, Metformin, Mometasone furoate, Sulfa antibiotics, Tramadol, Codeine, Cortisol manager [cholestatin], Dexlansoprazole, Levaquin [levofloxacin], and Phenytoin sodium extended    Current Outpatient Medications:     Cetirizine HCl 10 MG capsule, Take 1 tablet by mouth Daily., Disp: , Rfl:     Coenzyme Q10 10 MG capsule, Take 400 mg by mouth Daily., Disp: , Rfl:     dexAMETHasone (DECADRON) 0.5 MG tablet, Take 1 tablet by mouth As Needed., Disp: , Rfl:     esomeprazole (nexIUM) 40 MG capsule, Take 1 capsule by mouth Every Morning Before Breakfast., Disp: , Rfl:     glucosamine-chondroitin 500-400 MG capsule capsule, Take 1 capsule by mouth 2 (Two) Times a Day., Disp: , Rfl:     HYDROcodone-acetaminophen (NORCO) 7.5-325 MG per tablet, Take 1 tablet by mouth Every 4 (Four) Hours As Needed for Moderate Pain (Pain). (Patient not taking: Reported on 3/15/2024), Disp: 20 tablet, Rfl: 0    HYDROcodone-acetaminophen (NORCO) 7.5-325 MG per tablet, Take 1 tablet by mouth Every 4 (Four) Hours As Needed for Moderate Pain for up to 20 doses., Disp: 20 tablet, Rfl: 0    insulin detemir (LEVEMIR) 100 UNIT/ML injection, Inject 48 Units under the skin into the appropriate area as directed Every Night. (Patient not taking: Reported on 3/15/2024), Disp: , Rfl:     insulin glargine (LANTUS, SEMGLEE) 100 UNIT/ML injection, Inject 26 Units under the skin into the appropriate area as directed Daily. Every " "night, Disp: , Rfl:     insulin lispro protamine-insulin lispro (humaLOG 50-50) (50-50) 100 UNIT/ML suspension injection, Inject 6 Units under the skin into the appropriate area as directed 3 (Three) Times a Day., Disp: , Rfl:     ketorolac (TORADOL) 10 MG tablet, Take 1 tablet by mouth Every 6 (Six) Hours As Needed for Moderate Pain for up to 10 doses., Disp: 10 tablet, Rfl: 1    Lactobacillus-Inulin ([facility]) capsule, Take 1 tablet by mouth Daily., Disp: , Rfl:     meloxicam (MOBIC) 15 MG tablet, Take 1 tablet by mouth As Needed., Disp: , Rfl:     methylcellulose (Citrucel) oral powder, Take 2 Applications by mouth Daily for 30 doses., Disp: 60 g, Rfl: 6    metoprolol succinate XL (TOPROL-XL) 100 MG 24 hr tablet, Take 0.5 tablets by mouth Every Night., Disp: , Rfl:     ondansetron (Zofran) 8 MG tablet, Take 0.5 tablets by mouth Every 8 (Eight) Hours As Needed for Nausea or Vomiting for up to 10 doses., Disp: 10 tablet, Rfl: 1    tamoxifen (NOLVADEX) 10 MG tablet, Take 1 tablet by mouth Daily., Disp: , Rfl:     Objective     Vital Signs   There were no vitals taken for this visit.     Physical Exam:  Respirations clear and equal  Wound in the left anterior chest clean and dry, no discharge no erythema no sign of any infection.    Pathology is noted below, dense fibrous connective tissue with a seroma scar noted,    The area was cleaned, the drain was assured to be open, and redressed.      Results Review:  Result Review :  Lab Results   Component Value Date    FINALDX  03/19/2024     Left mastectomy scar seroma, excision:  Dense fibrous connective tissue with scattered inflammation and vascular congestion.      GROSSDES  03/19/2024     1. Chest, Left.   Received in a formalin filled container labeled with the patient's name, date of birth, and \"left mastectomy scar seroma\".  The specimen consists of an open sac structure measuring 6.4 x 3.8 x 2.1 cm.  The external surface is pink-gray with " fibrofatty adhesions and a skin ellipse measuring 5.5 x 1.0 cm.  A partially healed scar appears to run 2.5 cm in length.  The sac lining is pink-blue, smooth and glistening.  Sectioning reveals a dense gray-white wall averaging 0.3 cm in thickness with no focal areas of thickening identified.  A representative section is submitted in block 1A.           BMI is within normal parameters. No other follow-up for BMI required.    Assessment & Plan     Follow-up with me in 24 hours for repeat evaluation and removal of the drain if possible.    Discussed BMI elevation and need to move toward a reduced BMI for health concerns she appears to understand she is a non smoker and her meds were reviewed.      Julio Preston MD  03/26/24  09:15 CDT

## 2024-03-26 NOTE — PATIENT INSTRUCTIONS
Nice to see you today MsDarren Forrest I will see you back at 845 tomorrow and hopefully we can get the drain out

## 2024-03-27 ENCOUNTER — OFFICE VISIT (OUTPATIENT)
Dept: SURGERY | Facility: CLINIC | Age: 79
End: 2024-03-27
Payer: MEDICARE

## 2024-03-27 VITALS
OXYGEN SATURATION: 96 % | BODY MASS INDEX: 24.64 KG/M2 | HEIGHT: 67 IN | WEIGHT: 157 LBS | HEART RATE: 78 BPM | SYSTOLIC BLOOD PRESSURE: 123 MMHG | DIASTOLIC BLOOD PRESSURE: 60 MMHG

## 2024-03-27 DIAGNOSIS — C50.912 MALIGNANT NEOPLASM OF LEFT FEMALE BREAST, UNSPECIFIED ESTROGEN RECEPTOR STATUS, UNSPECIFIED SITE OF BREAST: Primary | ICD-10-CM

## 2024-03-27 PROCEDURE — 3074F SYST BP LT 130 MM HG: CPT | Performed by: SPECIALIST

## 2024-03-27 PROCEDURE — 1159F MED LIST DOCD IN RCRD: CPT | Performed by: SPECIALIST

## 2024-03-27 PROCEDURE — 99024 POSTOP FOLLOW-UP VISIT: CPT | Performed by: SPECIALIST

## 2024-03-27 PROCEDURE — 1160F RVW MEDS BY RX/DR IN RCRD: CPT | Performed by: SPECIALIST

## 2024-03-27 PROCEDURE — 3078F DIAST BP <80 MM HG: CPT | Performed by: SPECIALIST
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
## 2024-03-27 NOTE — PROGRESS NOTES
Office Established Patient Note:     Referring Provider: Dr. Hung Nicole    Chief complaint follow-up draining from the left anterior chest    Subjective .     History of present illness:  Pam Clayton is a 78 y.o. female who is here for short-term follow-up, excision of a seroma cavity was completed, and drain placed, she wanted the drain out yesterday I convinced her to let me follow-up for a day she is here for follow-up of this drain.  After 24 hours.  Her drainage output is 25 cc at 18 hours it is still too much I think to come out I have further discussed this with her I will see her back on Friday and hopefully by Friday we can get the drain out..    History  Past Medical History:   Diagnosis Date    Arthritis     Bladder cancer     Bladder cancer    Breast cancer     Diabetes mellitus     GERD (gastroesophageal reflux disease)     Hard of hearing     Hx of excision of tumor of brain meninges     Hyperlipidemia     Hypertension     UTI (urinary tract infection)     Vertigo    ,   Past Surgical History:   Procedure Laterality Date    APPENDECTOMY      BRAIN TUMOR EXCISION      CHOLECYSTECTOMY      CYSTOSCOPY BLADDER BIOPSY N/A 7/25/2018    Procedure: CYSTOSCOPY BILATERAL RETROGRADE PYELOGRAM BLADDER BIOPSIES WITH FULGURATION;  Surgeon: Rj Tijerina MD;  Location:  [facility] OR;  Service: Urology    CYSTOSCOPY BLADDER BIOPSY N/A 10/31/2018    Procedure: CYSTOSCOPY BLADDER BIOPSY WITH FULGURATION;  Surgeon: Rj Tijerina MD;  Location:  [facility] OR;  Service: Urology    HYSTERECTOMY      MASTECTOMY W/ SENTINEL NODE BIOPSY Left 8/1/2023    Procedure: BREAST MASTECTOMY WITH MAG SEED GUIDED AND SENTINEL NODE BIOPSY;  Surgeon: Tonia Ledezma MD;  Location:  [facility] OR;  Service: General;  Laterality: Left;    TOTAL KNEE ARTHROPLASTY Bilateral     TRUNK LESION/CYST EXCISION Left 3/19/2024    Procedure: EXCISION CYST excision of central aspect of the scar, excision of seroma and closure over a drain. PER  "TALIA LEFT CHEST WALL;  Surgeon: Julio Preston MD;  Location: [facility] OR;  Service: General;  Laterality: Left;   ,   Family History   Problem Relation Age of Onset    No Known Problems Father     Cancer Mother     Diabetes Mother     Heart disease Mother    ,   Social History     Tobacco Use    Smoking status: Never    Smokeless tobacco: Never   Vaping Use    Vaping status: Never Used   Substance Use Topics    Alcohol use: Not Currently     Comment: \"One drink a year\"    Drug use: No   , (Not in a hospital admission)   and Allergies:  Halothane, Atorvastatin, Other, Cortisone, Lisinopril, Metformin, Mometasone furoate, Sulfa antibiotics, Tramadol, Codeine, Cortisol manager [cholestatin], Dexlansoprazole, Levaquin [levofloxacin], and Phenytoin sodium extended    Current Outpatient Medications:     Cetirizine HCl 10 MG capsule, Take 1 tablet by mouth Daily., Disp: , Rfl:     Coenzyme Q10 10 MG capsule, Take 400 mg by mouth Daily., Disp: , Rfl:     dexAMETHasone (DECADRON) 0.5 MG tablet, Take 1 tablet by mouth As Needed., Disp: , Rfl:     esomeprazole (nexIUM) 40 MG capsule, Take 1 capsule by mouth Every Morning Before Breakfast., Disp: , Rfl:     glucosamine-chondroitin 500-400 MG capsule capsule, Take 1 capsule by mouth 2 (Two) Times a Day., Disp: , Rfl:     HYDROcodone-acetaminophen (NORCO) 7.5-325 MG per tablet, Take 1 tablet by mouth Every 4 (Four) Hours As Needed for Moderate Pain (Pain)., Disp: 20 tablet, Rfl: 0    HYDROcodone-acetaminophen (NORCO) 7.5-325 MG per tablet, Take 1 tablet by mouth Every 4 (Four) Hours As Needed for Moderate Pain for up to 20 doses., Disp: 20 tablet, Rfl: 0    insulin detemir (LEVEMIR) 100 UNIT/ML injection, Inject 48 Units under the skin into the appropriate area as directed Every Night., Disp: , Rfl:     insulin glargine (LANTUS, SEMGLEE) 100 UNIT/ML injection, Inject 26 Units under the skin into the appropriate area as directed Daily. Every night, Disp: , Rfl:     insulin " "lispro protamine-insulin lispro (humaLOG 50-50) (50-50) 100 UNIT/ML suspension injection, Inject 6 Units under the skin into the appropriate area as directed 3 (Three) Times a Day., Disp: , Rfl:     ketorolac (TORADOL) 10 MG tablet, Take 1 tablet by mouth Every 6 (Six) Hours As Needed for Moderate Pain for up to 10 doses., Disp: 10 tablet, Rfl: 1    Lactobacillus-Inulin ([facility] [employer]) capsule, Take 1 tablet by mouth Daily., Disp: , Rfl:     meloxicam (MOBIC) 15 MG tablet, Take 1 tablet by mouth As Needed., Disp: , Rfl:     methylcellulose (Citrucel) oral powder, Take 2 Applications by mouth Daily for 30 doses., Disp: 60 g, Rfl: 6    metoprolol succinate XL (TOPROL-XL) 100 MG 24 hr tablet, Take 0.5 tablets by mouth Every Night., Disp: , Rfl:     ondansetron (Zofran) 8 MG tablet, Take 0.5 tablets by mouth Every 8 (Eight) Hours As Needed for Nausea or Vomiting for up to 10 doses., Disp: 10 tablet, Rfl: 1    pantoprazole (PROTONIX) 40 MG EC tablet, Take 1 tablet by mouth Daily., Disp: , Rfl:     tamoxifen (NOLVADEX) 10 MG tablet, Take 1 tablet by mouth Daily., Disp: , Rfl:     Objective     Vital Signs   There were no vitals taken for this visit.     Physical Exam:  Wound left lateral chest clean and dry, no discharge no erythema no seroma no hematoma, no clot in the to the dressing around the tube is dry, output from the drain over the past 18 hours has been 25 cc of serosanguineous fluid.    Results Review:  Result Review :  Lab Results   Component Value Date    FINALDX  03/19/2024     Left mastectomy scar seroma, excision:  Dense fibrous connective tissue with scattered inflammation and vascular congestion.      GROSSDES  03/19/2024     1. Chest, Left.   Received in a formalin filled container labeled with the patient's name, date of birth, and \"left mastectomy scar seroma\".  The specimen consists of an open sac structure measuring 6.4 x 3.8 x 2.1 cm.  The external surface is pink-gray with fibrofatty " adhesions and a skin ellipse measuring 5.5 x 1.0 cm.  A partially healed scar appears to run 2.5 cm in length.  The sac lining is pink-blue, smooth and glistening.  Sectioning reveals a dense gray-white wall averaging 0.3 cm in thickness with no focal areas of thickening identified.  A representative section is submitted in block 1A.           BMI is within normal parameters. No other follow-up for BMI required.    Assessment & Plan       Status post removal of seroma and scar presently doing well pathology is unremarkable, I will see her back on Friday hopefully by that time we could remove the drain.    Discussed BMI elevation and need to move toward a reduced BMI for health concerns she appears to understand she is a non smoker and her meds were reviewed.      Julio Preston MD  03/27/24  08:30 CDT

## 2024-03-27 NOTE — PATIENT INSTRUCTIONS
Thanks for coming today Ms. Clayton I will see back on Friday and I think by Friday we can get the drain out.

## 2024-03-29 ENCOUNTER — OFFICE VISIT (OUTPATIENT)
Dept: SURGERY | Facility: CLINIC | Age: 79
End: 2024-03-29
Payer: MEDICARE

## 2024-03-29 VITALS — WEIGHT: 157 LBS | BODY MASS INDEX: 24.64 KG/M2 | HEIGHT: 67 IN

## 2024-03-29 DIAGNOSIS — C50.912 MALIGNANT NEOPLASM OF LEFT FEMALE BREAST, UNSPECIFIED ESTROGEN RECEPTOR STATUS, UNSPECIFIED SITE OF BREAST: Primary | ICD-10-CM

## 2024-03-29 PROCEDURE — 1159F MED LIST DOCD IN RCRD: CPT | Performed by: SPECIALIST

## 2024-03-29 PROCEDURE — 1160F RVW MEDS BY RX/DR IN RCRD: CPT | Performed by: SPECIALIST

## 2024-03-29 PROCEDURE — 99024 POSTOP FOLLOW-UP VISIT: CPT | Performed by: SPECIALIST

## 2024-03-29 NOTE — PROGRESS NOTES
Office Established Patient Note:     Referring Provider: Dr. Hung Nicole    Chief complaint follow-up draining from the left anterior chest    Subjective .     History of present illness:  Pam Clayton is a 78 y.o. female currently 10 days out from exploration, excision of the scar, and underlying seroma cavity, drain is in place she is here for follow-up examination of the wound and also drain management..  In follow-up the drainage output is less than 25 cc for the past 2 days we will look toward removal of the drain.    History  Past Medical History:   Diagnosis Date    Arthritis     Bladder cancer     Bladder cancer    Breast cancer     Diabetes mellitus     GERD (gastroesophageal reflux disease)     Hard of hearing     Hx of excision of tumor of brain meninges     Hyperlipidemia     Hypertension     UTI (urinary tract infection)     Vertigo    ,   Past Surgical History:   Procedure Laterality Date    APPENDECTOMY      BRAIN TUMOR EXCISION      CHOLECYSTECTOMY      CYSTOSCOPY BLADDER BIOPSY N/A 7/25/2018    Procedure: CYSTOSCOPY BILATERAL RETROGRADE PYELOGRAM BLADDER BIOPSIES WITH FULGURATION;  Surgeon: Rj Tijerina MD;  Location:  [facility] OR;  Service: Urology    CYSTOSCOPY BLADDER BIOPSY N/A 10/31/2018    Procedure: CYSTOSCOPY BLADDER BIOPSY WITH FULGURATION;  Surgeon: Rj Tijerina MD;  Location: [facility] OR;  Service: Urology    HYSTERECTOMY      MASTECTOMY W/ SENTINEL NODE BIOPSY Left 8/1/2023    Procedure: BREAST MASTECTOMY WITH MAG SEED GUIDED AND SENTINEL NODE BIOPSY;  Surgeon: Tonia Ledezma MD;  Location:  [facility] OR;  Service: General;  Laterality: Left;    TOTAL KNEE ARTHROPLASTY Bilateral     TRUNK LESION/CYST EXCISION Left 3/19/2024    Procedure: EXCISION CYST excision of central aspect of the scar, excision of seroma and closure over a drain. PER TALIA LEFT CHEST WALL;  Surgeon: Julio Preston MD;  Location:  [facility] OR;  Service: General;  Laterality: Left;   ,   Family History  "  Problem Relation Age of Onset    No Known Problems Father     Cancer Mother     Diabetes Mother     Heart disease Mother    ,   Social History     Tobacco Use    Smoking status: Never    Smokeless tobacco: Never   Vaping Use    Vaping status: Never Used   Substance Use Topics    Alcohol use: Not Currently     Comment: \"One drink a year\"    Drug use: No   , (Not in a hospital admission)   and Allergies:  Halothane, Atorvastatin, Other, Cortisone, Lisinopril, Metformin, Mometasone furoate, Sulfa antibiotics, Tramadol, Codeine, Cortisol manager [cholestatin], Dexlansoprazole, Levaquin [levofloxacin], and Phenytoin sodium extended    Current Outpatient Medications:     Cetirizine HCl 10 MG capsule, Take 1 tablet by mouth Daily., Disp: , Rfl:     Coenzyme Q10 10 MG capsule, Take 400 mg by mouth Daily., Disp: , Rfl:     dexAMETHasone (DECADRON) 0.5 MG tablet, Take 1 tablet by mouth As Needed., Disp: , Rfl:     esomeprazole (nexIUM) 40 MG capsule, Take 1 capsule by mouth Every Morning Before Breakfast., Disp: , Rfl:     glucosamine-chondroitin 500-400 MG capsule capsule, Take 1 capsule by mouth 2 (Two) Times a Day., Disp: , Rfl:     HYDROcodone-acetaminophen (NORCO) 7.5-325 MG per tablet, Take 1 tablet by mouth Every 4 (Four) Hours As Needed for Moderate Pain (Pain)., Disp: 20 tablet, Rfl: 0    HYDROcodone-acetaminophen (NORCO) 7.5-325 MG per tablet, Take 1 tablet by mouth Every 4 (Four) Hours As Needed for Moderate Pain for up to 20 doses., Disp: 20 tablet, Rfl: 0    insulin detemir (LEVEMIR) 100 UNIT/ML injection, Inject 48 Units under the skin into the appropriate area as directed Every Night., Disp: , Rfl:     insulin glargine (LANTUS, SEMGLEE) 100 UNIT/ML injection, Inject 26 Units under the skin into the appropriate area as directed Daily. Every night, Disp: , Rfl:     insulin lispro protamine-insulin lispro (humaLOG 50-50) (50-50) 100 UNIT/ML suspension injection, Inject 6 Units under the skin into the appropriate " "area as directed 3 (Three) Times a Day., Disp: , Rfl:     ketorolac (TORADOL) 10 MG tablet, Take 1 tablet by mouth Every 6 (Six) Hours As Needed for Moderate Pain for up to 10 doses., Disp: 10 tablet, Rfl: 1    Lactobacillus-Inulin ([facility] [employer]) capsule, Take 1 tablet by mouth Daily., Disp: , Rfl:     meloxicam (MOBIC) 15 MG tablet, Take 1 tablet by mouth As Needed., Disp: , Rfl:     methylcellulose (Citrucel) oral powder, Take 2 Applications by mouth Daily for 30 doses., Disp: 60 g, Rfl: 6    metoprolol succinate XL (TOPROL-XL) 100 MG 24 hr tablet, Take 0.5 tablets by mouth Every Night., Disp: , Rfl:     ondansetron (Zofran) 8 MG tablet, Take 0.5 tablets by mouth Every 8 (Eight) Hours As Needed for Nausea or Vomiting for up to 10 doses., Disp: 10 tablet, Rfl: 1    pantoprazole (PROTONIX) 40 MG EC tablet, Take 1 tablet by mouth Daily., Disp: , Rfl:     tamoxifen (NOLVADEX) 10 MG tablet, Take 1 tablet by mouth Daily., Disp: , Rfl:     Objective     Vital Signs   There were no vitals taken for this visit.     Physical Exam:  Chest clear and equal, wound clean and dry no discharge no erythema no seroma no hematoma, the drain was removed without difficulty.  Pathology showed unremarkable seroma cavity and connective tissue.    Results Review:  Result Review :  Lab Results   Component Value Date    FINALDX  03/19/2024     Left mastectomy scar seroma, excision:  Dense fibrous connective tissue with scattered inflammation and vascular congestion.      GROSSDES  03/19/2024     1. Chest, Left.   Received in a formalin filled container labeled with the patient's name, date of birth, and \"left mastectomy scar seroma\".  The specimen consists of an open sac structure measuring 6.4 x 3.8 x 2.1 cm.  The external surface is pink-gray with fibrofatty adhesions and a skin ellipse measuring 5.5 x 1.0 cm.  A partially healed scar appears to run 2.5 cm in length.  The sac lining is pink-blue, smooth and glistening.  " Sectioning reveals a dense gray-white wall averaging 0.3 cm in thickness with no focal areas of thickening identified.  A representative section is submitted in block 1A.           BMI is within normal parameters. No other follow-up for BMI required.    Assessment & Plan     Status post excision of a seroma cavity 10 days prior no evidence of any further fluid she will be discharged to follow-up with me in 1 week to assure full treatment of the seroma.    Discussed BMI elevation and need to move toward a reduced BMI for health concerns she appears to understand she is a non smoker and her meds were reviewed.      Julio Preston MD  03/29/24  08:32 CDT

## 2024-03-29 NOTE — PATIENT INSTRUCTIONS
Thanks for coming today Ms. Clayton I think the output from your drain is very minimal in all probability this seroma has been treated follow-up with me in 1 week.

## 2024-04-02 NOTE — PROGRESS NOTES
Office Established Patient Note:     Referring Provider: Dr. Hung Nicole    Chief complaint follow-up draining from the left anterior chest    Subjective .     History of present illness:  Pam Clayton is a 78 y.o. female currently 3 weeks out from exploration and excision of the underlying scar and underlying seroma cavity with drain placement drain was removed 1 week prior here for follow-up..    She desperately wanted the drain out last week we took the drain out unfortunately she has a seroma present we sterilely aspirated this yielding 50 cc of serosanguineous fluid.    History  Past Medical History:   Diagnosis Date    Arthritis     Bladder cancer     Bladder cancer    Breast cancer     Diabetes mellitus     GERD (gastroesophageal reflux disease)     Hard of hearing     Hx of excision of tumor of brain meninges     Hyperlipidemia     Hypertension     UTI (urinary tract infection)     Vertigo    ,   Past Surgical History:   Procedure Laterality Date    APPENDECTOMY      BRAIN TUMOR EXCISION      CHOLECYSTECTOMY      CYSTOSCOPY BLADDER BIOPSY N/A 7/25/2018    Procedure: CYSTOSCOPY BILATERAL RETROGRADE PYELOGRAM BLADDER BIOPSIES WITH FULGURATION;  Surgeon: Rj Tijerina MD;  Location:  [facility] OR;  Service: Urology    CYSTOSCOPY BLADDER BIOPSY N/A 10/31/2018    Procedure: CYSTOSCOPY BLADDER BIOPSY WITH FULGURATION;  Surgeon: Rj Tijerina MD;  Location: [facility] OR;  Service: Urology    HYSTERECTOMY      MASTECTOMY W/ SENTINEL NODE BIOPSY Left 8/1/2023    Procedure: BREAST MASTECTOMY WITH MAG SEED GUIDED AND SENTINEL NODE BIOPSY;  Surgeon: Tonia Ledezma MD;  Location:  [facility] OR;  Service: General;  Laterality: Left;    TOTAL KNEE ARTHROPLASTY Bilateral     TRUNK LESION/CYST EXCISION Left 3/19/2024    Procedure: EXCISION CYST excision of central aspect of the scar, excision of seroma and closure over a drain. VISHAL MELGAR LEFT CHEST WALL;  Surgeon: Julio Preston MD;  Location:  [facility] OR;  Service:  "General;  Laterality: Left;   ,   Family History   Problem Relation Age of Onset    No Known Problems Father     Cancer Mother     Diabetes Mother     Heart disease Mother    ,   Social History     Tobacco Use    Smoking status: Never    Smokeless tobacco: Never   Vaping Use    Vaping status: Never Used   Substance Use Topics    Alcohol use: Not Currently     Comment: \"One drink a year\"    Drug use: No   , (Not in a hospital admission)   and Allergies:  Halothane, Atorvastatin, Other, Cortisone, Lisinopril, Metformin, Mometasone furoate, Sulfa antibiotics, Tramadol, Codeine, Cortisol manager [cholestatin], Dexlansoprazole, Levaquin [levofloxacin], and Phenytoin sodium extended    Current Outpatient Medications:     Cetirizine HCl 10 MG capsule, Take 1 tablet by mouth Daily., Disp: , Rfl:     Coenzyme Q10 10 MG capsule, Take 400 mg by mouth Daily., Disp: , Rfl:     dexAMETHasone (DECADRON) 0.5 MG tablet, Take 1 tablet by mouth As Needed., Disp: , Rfl:     esomeprazole (nexIUM) 40 MG capsule, Take 1 capsule by mouth Every Morning Before Breakfast., Disp: , Rfl:     glucosamine-chondroitin 500-400 MG capsule capsule, Take 1 capsule by mouth 2 (Two) Times a Day., Disp: , Rfl:     HYDROcodone-acetaminophen (NORCO) 7.5-325 MG per tablet, Take 1 tablet by mouth Every 4 (Four) Hours As Needed for Moderate Pain (Pain)., Disp: 20 tablet, Rfl: 0    HYDROcodone-acetaminophen (NORCO) 7.5-325 MG per tablet, Take 1 tablet by mouth Every 4 (Four) Hours As Needed for Moderate Pain for up to 20 doses., Disp: 20 tablet, Rfl: 0    insulin detemir (LEVEMIR) 100 UNIT/ML injection, Inject 48 Units under the skin into the appropriate area as directed Every Night., Disp: , Rfl:     insulin glargine (LANTUS, SEMGLEE) 100 UNIT/ML injection, Inject 26 Units under the skin into the appropriate area as directed Daily. Every night, Disp: , Rfl:     insulin lispro protamine-insulin lispro (humaLOG 50-50) (50-50) 100 UNIT/ML suspension injection, " "Inject 6 Units under the skin into the appropriate area as directed 3 (Three) Times a Day., Disp: , Rfl:     ketorolac (TORADOL) 10 MG tablet, Take 1 tablet by mouth Every 6 (Six) Hours As Needed for Moderate Pain for up to 10 doses., Disp: 10 tablet, Rfl: 1    Lactobacillus-Inulin ([facility] [employer]) capsule, Take 1 tablet by mouth Daily., Disp: , Rfl:     meloxicam (MOBIC) 15 MG tablet, Take 1 tablet by mouth As Needed., Disp: , Rfl:     methylcellulose (Citrucel) oral powder, Take 2 Applications by mouth Daily for 30 doses., Disp: 60 g, Rfl: 6    metoprolol succinate XL (TOPROL-XL) 100 MG 24 hr tablet, Take 0.5 tablets by mouth Every Night., Disp: , Rfl:     ondansetron (Zofran) 8 MG tablet, Take 0.5 tablets by mouth Every 8 (Eight) Hours As Needed for Nausea or Vomiting for up to 10 doses., Disp: 10 tablet, Rfl: 1    pantoprazole (PROTONIX) 40 MG EC tablet, Take 1 tablet by mouth Daily., Disp: , Rfl:     tamoxifen (NOLVADEX) 10 MG tablet, Take 1 tablet by mouth Daily., Disp: , Rfl:     Objective     Vital Signs   There were no vitals taken for this visit.     Physical Exam:  Seroma present left anterior chest there was aspirated 50 cc of serosanguineous fluid, full resolution occurred, we will treat her with Lasix 20 mg every other day, follow-up with me next Wednesday for repeat evaluation repeat aspiration.  If needed    Results Review:  Result Review :  Lab Results   Component Value Date    FINALDX  03/19/2024     Left mastectomy scar seroma, excision:  Dense fibrous connective tissue with scattered inflammation and vascular congestion.      GROSSDES  03/19/2024     1. Chest, Left.   Received in a formalin filled container labeled with the patient's name, date of birth, and \"left mastectomy scar seroma\".  The specimen consists of an open sac structure measuring 6.4 x 3.8 x 2.1 cm.  The external surface is pink-gray with fibrofatty adhesions and a skin ellipse measuring 5.5 x 1.0 cm.  A partially " healed scar appears to run 2.5 cm in length.  The sac lining is pink-blue, smooth and glistening.  Sectioning reveals a dense gray-white wall averaging 0.3 cm in thickness with no focal areas of thickening identified.  A representative section is submitted in block 1A.           BMI is within normal parameters. No other follow-up for BMI required.    Assessment & Plan     Here for follow-up status post excision of seroma and drainage, presently doing well follow-up with me in 1 week we will begin Lasix 20 mg every other day.    Discussed BMI elevation and need to move toward a reduced BMI for health concerns she appears to understand she is a non smoker and her meds were reviewed.      Julio Preston MD  04/02/24  09:29 CDT

## 2024-04-03 ENCOUNTER — OFFICE VISIT (OUTPATIENT)
Dept: SURGERY | Facility: CLINIC | Age: 79
End: 2024-04-03
Payer: MEDICARE

## 2024-04-03 VITALS
DIASTOLIC BLOOD PRESSURE: 72 MMHG | SYSTOLIC BLOOD PRESSURE: 134 MMHG | OXYGEN SATURATION: 97 % | HEIGHT: 67 IN | WEIGHT: 157 LBS | BODY MASS INDEX: 24.64 KG/M2 | HEART RATE: 76 BPM

## 2024-04-03 DIAGNOSIS — C50.912 MALIGNANT NEOPLASM OF LEFT FEMALE BREAST, UNSPECIFIED ESTROGEN RECEPTOR STATUS, UNSPECIFIED SITE OF BREAST: Primary | ICD-10-CM

## 2024-04-03 RX ORDER — FUROSEMIDE 20 MG/1
20 TABLET ORAL EVERY OTHER DAY
Qty: 10 TABLET | Refills: 1 | Status: SHIPPED | OUTPATIENT
Start: 2024-04-03 | End: 2025-04-03

## 2024-04-03 NOTE — PATIENT INSTRUCTIONS
Nice to see you today Ms. Clayton I will see you back in 1 week, please take the Lasix 1 pill every other day follow-up with me next Wednesday.

## 2024-04-09 NOTE — PROGRESS NOTES
Office Established Patient Note:     Referring Provider: Dr. Hung Nicole    Chief complaint seroma left anterior chest.    Subjective .     History of present illness:  Pam Clayton is a 78 y.o. female  currently 4 weeks out from exploration and excision of the underlying scar and underlying seroma cavity with drain placement drain was removed 1 week prior here for follow-up..     She desperately wanted the drain out last week we took the drain out unfortunately she has a seroma present we sterilely aspirated this yielding 50 cc of serosanguineous fluid.      Follow-up on 10 April still shows a seroma fluid, we aspirated the area 50 cc of blood-tinged fluid, with full resolution of this cavity, once again questions were asked why this is happening once again we told her that seromas occur once again we told her that treatment is excision of the cavity which we did and leaving the drain until it is draining minimal.  She did not tolerate the drain she was at wits end about the drain and would not let the drain stay in.  As result we remove the drain too early and the seroma has recurred.  She asked why this happens once again we really accomplish the story and advised her that we will left the drain and are gradually backed it out to include even backing out with the drain openings present.    History  Past Medical History:   Diagnosis Date    Arthritis     Bladder cancer     Bladder cancer    Breast cancer     Diabetes mellitus     GERD (gastroesophageal reflux disease)     Hard of hearing     Hx of excision of tumor of brain meninges     Hyperlipidemia     Hypertension     UTI (urinary tract infection)     Vertigo    ,   Past Surgical History:   Procedure Laterality Date    APPENDECTOMY      BRAIN TUMOR EXCISION      CHOLECYSTECTOMY      CYSTOSCOPY BLADDER BIOPSY N/A 7/25/2018    Procedure: CYSTOSCOPY BILATERAL RETROGRADE PYELOGRAM BLADDER BIOPSIES WITH FULGURATION;  Surgeon: Rj Tijerina MD;  Location:  " [facility];  Service: Urology    CYSTOSCOPY BLADDER BIOPSY N/A 10/31/2018    Procedure: CYSTOSCOPY BLADDER BIOPSY WITH FULGURATION;  Surgeon: Rj Tijerina MD;  Location:  [facility];  Service: Urology    HYSTERECTOMY      MASTECTOMY W/ SENTINEL NODE BIOPSY Left 8/1/2023    Procedure: BREAST MASTECTOMY WITH MAG SEED GUIDED AND SENTINEL NODE BIOPSY;  Surgeon: Tonia Ledezma MD;  Location:  [facility];  Service: General;  Laterality: Left;    TOTAL KNEE ARTHROPLASTY Bilateral     TRUNK LESION/CYST EXCISION Left 3/19/2024    Procedure: EXCISION CYST excision of central aspect of the scar, excision of seroma and closure over a drain. PER TALIA LEFT CHEST WALL;  Surgeon: Julio Preston MD;  Location:  [facility];  Service: General;  Laterality: Left;   ,   Family History   Problem Relation Age of Onset    No Known Problems Father     Cancer Mother     Diabetes Mother     Heart disease Mother    ,   Social History     Tobacco Use    Smoking status: Never    Smokeless tobacco: Never   Vaping Use    Vaping status: Never Used   Substance Use Topics    Alcohol use: Not Currently     Comment: \"One drink a year\"    Drug use: No   , (Not in a hospital admission)   and Allergies:  Halothane, Atorvastatin, Other, Cortisone, Lisinopril, Metformin, Mometasone furoate, Sulfa antibiotics, Tramadol, Codeine, Cortisol manager [cholestatin], Dexlansoprazole, Levaquin [levofloxacin], and Phenytoin sodium extended    Current Outpatient Medications:     Cetirizine HCl 10 MG capsule, Take 1 tablet by mouth Daily., Disp: , Rfl:     Coenzyme Q10 10 MG capsule, Take 400 mg by mouth Daily., Disp: , Rfl:     dexAMETHasone (DECADRON) 0.5 MG tablet, Take 1 tablet by mouth As Needed., Disp: , Rfl:     esomeprazole (nexIUM) 40 MG capsule, Take 1 capsule by mouth Every Morning Before Breakfast., Disp: , Rfl:     furosemide (Lasix) 20 MG tablet, Take 1 tablet by mouth Every Other Day., Disp: 10 tablet, Rfl: 1    glucosamine-chondroitin 500-400 MG " capsule capsule, Take 1 capsule by mouth 2 (Two) Times a Day., Disp: , Rfl:     HYDROcodone-acetaminophen (NORCO) 7.5-325 MG per tablet, Take 1 tablet by mouth Every 4 (Four) Hours As Needed for Moderate Pain (Pain)., Disp: 20 tablet, Rfl: 0    HYDROcodone-acetaminophen (NORCO) 7.5-325 MG per tablet, Take 1 tablet by mouth Every 4 (Four) Hours As Needed for Moderate Pain for up to 20 doses., Disp: 20 tablet, Rfl: 0    insulin detemir (LEVEMIR) 100 UNIT/ML injection, Inject 48 Units under the skin into the appropriate area as directed Every Night., Disp: , Rfl:     insulin glargine (LANTUS, SEMGLEE) 100 UNIT/ML injection, Inject 26 Units under the skin into the appropriate area as directed Daily. Every night, Disp: , Rfl:     insulin lispro protamine-insulin lispro (humaLOG 50-50) (50-50) 100 UNIT/ML suspension injection, Inject 6 Units under the skin into the appropriate area as directed 3 (Three) Times a Day., Disp: , Rfl:     ketorolac (TORADOL) 10 MG tablet, Take 1 tablet by mouth Every 6 (Six) Hours As Needed for Moderate Pain for up to 10 doses., Disp: 10 tablet, Rfl: 1    Lactobacillus-Inulin ([facility] & [employer]) capsule, Take 1 tablet by mouth Daily., Disp: , Rfl:     meloxicam (MOBIC) 15 MG tablet, Take 1 tablet by mouth As Needed., Disp: , Rfl:     methylcellulose (Citrucel) oral powder, Take 2 Applications by mouth Daily for 30 doses., Disp: 60 g, Rfl: 6    metoprolol succinate XL (TOPROL-XL) 100 MG 24 hr tablet, Take 0.5 tablets by mouth Every Night., Disp: , Rfl:     ondansetron (Zofran) 8 MG tablet, Take 0.5 tablets by mouth Every 8 (Eight) Hours As Needed for Nausea or Vomiting for up to 10 doses., Disp: 10 tablet, Rfl: 1    pantoprazole (PROTONIX) 40 MG EC tablet, Take 1 tablet by mouth Daily., Disp: , Rfl:     tamoxifen (NOLVADEX) 10 MG tablet, Take 1 tablet by mouth Daily., Disp: , Rfl:     Objective     Vital Signs   There were no vitals taken for this visit.     Physical Exam:  Seroma  "present in the left anterior chest, full aspiration completed sterilely with 50 cc of blood-tinged fluid removed.  Full resolution of the seroma.    Results Review:  Result Review :  Lab Results   Component Value Date    FINALDX  03/19/2024     Left mastectomy scar seroma, excision:  Dense fibrous connective tissue with scattered inflammation and vascular congestion.      GROSSDES  03/19/2024     1. Chest, Left.   Received in a formalin filled container labeled with the patient's name, date of birth, and \"left mastectomy scar seroma\".  The specimen consists of an open sac structure measuring 6.4 x 3.8 x 2.1 cm.  The external surface is pink-gray with fibrofatty adhesions and a skin ellipse measuring 5.5 x 1.0 cm.  A partially healed scar appears to run 2.5 cm in length.  The sac lining is pink-blue, smooth and glistening.  Sectioning reveals a dense gray-white wall averaging 0.3 cm in thickness with no focal areas of thickening identified.  A representative section is submitted in block 1A.           BMI is within normal parameters. No other follow-up for BMI required.    Assessment & Plan     Follow-up with me in 1 week for repeat evaluation and repeat aspiration if needed.  Continue on Lasix every other day, continue albuterol to place pressure over the area.  Potentially she would need to have follow-up with Dr. Aggarwal in 3 to 4 weeks for repeat aspiration as needed.    Discussed BMI elevation and need to move toward a reduced BMI for health concerns she appears to understand she is a non smoker and her meds were reviewed.      Julio Preston MD  04/09/24  14:47 CDT            "

## 2024-04-10 ENCOUNTER — OFFICE VISIT (OUTPATIENT)
Dept: SURGERY | Facility: CLINIC | Age: 79
End: 2024-04-10
Payer: MEDICARE

## 2024-04-10 VITALS
SYSTOLIC BLOOD PRESSURE: 139 MMHG | OXYGEN SATURATION: 96 % | HEIGHT: 66 IN | BODY MASS INDEX: 25.07 KG/M2 | WEIGHT: 156 LBS | HEART RATE: 73 BPM | DIASTOLIC BLOOD PRESSURE: 85 MMHG

## 2024-04-10 DIAGNOSIS — C50.912 MALIGNANT NEOPLASM OF LEFT FEMALE BREAST, UNSPECIFIED ESTROGEN RECEPTOR STATUS, UNSPECIFIED SITE OF BREAST: Primary | ICD-10-CM

## 2024-04-16 NOTE — PROGRESS NOTES
Office Established Patient Note:     Referring Provider: Dr. Hung Nicole    Chief complaint follow-up seroma left anterior chest    Subjective .     History of present illness:  Pam Clayton is a 78 y.o. female   currently 5 weeks out from exploration and excision of the underlying scar and underlying seroma cavity with drain placement drain was removed 1 week prior here for follow-up..     She desperately wanted the drain out last week we took the drain out unfortunately she has a seroma present we sterilely aspirated this yielding 50 cc of serosanguineous fluid.        Follow-up on 10 April still shows a seroma fluid, we aspirated the area 50 cc of blood-tinged fluid, with full resolution of this cavity, once again questions were asked why this is happening once again we told her that seromas occur once again we told her that treatment is excision of the cavity which we did and leaving the drain until it is draining minimal.  She did not tolerate the drain she was at wits end about the drain and would not let the drain stay in.  As result we remove the drain too early and the seroma has recurred.  She asked why this happens once again we really accomplish the story and advised her that we will left the drain and are gradually backed it out to include even backing out with the drain openings present.    She is here for follow-up, as noted she is here for follow-up of this seroma, it seems to be quite a bit less than it has been, but we will aspirate and further treat.    History  Past Medical History:   Diagnosis Date    Arthritis     Bladder cancer     Bladder cancer    Breast cancer     Diabetes mellitus     GERD (gastroesophageal reflux disease)     Hard of hearing     Hx of excision of tumor of brain meninges     Hyperlipidemia     Hypertension     UTI (urinary tract infection)     Vertigo    ,   Past Surgical History:   Procedure Laterality Date    APPENDECTOMY      BRAIN TUMOR EXCISION       "CHOLECYSTECTOMY      CYSTOSCOPY BLADDER BIOPSY N/A 7/25/2018    Procedure: CYSTOSCOPY BILATERAL RETROGRADE PYELOGRAM BLADDER BIOPSIES WITH FULGURATION;  Surgeon: Rj Tijerina MD;  Location:  [facility];  Service: Urology    CYSTOSCOPY BLADDER BIOPSY N/A 10/31/2018    Procedure: CYSTOSCOPY BLADDER BIOPSY WITH FULGURATION;  Surgeon: Rj Tijerina MD;  Location:  [facility];  Service: Urology    HYSTERECTOMY      MASTECTOMY W/ SENTINEL NODE BIOPSY Left 8/1/2023    Procedure: BREAST MASTECTOMY WITH MAG SEED GUIDED AND SENTINEL NODE BIOPSY;  Surgeon: Tonia Ledezma MD;  Location:  [facility];  Service: General;  Laterality: Left;    TOTAL KNEE ARTHROPLASTY Bilateral     TRUNK LESION/CYST EXCISION Left 3/19/2024    Procedure: EXCISION CYST excision of central aspect of the scar, excision of seroma and closure over a drain. PER TALIA LEFT CHEST WALL;  Surgeon: Julio Preston MD;  Location:  [facility];  Service: General;  Laterality: Left;   ,   Family History   Problem Relation Age of Onset    No Known Problems Father     Cancer Mother     Diabetes Mother     Heart disease Mother    ,   Social History     Tobacco Use    Smoking status: Never    Smokeless tobacco: Never   Vaping Use    Vaping status: Never Used   Substance Use Topics    Alcohol use: Not Currently     Comment: \"One drink a year\"    Drug use: No   , (Not in a hospital admission)   and Allergies:  Halothane, Atorvastatin, Other, Cortisone, Lisinopril, Metformin, Mometasone furoate, Sulfa antibiotics, Tramadol, Codeine, Cortisol manager [cholestatin], Dexlansoprazole, Levaquin [levofloxacin], and Phenytoin sodium extended    Current Outpatient Medications:     Cetirizine HCl 10 MG capsule, Take 1 tablet by mouth Daily., Disp: , Rfl:     Coenzyme Q10 10 MG capsule, Take 400 mg by mouth Daily., Disp: , Rfl:     dexAMETHasone (DECADRON) 0.5 MG tablet, Take 1 tablet by mouth As Needed., Disp: , Rfl:     esomeprazole (nexIUM) 40 MG capsule, Take 1 " capsule by mouth Every Morning Before Breakfast., Disp: , Rfl:     furosemide (Lasix) 20 MG tablet, Take 1 tablet by mouth Every Other Day., Disp: 10 tablet, Rfl: 1    glucosamine-chondroitin 500-400 MG capsule capsule, Take 1 capsule by mouth 2 (Two) Times a Day., Disp: , Rfl:     HYDROcodone-acetaminophen (NORCO) 7.5-325 MG per tablet, Take 1 tablet by mouth Every 4 (Four) Hours As Needed for Moderate Pain (Pain)., Disp: 20 tablet, Rfl: 0    HYDROcodone-acetaminophen (NORCO) 7.5-325 MG per tablet, Take 1 tablet by mouth Every 4 (Four) Hours As Needed for Moderate Pain for up to 20 doses., Disp: 20 tablet, Rfl: 0    insulin detemir (LEVEMIR) 100 UNIT/ML injection, Inject 48 Units under the skin into the appropriate area as directed Every Night., Disp: , Rfl:     insulin glargine (LANTUS, SEMGLEE) 100 UNIT/ML injection, Inject 26 Units under the skin into the appropriate area as directed Daily. Every night, Disp: , Rfl:     insulin lispro protamine-insulin lispro (humaLOG 50-50) (50-50) 100 UNIT/ML suspension injection, Inject 6 Units under the skin into the appropriate area as directed 3 (Three) Times a Day., Disp: , Rfl:     ketorolac (TORADOL) 10 MG tablet, Take 1 tablet by mouth Every 6 (Six) Hours As Needed for Moderate Pain for up to 10 doses., Disp: 10 tablet, Rfl: 1    Lactobacillus-Inulin ([facility] & [place]) capsule, Take 1 tablet by mouth Daily., Disp: , Rfl:     meloxicam (MOBIC) 15 MG tablet, Take 1 tablet by mouth As Needed., Disp: , Rfl:     methylcellulose (Citrucel) oral powder, Take 2 Applications by mouth Daily for 30 doses., Disp: 60 g, Rfl: 6    metoprolol succinate XL (TOPROL-XL) 100 MG 24 hr tablet, Take 0.5 tablets by mouth Every Night., Disp: , Rfl:     ondansetron (Zofran) 8 MG tablet, Take 0.5 tablets by mouth Every 8 (Eight) Hours As Needed for Nausea or Vomiting for up to 10 doses., Disp: 10 tablet, Rfl: 1    pantoprazole (PROTONIX) 40 MG EC tablet, Take 1 tablet by mouth  "Daily., Disp: , Rfl:     tamoxifen (NOLVADEX) 10 MG tablet, Take 1 tablet by mouth Daily., Disp: , Rfl:     Objective     Vital Signs   There were no vitals taken for this visit.     Physical Exam:  Left chest with a seroma present, the area was cleaned and prepped and aspirated, yielding 30 cc of serosanguineous fluid, with full resolution of the cavity.    Results Review:  Result Review :  Lab Results   Component Value Date    FINALDX  03/19/2024     Left mastectomy scar seroma, excision:  Dense fibrous connective tissue with scattered inflammation and vascular congestion.      GROSSDES  03/19/2024     1. Chest, Left.   Received in a formalin filled container labeled with the patient's name, date of birth, and \"left mastectomy scar seroma\".  The specimen consists of an open sac structure measuring 6.4 x 3.8 x 2.1 cm.  The external surface is pink-gray with fibrofatty adhesions and a skin ellipse measuring 5.5 x 1.0 cm.  A partially healed scar appears to run 2.5 cm in length.  The sac lining is pink-blue, smooth and glistening.  Sectioning reveals a dense gray-white wall averaging 0.3 cm in thickness with no focal areas of thickening identified.  A representative section is submitted in block 1A.           BMI is within normal parameters. No other follow-up for BMI required.    Assessment & Plan     Status post mastectomy with postoperative seroma presently we will look toward repeat aspiration on 1 November if present and if the seroma continues we will ask Dr. Aggarwal to follow-up and aspirate as needed.  Initially mastectomy completed by Dr. Tonia Dixon in August.    Discussed BMI elevation and need to move toward a reduced BMI for health concerns she appears to understand she is a non smoker and her meds were reviewed.      Julio Preston MD  04/16/24  08:48 CDT            "

## 2024-04-17 ENCOUNTER — OFFICE VISIT (OUTPATIENT)
Dept: SURGERY | Facility: CLINIC | Age: 79
End: 2024-04-17
Payer: MEDICARE

## 2024-04-17 VITALS
DIASTOLIC BLOOD PRESSURE: 82 MMHG | OXYGEN SATURATION: 98 % | WEIGHT: 156 LBS | HEIGHT: 67 IN | SYSTOLIC BLOOD PRESSURE: 138 MMHG | HEART RATE: 72 BPM | BODY MASS INDEX: 24.48 KG/M2

## 2024-04-17 DIAGNOSIS — C50.912 MALIGNANT NEOPLASM OF LEFT FEMALE BREAST, UNSPECIFIED ESTROGEN RECEPTOR STATUS, UNSPECIFIED SITE OF BREAST: Primary | ICD-10-CM

## 2024-04-17 PROCEDURE — 1160F RVW MEDS BY RX/DR IN RCRD: CPT | Performed by: SPECIALIST

## 2024-04-17 PROCEDURE — 3079F DIAST BP 80-89 MM HG: CPT | Performed by: SPECIALIST

## 2024-04-17 PROCEDURE — 3075F SYST BP GE 130 - 139MM HG: CPT | Performed by: SPECIALIST

## 2024-04-17 PROCEDURE — 99024 POSTOP FOLLOW-UP VISIT: CPT | Performed by: SPECIALIST

## 2024-04-17 PROCEDURE — 1159F MED LIST DOCD IN RCRD: CPT | Performed by: SPECIALIST

## 2024-04-17 NOTE — PATIENT INSTRUCTIONS
Thanks for coming today Ms. Clayton great news with the fluid less than 30 cc, to less than 7 cc a day, I will see you back on 1 May for reevaluation.

## 2024-05-10 ENCOUNTER — TRANSCRIBE ORDERS (OUTPATIENT)
Dept: ADMINISTRATIVE | Facility: HOSPITAL | Age: 79
End: 2024-05-10
Payer: MEDICARE

## 2024-05-10 DIAGNOSIS — R07.9 CHEST PAIN, UNSPECIFIED TYPE: Primary | ICD-10-CM

## 2024-05-16 ENCOUNTER — TRANSCRIBE ORDERS (OUTPATIENT)
Dept: ADMINISTRATIVE | Facility: HOSPITAL | Age: 79
End: 2024-05-16
Payer: MEDICARE

## 2024-05-24 ENCOUNTER — TRANSCRIBE ORDERS (OUTPATIENT)
Dept: ADMINISTRATIVE | Facility: HOSPITAL | Age: 79
End: 2024-05-24
Payer: MEDICARE

## 2024-05-24 ENCOUNTER — LAB (OUTPATIENT)
Dept: LAB | Facility: HOSPITAL | Age: 79
End: 2024-05-24
Payer: MEDICARE

## 2024-05-24 DIAGNOSIS — R53.83 OTHER FATIGUE: ICD-10-CM

## 2024-05-24 DIAGNOSIS — I10 ESSENTIAL (PRIMARY) HYPERTENSION: ICD-10-CM

## 2024-05-24 DIAGNOSIS — E11.65 TYPE 2 DIABETES MELLITUS WITH HYPERGLYCEMIA, UNSPECIFIED WHETHER LONG TERM INSULIN USE: Primary | ICD-10-CM

## 2024-05-24 DIAGNOSIS — E11.65 TYPE 2 DIABETES MELLITUS WITH HYPERGLYCEMIA, UNSPECIFIED WHETHER LONG TERM INSULIN USE: ICD-10-CM

## 2024-05-24 LAB
ALBUMIN SERPL-MCNC: 4.3 G/DL (ref 3.5–5.2)
ALBUMIN UR-MCNC: <1.2 MG/DL
ALBUMIN/GLOB SERPL: 1.6 G/DL
ALP SERPL-CCNC: 90 U/L (ref 39–117)
ALT SERPL W P-5'-P-CCNC: 15 U/L (ref 1–33)
ANION GAP SERPL CALCULATED.3IONS-SCNC: 12 MMOL/L (ref 5–15)
AST SERPL-CCNC: 19 U/L (ref 1–32)
BASOPHILS # BLD AUTO: 0.1 10*3/MM3 (ref 0–0.2)
BASOPHILS NFR BLD AUTO: 1 % (ref 0–1.5)
BILIRUB SERPL-MCNC: 0.4 MG/DL (ref 0–1.2)
BILIRUB UR QL STRIP: NEGATIVE
BUN SERPL-MCNC: 13 MG/DL (ref 8–23)
BUN/CREAT SERPL: 14.9 (ref 7–25)
CALCIUM SPEC-SCNC: 9.6 MG/DL (ref 8.6–10.5)
CHLORIDE SERPL-SCNC: 105 MMOL/L (ref 98–107)
CHOLEST SERPL-MCNC: 214 MG/DL (ref 0–200)
CLARITY UR: CLEAR
CO2 SERPL-SCNC: 26 MMOL/L (ref 22–29)
COLOR UR: YELLOW
CREAT SERPL-MCNC: 0.87 MG/DL (ref 0.57–1)
CREAT UR-MCNC: 121.6 MG/DL
DEPRECATED RDW RBC AUTO: 40.4 FL (ref 37–54)
EGFRCR SERPLBLD CKD-EPI 2021: 69.1 ML/MIN/1.73
EOSINOPHIL # BLD AUTO: 0.3 10*3/MM3 (ref 0–0.4)
EOSINOPHIL NFR BLD AUTO: 3.1 % (ref 0.3–6.2)
ERYTHROCYTE [DISTWIDTH] IN BLOOD BY AUTOMATED COUNT: 12.3 % (ref 12.3–15.4)
GLOBULIN UR ELPH-MCNC: 2.7 GM/DL
GLUCOSE SERPL-MCNC: 146 MG/DL (ref 65–99)
GLUCOSE UR STRIP-MCNC: NEGATIVE MG/DL
HBA1C MFR BLD: 7.9 % (ref 4.8–5.6)
HCT VFR BLD AUTO: 42.1 % (ref 34–46.6)
HDLC SERPL-MCNC: 38 MG/DL (ref 40–60)
HGB BLD-MCNC: 13.3 G/DL (ref 12–15.9)
HGB UR QL STRIP.AUTO: NEGATIVE
IMM GRANULOCYTES # BLD AUTO: 0.02 10*3/MM3 (ref 0–0.05)
IMM GRANULOCYTES NFR BLD AUTO: 0.2 % (ref 0–0.5)
KETONES UR QL STRIP: NEGATIVE
LDLC SERPL CALC-MCNC: 121 MG/DL (ref 0–100)
LDLC/HDLC SERPL: 2.99 {RATIO}
LEUKOCYTE ESTERASE UR QL STRIP.AUTO: NEGATIVE
LYMPHOCYTES # BLD AUTO: 3.01 10*3/MM3 (ref 0.7–3.1)
LYMPHOCYTES NFR BLD AUTO: 31.5 % (ref 19.6–45.3)
MCH RBC QN AUTO: 28.1 PG (ref 26.6–33)
MCHC RBC AUTO-ENTMCNC: 31.6 G/DL (ref 31.5–35.7)
MCV RBC AUTO: 89 FL (ref 79–97)
MICROALBUMIN/CREAT UR: NORMAL MG/G{CREAT}
MONOCYTES # BLD AUTO: 0.57 10*3/MM3 (ref 0.1–0.9)
MONOCYTES NFR BLD AUTO: 6 % (ref 5–12)
NEUTROPHILS NFR BLD AUTO: 5.57 10*3/MM3 (ref 1.7–7)
NEUTROPHILS NFR BLD AUTO: 58.2 % (ref 42.7–76)
NITRITE UR QL STRIP: NEGATIVE
NRBC BLD AUTO-RTO: 0 /100 WBC (ref 0–0.2)
PH UR STRIP.AUTO: 5.5 [PH] (ref 5–8)
PLATELET # BLD AUTO: 254 10*3/MM3 (ref 140–450)
PMV BLD AUTO: 11.3 FL (ref 6–12)
POTASSIUM SERPL-SCNC: 4.3 MMOL/L (ref 3.5–5.2)
PROT SERPL-MCNC: 7 G/DL (ref 6–8.5)
PROT UR QL STRIP: NEGATIVE
RBC # BLD AUTO: 4.73 10*6/MM3 (ref 3.77–5.28)
SODIUM SERPL-SCNC: 143 MMOL/L (ref 136–145)
SP GR UR STRIP: 1.02 (ref 1–1.03)
T4 FREE SERPL-MCNC: 1.04 NG/DL (ref 0.93–1.7)
TRIGL SERPL-MCNC: 311 MG/DL (ref 0–150)
TSH SERPL DL<=0.05 MIU/L-ACNC: 2.36 UIU/ML (ref 0.27–4.2)
UROBILINOGEN UR QL STRIP: NORMAL
VLDLC SERPL-MCNC: 55 MG/DL (ref 5–40)
WBC NRBC COR # BLD AUTO: 9.57 10*3/MM3 (ref 3.4–10.8)

## 2024-05-24 PROCEDURE — 84439 ASSAY OF FREE THYROXINE: CPT

## 2024-05-24 PROCEDURE — 36415 COLL VENOUS BLD VENIPUNCTURE: CPT

## 2024-05-24 PROCEDURE — 84443 ASSAY THYROID STIM HORMONE: CPT

## 2024-05-24 PROCEDURE — 83036 HEMOGLOBIN GLYCOSYLATED A1C: CPT

## 2024-05-24 PROCEDURE — 80053 COMPREHEN METABOLIC PANEL: CPT

## 2024-05-24 PROCEDURE — 82043 UR ALBUMIN QUANTITATIVE: CPT

## 2024-05-24 PROCEDURE — 80061 LIPID PANEL: CPT

## 2024-05-24 PROCEDURE — 85025 COMPLETE CBC W/AUTO DIFF WBC: CPT

## 2024-05-24 PROCEDURE — 81003 URINALYSIS AUTO W/O SCOPE: CPT

## 2024-05-24 PROCEDURE — 82570 ASSAY OF URINE CREATININE: CPT

## 2024-05-30 ENCOUNTER — HOSPITAL ENCOUNTER (OUTPATIENT)
Dept: CARDIOLOGY | Facility: HOSPITAL | Age: 79
Discharge: HOME | End: 2024-05-30
Admitting: PHYSICIAN ASSISTANT
Payer: MEDICARE

## 2024-05-30 VITALS
WEIGHT: 165 LBS | BODY MASS INDEX: 26.52 KG/M2 | HEIGHT: 66 IN | HEART RATE: 80 BPM | SYSTOLIC BLOOD PRESSURE: 189 MMHG | DIASTOLIC BLOOD PRESSURE: 76 MMHG

## 2024-05-30 DIAGNOSIS — R07.9 CHEST PAIN, UNSPECIFIED TYPE: ICD-10-CM

## 2024-05-30 LAB
BH CV ECHO MEAS - EDV(MOD-SP2): 61.7 ML
BH CV ECHO MEAS - EDV(MOD-SP4): 74.2 ML
BH CV ECHO MEAS - EF(MOD-SP2): 53.4 %
BH CV ECHO MEAS - EF(MOD-SP4): 49.6 %
BH CV ECHO MEAS - ESV(MOD-SP2): 28.7 ML
BH CV ECHO MEAS - ESV(MOD-SP4): 37.4 ML
BH CV ECHO MEAS - SV(MOD-SP2): 32.9 ML
BH CV ECHO MEAS - SV(MOD-SP4): 36.8 ML
BH CV STRESS BP STAGE 1: NORMAL
BH CV STRESS BP STAGE 2: NORMAL
BH CV STRESS DURATION MIN STAGE 1: 3
BH CV STRESS DURATION MIN STAGE 2: 2
BH CV STRESS DURATION SEC STAGE 1: 0
BH CV STRESS DURATION SEC STAGE 2: 0
BH CV STRESS GRADE STAGE 1: 10
BH CV STRESS GRADE STAGE 2: 12
BH CV STRESS HR STAGE 1: 108
BH CV STRESS HR STAGE 2: 126
BH CV STRESS METS STAGE 1: 5
BH CV STRESS METS STAGE 2: 7.5
BH CV STRESS PROTOCOL 1: NORMAL
BH CV STRESS RECOVERY BP: NORMAL MMHG
BH CV STRESS RECOVERY HR: 84 BPM
BH CV STRESS SPEED STAGE 1: 1.7
BH CV STRESS SPEED STAGE 2: 2.5
BH CV STRESS STAGE 1: 1
BH CV STRESS STAGE 2: 2
MAXIMAL PREDICTED HEART RATE: 144 BPM
PERCENT MAX PREDICTED HR: 87.5 %
STRESS BASELINE BP: NORMAL MMHG
STRESS BASELINE HR: 70 BPM
STRESS PERCENT HR: 103 %
STRESS POST ESTIMATED WORKLOAD: 7.5 METS
STRESS POST EXERCISE DUR MIN: 5 MIN
STRESS POST EXERCISE DUR SEC: 0 SEC
STRESS POST PEAK BP: NORMAL MMHG
STRESS POST PEAK HR: 126 BPM
STRESS TARGET HR: 122 BPM

## 2024-05-30 PROCEDURE — 25510000001 PERFLUTREN 6.52 MG/ML SUSPENSION: Performed by: HOSPITALIST

## 2024-05-30 PROCEDURE — 93350 STRESS TTE ONLY: CPT

## 2024-05-30 PROCEDURE — 93017 CV STRESS TEST TRACING ONLY: CPT

## 2024-05-30 RX ADMIN — PERFLUTREN 8.48 MG: 6.52 INJECTION, SUSPENSION INTRAVENOUS at 14:15

## 2024-06-06 ENCOUNTER — OFFICE VISIT (OUTPATIENT)
Dept: SURGERY | Facility: CLINIC | Age: 79
End: 2024-06-06
Payer: MEDICARE

## 2024-06-06 VITALS
BODY MASS INDEX: 25.17 KG/M2 | WEIGHT: 156.6 LBS | SYSTOLIC BLOOD PRESSURE: 138 MMHG | OXYGEN SATURATION: 94 % | DIASTOLIC BLOOD PRESSURE: 68 MMHG | HEART RATE: 69 BPM | HEIGHT: 66 IN

## 2024-06-06 DIAGNOSIS — N64.89 SEROMA OF BREAST: ICD-10-CM

## 2024-06-06 DIAGNOSIS — C50.912 MALIGNANT NEOPLASM OF LEFT FEMALE BREAST, UNSPECIFIED ESTROGEN RECEPTOR STATUS, UNSPECIFIED SITE OF BREAST: Primary | ICD-10-CM

## 2024-06-06 DIAGNOSIS — C50.912 LOBULAR CARCINOMA OF LEFT BREAST: ICD-10-CM

## 2024-06-06 DIAGNOSIS — D05.02 LOBULAR CARCINOMA IN SITU (LCIS) OF LEFT BREAST: ICD-10-CM

## 2024-06-06 NOTE — PROGRESS NOTES
OFFICE FOLLOW UP VISIT    Referring Provider: No ref. provider found  Primary Care Provider: Hung Nicole MD    Chief Complaint   Patient presents with    Follow-up     Patient here for breast seroma        Subjective .       HPI    Last seen April 17 of this year for follow-up regarding a recurrent seroma at her left mastectomy site, at which time 30 cc of serosanguineous fluid was aspirated.  Since then, she has not noticed any fluctuance of her left mastectomy site.    History:  Past Medical History:   Diagnosis Date    Arthritis     Bladder cancer     Bladder cancer    Breast cancer     Diabetes mellitus     GERD (gastroesophageal reflux disease)     Hard of hearing     Hx of excision of tumor of brain meninges     Hyperlipidemia     Hypertension     UTI (urinary tract infection)     Vertigo    ,   Past Surgical History:   Procedure Laterality Date    APPENDECTOMY      BRAIN TUMOR EXCISION      CHOLECYSTECTOMY      CYSTOSCOPY BLADDER BIOPSY N/A 7/25/2018    Procedure: CYSTOSCOPY BILATERAL RETROGRADE PYELOGRAM BLADDER BIOPSIES WITH FULGURATION;  Surgeon: Rj Tijerina MD;  Location:  [facility];  Service: Urology    CYSTOSCOPY BLADDER BIOPSY N/A 10/31/2018    Procedure: CYSTOSCOPY BLADDER BIOPSY WITH FULGURATION;  Surgeon: Rj Tijerina MD;  Location:  [facility];  Service: Urology    HYSTERECTOMY      MASTECTOMY W/ SENTINEL NODE BIOPSY Left 8/1/2023    Procedure: BREAST MASTECTOMY WITH MAG SEED GUIDED AND SENTINEL NODE BIOPSY;  Surgeon: Tonia Ledezma MD;  Location:  [facility];  Service: General;  Laterality: Left;    TOTAL KNEE ARTHROPLASTY Bilateral     TRUNK LESION/CYST EXCISION Left 3/19/2024    Procedure: EXCISION CYST excision of central aspect of the scar, excision of seroma and closure over a drain. PER TALIA LEFT CHEST WALL;  Surgeon: Julio Preston MD;  Location:  [facility];  Service: General;  Laterality: Left;   ,   Family History   Problem Relation Age of Onset    No Known  "Problems Father     Cancer Mother     Diabetes Mother     Heart disease Mother    ,   Social History     Tobacco Use    Smoking status: Never    Smokeless tobacco: Never   Vaping Use    Vaping status: Never Used   Substance Use Topics    Alcohol use: Not Currently     Comment: \"One drink a year\"    Drug use: No       Current Outpatient Medications:     Cetirizine HCl 10 MG capsule, Take 1 tablet by mouth Daily., Disp: , Rfl:     Coenzyme Q10 10 MG capsule, Take 400 mg by mouth Daily., Disp: , Rfl:     dexAMETHasone (DECADRON) 0.5 MG tablet, Take 1 tablet by mouth As Needed., Disp: , Rfl:     esomeprazole (nexIUM) 40 MG capsule, Take 1 capsule by mouth Every Morning Before Breakfast., Disp: , Rfl:     furosemide (Lasix) 20 MG tablet, Take 1 tablet by mouth Every Other Day., Disp: 10 tablet, Rfl: 1    glucosamine-chondroitin 500-400 MG capsule capsule, Take 1 capsule by mouth 2 (Two) Times a Day., Disp: , Rfl:     HYDROcodone-acetaminophen (NORCO) 7.5-325 MG per tablet, Take 1 tablet by mouth Every 4 (Four) Hours As Needed for Moderate Pain (Pain)., Disp: 20 tablet, Rfl: 0    HYDROcodone-acetaminophen (NORCO) 7.5-325 MG per tablet, Take 1 tablet by mouth Every 4 (Four) Hours As Needed for Moderate Pain for up to 20 doses., Disp: 20 tablet, Rfl: 0    insulin detemir (LEVEMIR) 100 UNIT/ML injection, Inject 48 Units under the skin into the appropriate area as directed Every Night., Disp: , Rfl:     insulin glargine (LANTUS, SEMGLEE) 100 UNIT/ML injection, Inject 26 Units under the skin into the appropriate area as directed Daily. Every night, Disp: , Rfl:     insulin lispro protamine-insulin lispro (humaLOG 50-50) (50-50) 100 UNIT/ML suspension injection, Inject 6 Units under the skin into the appropriate area as directed 3 (Three) Times a Day., Disp: , Rfl:     ketorolac (TORADOL) 10 MG tablet, Take 1 tablet by mouth Every 6 (Six) Hours As Needed for Moderate Pain for up to 10 doses., Disp: 10 tablet, Rfl: 1    " "Lactobacillus-Inulin ([facility] & [employer]) capsule, Take 1 tablet by mouth Daily., Disp: , Rfl:     meloxicam (MOBIC) 15 MG tablet, Take 1 tablet by mouth As Needed., Disp: , Rfl:     metoprolol succinate XL (TOPROL-XL) 100 MG 24 hr tablet, Take 0.5 tablets by mouth Every Night., Disp: , Rfl:     ondansetron (Zofran) 8 MG tablet, Take 0.5 tablets by mouth Every 8 (Eight) Hours As Needed for Nausea or Vomiting for up to 10 doses., Disp: 10 tablet, Rfl: 1    pantoprazole (PROTONIX) 40 MG EC tablet, Take 1 tablet by mouth Daily., Disp: , Rfl:     tamoxifen (NOLVADEX) 10 MG tablet, Take 1 tablet by mouth Daily., Disp: , Rfl:     Allergies:  Halothane, Atorvastatin, Other, Cortisone, Lisinopril, Metformin, Mometasone furoate, Sulfa antibiotics, Tramadol, Codeine, Cortisol manager [cholestatin], Dexlansoprazole, Levaquin [levofloxacin], and Phenytoin sodium extended      The following portions of the patient's history were reviewed and updated as appropriate: allergies, current medications, past family history, past medical history, past social history, past surgical history, and problem list.      Review of Systems  A comprehensive 14 point review of systems was performed and is negative unless otherwise noted      Objective   /68 (BP Location: Right arm, Patient Position: Sitting, Cuff Size: Adult)   Pulse 69   Ht 168.9 cm (66.5\")   Wt 71 kg (156 lb 9.6 oz)   SpO2 94%   BMI 24.90 kg/m²     BMI followup discussion/instruction with patient: continue with current weight loss program, educational material, nutrition counseling, and Information on healthy weight added to patient's after visit summary.      Physical Exam  Constitutional:       Appearance: Normal appearance. She is normal weight.      Comments:  , in no acute distress. Normal development, normal body habitus. Well nourished   HENT:      Head: Normocephalic and atraumatic.      Right Ear: External ear normal.      Left Ear: External ear " normal.      Ears:      Comments: Hearing intact     Nose: Nose normal.      Comments: Nares patent, no septal deviation, no drainage     Mouth/Throat:      Comments: Airway patent, dentition intact, mucus membranes moist  Eyes:      Extraocular Movements: Extraocular movements intact.      Conjunctiva/sclera: Conjunctivae normal.      Pupils: Pupils are equal, round, and reactive to light.      Comments: External eyelids grossly normal, vision intact, no scleral icterus   Neck:      Comments: Trachea midline  Cardiovascular:      Rate and Rhythm: Normal rate.      Comments: Normotensive, no JVD bilaterally  Pulmonary:      Effort: Pulmonary effort is normal.      Breath sounds: Normal breath sounds.      Comments: Normal respiratory rate  Abdominal:      General: Abdomen is flat.      Palpations: Abdomen is soft.      Comments: Non tender. No scars, hernias or masses.   Musculoskeletal:         General: Normal range of motion.      Cervical back: Normal range of motion.      Comments: Strength intact. Ambulating without difficulty. Absent of trauma   Skin:     General: Skin is warm and dry.      Comments: Skin color is consistent with ethnicity   Neurological:      General: No focal deficit present.      Mental Status: She is alert and oriented to person, place, and time.   Psychiatric:         Mood and Affect: Mood normal.         Behavior: Behavior normal.         Thought Content: Thought content normal.         Judgment: Judgment normal.      Comments: Patient understands disease process and has decision making capacity.          Results:  Labs:  I personally reviewed all pertinent labs. No recent labs  Imaging: I personally reviewed all pertinent imaging studies. No new imaging   Pathology:      Assessment & Plan   Diagnoses and all orders for this visit:    1. Malignant neoplasm of left female breast, unspecified estrogen receptor status, unspecified site of breast (Primary)    2. Seroma of breast    3.  Lobular carcinoma in situ (LCIS) of left breast    4. Lobular carcinoma of left breast      79-year-old female lobular carcinoma and LCIS of the left breast 10 months status post left mastectomy which was complicated by chronic seroma requiring scar excision and multiple needle aspiration.  She has no evidence of seroma recurrence at this time.  Left mastectomy site has healed well.  She reports that she has a mammogram scheduled for next month.  Reviewing her record, it does not appear that she has had a breast MRI.  Will order breast MRI and have her follow-up with Dr. Anderson for ongoing breast cancer surveillance.         Sachin Aggarwal MD, FACS  General Surgery  6/14/2024  11:39 CDT    Please note that portions of this note were completed with a voice recognition program.

## 2024-06-26 ENCOUNTER — OFFICE VISIT (OUTPATIENT)
Dept: CARDIOLOGY | Facility: CLINIC | Age: 79
End: 2024-06-26
Payer: MEDICARE

## 2024-06-26 VITALS
HEIGHT: 66 IN | SYSTOLIC BLOOD PRESSURE: 110 MMHG | HEART RATE: 69 BPM | BODY MASS INDEX: 25.55 KG/M2 | DIASTOLIC BLOOD PRESSURE: 70 MMHG | OXYGEN SATURATION: 98 % | WEIGHT: 159 LBS

## 2024-06-26 DIAGNOSIS — E78.5 HYPERLIPIDEMIA, UNSPECIFIED HYPERLIPIDEMIA TYPE: ICD-10-CM

## 2024-06-26 DIAGNOSIS — I10 ESSENTIAL HYPERTENSION: ICD-10-CM

## 2024-06-26 DIAGNOSIS — R07.9 CHEST PAIN, UNSPECIFIED TYPE: ICD-10-CM

## 2024-06-26 DIAGNOSIS — R06.09 DYSPNEA ON EXERTION: Primary | ICD-10-CM

## 2024-06-26 DIAGNOSIS — I25.118 CORONARY ARTERY DISEASE OF NATIVE ARTERY OF NATIVE HEART WITH STABLE ANGINA PECTORIS: ICD-10-CM

## 2024-06-26 PROCEDURE — 93000 ELECTROCARDIOGRAM COMPLETE: CPT | Performed by: HOSPITALIST

## 2024-06-26 PROCEDURE — 3074F SYST BP LT 130 MM HG: CPT | Performed by: HOSPITALIST

## 2024-06-26 PROCEDURE — 99204 OFFICE O/P NEW MOD 45 MIN: CPT | Performed by: HOSPITALIST

## 2024-06-26 PROCEDURE — 3078F DIAST BP <80 MM HG: CPT | Performed by: HOSPITALIST

## 2024-06-26 RX ORDER — ASPIRIN 81 MG/1
81 TABLET ORAL DAILY
Qty: 90 TABLET | Refills: 3 | Status: SHIPPED | OUTPATIENT
Start: 2024-06-26

## 2024-06-26 RX ORDER — PRAVASTATIN SODIUM 10 MG
5 TABLET ORAL DAILY
Qty: 15 TABLET | Refills: 11 | Status: SHIPPED | OUTPATIENT
Start: 2024-06-26

## 2024-06-26 RX ORDER — NITROGLYCERIN 0.4 MG/1
TABLET SUBLINGUAL
Qty: 100 TABLET | Refills: 11 | Status: SHIPPED | OUTPATIENT
Start: 2024-06-26

## 2024-06-26 NOTE — PROGRESS NOTES
Reason For Visit:  Abnormal stress    Subjective        Pam Clayton is a 79 y.o. female with the below pertinent PMH who is referred for abnormal stress test.    The patient saw her PCP in May who ordered a stress echo to evaluate chest pain and some dyspnea on exertion.  The stress test was abnormal, and she was referred to cardiology.    Today, the patient states that she had noticed occasional heaviness in her chest not consistently associated with exertion; she notices this more when she was under stress.  She does mention that she has had a lot of stress recently as her  passed earlier this year and she also was treated for breast cancer.  She did have a reassuring mammogram recently and states that her chest heaviness has improved since receiving that result.  She also has had some new dyspnea on exertion.  She denies palpitations, lightheadedness, orthopnea, and peripheral edema.    She is concerned about potential CAD.  She has a history of hyperlipidemia and states that she has been treated with atorvastatin, rosuvastatin, and simvastatin in the past but did not tolerate them due to muscle cramping; she cannot recall the doses that she was on of these medications.  She states that she typically has some difficulty with medication tolerance.    ROS: Pertinent findings are included above.      Cardiac Studies  Stress echo 5/30/2024: Intermediate risk study.  LVEF 50% with hypokinesis of the mid lateral wall.  Subtle hypokinesis of the lateral wall at peak stress in some views although overall improved contractility consistent with possible ischemic but viable myocardium.  Duke treadmill score 2.5 consistent with a moderate risk for ischemic heart disease due to exercise duration with <1 mm ST depressions in the inferolateral leads.  No significant ECG (meeting diagnostic criteria) or clinical evidence of ischemia.  Resting hypertension with hypertensive BP response to stress.    Pertinent  "PMH  Coronary artery disease  Hypertension  Hyperlipidemia  Type 2 diabetes mellitus  GERD  Bladder tumor  Left breast cancer     Pertinent past medical, surgical, family, and social history were reviewed and updated in the EMR.      Current Outpatient Medications:     Cetirizine HCl 10 MG capsule, Take 1 tablet by mouth Daily., Disp: , Rfl:     Coenzyme Q10 10 MG capsule, Take 400 mg by mouth Daily., Disp: , Rfl:     glucosamine-chondroitin 500-400 MG capsule capsule, Take 1 capsule by mouth 2 (Two) Times a Day., Disp: , Rfl:     HYDROcodone-acetaminophen (NORCO) 7.5-325 MG per tablet, Take 1 tablet by mouth Every 4 (Four) Hours As Needed for Moderate Pain (Pain)., Disp: 20 tablet, Rfl: 0    insulin glargine (LANTUS, SEMGLEE) 100 UNIT/ML injection, Inject 30 Units under the skin into the appropriate area as directed Daily. Every night, Disp: , Rfl:     meloxicam (MOBIC) 15 MG tablet, Take 1 tablet by mouth As Needed., Disp: , Rfl:     metoprolol succinate XL (TOPROL-XL) 100 MG 24 hr tablet, Take 0.5 tablets by mouth Every Night., Disp: , Rfl:     pantoprazole (PROTONIX) 40 MG EC tablet, Take 1 tablet by mouth Daily., Disp: , Rfl:     tamoxifen (NOLVADEX) 10 MG tablet, Take 1 tablet by mouth Daily., Disp: , Rfl:      Objective   Vital Signs:  /70   Pulse 69   Ht 167.6 cm (66\")   Wt 72.1 kg (159 lb)   SpO2 98%   BMI 25.66 kg/m²   Estimated body mass index is 25.66 kg/m² as calculated from the following:    Height as of this encounter: 167.6 cm (66\").    Weight as of this encounter: 72.1 kg (159 lb).      Constitutional:       Appearance: Healthy appearance. Not in distress.   Neck:      Vascular: JVD normal.   Pulmonary:      Effort: Pulmonary effort is normal.      Breath sounds: Normal breath sounds.   Cardiovascular:      Normal rate. Regular rhythm.      Murmurs: There is no murmur.      No gallop.  No click. No rub.   Edema:     Peripheral edema absent.   Abdominal:      General: There is no " distension.      Palpations: Abdomen is soft.      Tenderness: There is no abdominal tenderness.   Skin:     General: Skin is warm and dry.   Neurological:      Mental Status: Alert and oriented to person, place and time.        Result Review :  The following data was reviewed by: Tommie Braga MD on 06/26/2024:  CMP   CMP          3/18/2024    15:17 3/23/2024    16:38 5/24/2024    09:53   CMP   Glucose 234  157  146    BUN 15  14  13    Creatinine 0.71  0.79  0.87    EGFR 88.2  77.6  69.1    Sodium 135  140  143    Potassium 4.4  4.2  4.3    Chloride 99  102  105    Calcium 9.4  9.5  9.6    Total Protein  7.1  7.0    Albumin  4.2  4.3    Globulin  2.9  2.7    Total Bilirubin  0.3  0.4    Alkaline Phosphatase  96  90    AST (SGOT)  18  19    ALT (SGPT)  15  15    Albumin/Globulin Ratio  1.4  1.6    BUN/Creatinine Ratio 21.1  17.7  14.9    Anion Gap 11.0  12.0  12.0      CBC   CBC          3/18/2024    15:17 3/23/2024    16:38 5/24/2024    09:53   CBC   WBC 11.68  11.71  9.57    RBC 4.61  4.53  4.73    Hemoglobin 13.0  13.0  13.3    Hematocrit 40.2  39.3  42.1    MCV 87.2  86.8  89.0    MCH 28.2  28.7  28.1    MCHC 32.3  33.1  31.6    RDW 12.7  13.0  12.3    Platelets 236  260  254      Lipid Panel   Lipid Panel          3/6/2024    09:31 5/24/2024    09:53   Lipid Panel   Total Cholesterol 204  214    Triglycerides 200  311    HDL Cholesterol 42  38    VLDL Cholesterol 36  55    LDL Cholesterol  126  121    LDL/HDL Ratio 2.90  2.99             ECG 12 Lead    Date/Time: 6/26/2024 2:17 PM  Performed by: Tommie Braga MD    Authorized by: Tommie Braga MD  Comparison: compared with previous ECG from 9/29/2021  Comparison to previous ECG: PAC now present, nonspecific T waves have replaced T wave inversions in the anterior leads  Rhythm: sinus rhythm  Ectopy: atrial premature contractions  Rate: normal  BPM: 64  Conduction: conduction normal  QRS axis: normal  Other findings: low voltage  Other findings comments:  Nonspecific T wave abnormality    Clinical impression: abnormal EKG            Assessment and Plan   Diagnoses and all orders for this visit:    1. Dyspnea on exertion (Primary)  -     ECG 12 Lead    2. Chest pain, unspecified type    3. Coronary artery disease of native artery of native heart with stable angina pectoris    4. Essential hypertension    5. Hyperlipidemia, unspecified hyperlipidemia type    Other orders  -     aspirin 81 MG EC tablet; Take 1 tablet by mouth Daily.  Dispense: 90 tablet; Refill: 3  -     pravastatin (PRAVACHOL) 10 MG tablet; Take 0.5 tablets by mouth Daily.  Dispense: 15 tablet; Refill: 11  -     nitroglycerin (NITROSTAT) 0.4 MG SL tablet; 1 under the tongue as needed for angina, may repeat q5mins for up three doses  Dispense: 100 tablet; Refill: 11    We had an extensive discussion regarding her testing results.  She does have an abnormal stress.  Her prior CT chest from 3/23/2024 was reviewed by me in clinic and also was notable for coronary artery calcifications.  We discussed the role of medications for ASCVD event risk reduction.  We also did discuss the role of antianginal therapy.  Given her prior statin issues she is reluctant about trying 1 but is amenable to a low-dose as a trial; if she does not tolerate this I will plan to switch to Zetia for now and then consider additional lipid-lowering therapy pending clinical course.  She has tolerated aspirin in the past and is amenable to starting this.  She would like to avoid trying to many medications at the same time, so she is going to hold off on antianginal therapy adjustments.    - Start aspirin 81 mg daily  - Start pravastatin 5 mg daily  - Rx for sublingual nitro as needed and we did discuss indications for ED evaluation  - Continue Toprol-XL 50 mg daily    Follow Up   Return in about 4 weeks (around 7/24/2024).  Patient was given instructions and counseling regarding her condition or for health maintenance advice. Please  see specific information pulled into the AVS if appropriate.       EMR Dragon/Transcription disclaimer: Much of this encounter note is an electronic transcription/translation of spoken language to printed text. The electronic translation of spoken language may permit erroneous, or at times, nonsensical words or phrases to be inadvertently transcribed; although I have reviewed the note for such errors, some may still exist.

## 2024-06-27 ENCOUNTER — TELEPHONE (OUTPATIENT)
Dept: HEMATOLOGY | Age: 79
End: 2024-06-27

## 2024-06-27 NOTE — PROGRESS NOTES
MEDICAL ONCOLOGY PROGRESS NOTE    Pt Name: Jenna Oneal  MRN: [id number]  YOB: 1945  Date of evaluation: 7/1/2024    HISTORY OF PRESENT ILLNESS:    Reason for MD visit-surveillance follow-up  Patient has been diagnosed with stage I IDC of the left breast status post left breast mastectomy.  She is currently receiving endocrine therapy with tamoxifen 10mg daily due to poor tolerance.  She denies any new breast complaints.  She had a repeat diagnostic mammogram.    Diagnosis  Adrenal adenoma, 2018  Invasive lobular carcinoma, left upper breast, July 2023  Associated LCIS  Grade 1  [id number], stage IA  ER 62%, NM 66%, HER-2 0/negative, Ki67 5%    Treatment Summary  8/1/23 Left breast mastectomy with negative (0/4) SLNB by Dr Toshia Levy/[facility]  8/17/23 Initiate endocrine hormone therapy with Tamoxifen 10mg daily    Cancer History  Jenna Oneal was first seen by me on 8/17/2023.  Patient was referred for a diagnosis of stage I invasive bladder carcinoma of the left breast status post left mastectomy by Dr. Sampson at [facility].  6/13/23 CT abd/pelvis ([facility]): No urinary calculi. No enlarged lymph nodes. 3.4 x 2.9cm right adrenal gland mass has grown slightly since prior study done in 2018 that measured 2.6 x 2.4cm. Up to 40% of adrenal adenomas may grow.   6/19/23 Bilateral screening mammogram ([facility]): The right breast appears normal. There is a spiculated density within the mid upper left breast.  6/19/23 US left breast ([facility]): Within the left breast at the 1 o'clock position 2cm from the nipple is a spiculated hypoechoic mass with posterior acoustic shadowing measuring 6 x 5 x 7mm. No internal vascularity. There is adjacent 3mm oval, parallel, hypoechoic mass less than 1cm medial to this mass. Left axillary demonstrates morphologically normal appearing lymph nodes.   7/7/23 Breast, left 1:00, biopsy: Invasive lobular carcinoma, 10 mm in greatest dimension.

## 2024-06-27 NOTE — TELEPHONE ENCOUNTER
I called and reminded pt. of their appt. on 07/01/2024. Made pt. aware to arrive at appt. time & not lab appt. time. I also advised pt. to arrive no sooner than appt time. Pt voiced understanding and confirmed appt. date and time.

## 2024-07-01 ENCOUNTER — OFFICE VISIT (OUTPATIENT)
Dept: HEMATOLOGY | Age: 79
End: 2024-07-01
Payer: MEDICARE

## 2024-07-01 ENCOUNTER — HOSPITAL ENCOUNTER (OUTPATIENT)
Dept: INFUSION THERAPY | Age: 79
Discharge: HOME | End: 2024-07-01
Payer: MEDICARE

## 2024-07-01 VITALS
HEART RATE: 64 BPM | TEMPERATURE: 98.8 F | WEIGHT: 158 LBS | DIASTOLIC BLOOD PRESSURE: 80 MMHG | SYSTOLIC BLOOD PRESSURE: 118 MMHG | OXYGEN SATURATION: 98 % | HEIGHT: 66 IN | BODY MASS INDEX: 25.39 KG/M2

## 2024-07-01 DIAGNOSIS — Z79.810 ENCOUNTER FOR MONITORING TAMOXIFEN THERAPY: ICD-10-CM

## 2024-07-01 DIAGNOSIS — C50.912 LOBULAR CARCINOMA OF LEFT BREAST (HCC): ICD-10-CM

## 2024-07-01 DIAGNOSIS — Z17.0 LOBULAR CARCINOMA OF LEFT BREAST, STAGE 1, ESTROGEN RECEPTOR POSITIVE (HCC): Primary | ICD-10-CM

## 2024-07-01 DIAGNOSIS — Z51.81 ENCOUNTER FOR MONITORING TAMOXIFEN THERAPY: ICD-10-CM

## 2024-07-01 DIAGNOSIS — Z71.89 CARE PLAN DISCUSSED WITH PATIENT: ICD-10-CM

## 2024-07-01 DIAGNOSIS — C50.912 LOBULAR CARCINOMA OF LEFT BREAST, STAGE 1, ESTROGEN RECEPTOR POSITIVE (HCC): Primary | ICD-10-CM

## 2024-07-01 DIAGNOSIS — T38.6X5D ADVERSE EFFECT OF TAMOXIFEN, SUBSEQUENT ENCOUNTER: ICD-10-CM

## 2024-07-01 LAB
BASOPHILS # BLD: 0.11 K/UL (ref 0.01–0.08)
BASOPHILS NFR BLD: 1 % (ref 0.1–1.2)
EOSINOPHIL # BLD: 0.32 K/UL (ref 0.04–0.54)
EOSINOPHIL NFR BLD: 3 % (ref 0.7–7)
ERYTHROCYTE [DISTWIDTH] IN BLOOD BY AUTOMATED COUNT: 12.3 % (ref 11.7–14.4)
HCT VFR BLD AUTO: 41.7 % (ref 34.1–44.9)
HGB BLD-MCNC: 13.8 G/DL (ref 11.2–15.7)
LYMPHOCYTES # BLD: 3.73 K/UL (ref 1.18–3.74)
LYMPHOCYTES NFR BLD: 35.2 % (ref 19.3–53.1)
MCH RBC QN AUTO: 29 PG (ref 25.6–32.2)
MCHC RBC AUTO-ENTMCNC: 33.1 G/DL (ref 32.3–35.5)
MCV RBC AUTO: 87.6 FL (ref 79.4–94.8)
MONOCYTES # BLD: 0.53 K/UL (ref 0.24–0.82)
MONOCYTES NFR BLD: 5 % (ref 4.7–12.5)
NEUTROPHILS # BLD: 5.89 K/UL (ref 1.56–6.13)
NEUTS SEG NFR BLD: 55.5 % (ref 34–71.1)
PLATELET # BLD AUTO: 240 K/UL (ref 182–369)
PMV BLD AUTO: 11.3 FL (ref 7.4–10.4)
RBC # BLD AUTO: 4.76 M/UL (ref 3.93–5.22)
WBC # BLD AUTO: 10.61 K/UL (ref 3.98–10.04)

## 2024-07-01 PROCEDURE — G8427 DOCREV CUR MEDS BY ELIG CLIN: HCPCS | Performed by: INTERNAL MEDICINE

## 2024-07-01 PROCEDURE — 36415 COLL VENOUS BLD VENIPUNCTURE: CPT

## 2024-07-01 PROCEDURE — G8419 CALC BMI OUT NRM PARAM NOF/U: HCPCS | Performed by: INTERNAL MEDICINE

## 2024-07-01 PROCEDURE — 1123F ACP DISCUSS/DSCN MKR DOCD: CPT | Performed by: INTERNAL MEDICINE

## 2024-07-01 PROCEDURE — 85025 COMPLETE CBC W/AUTO DIFF WBC: CPT

## 2024-07-01 PROCEDURE — 99212 OFFICE O/P EST SF 10 MIN: CPT

## 2024-07-01 PROCEDURE — 1036F TOBACCO NON-USER: CPT | Performed by: INTERNAL MEDICINE

## 2024-07-01 PROCEDURE — 1090F PRES/ABSN URINE INCON ASSESS: CPT | Performed by: INTERNAL MEDICINE

## 2024-07-01 PROCEDURE — G2211 COMPLEX E/M VISIT ADD ON: HCPCS | Performed by: INTERNAL MEDICINE

## 2024-07-01 PROCEDURE — G8400 PT W/DXA NO RESULTS DOC: HCPCS | Performed by: INTERNAL MEDICINE

## 2024-07-01 PROCEDURE — 99213 OFFICE O/P EST LOW 20 MIN: CPT | Performed by: INTERNAL MEDICINE

## 2024-07-01 RX ORDER — PRAVASTATIN SODIUM 10 MG
5 TABLET ORAL DAILY
COMMUNITY
Start: 2024-06-26

## 2024-07-01 RX ORDER — FUROSEMIDE 20 MG/1
1 TABLET ORAL EVERY OTHER DAY
COMMUNITY

## 2024-07-01 RX ORDER — TAMOXIFEN CITRATE 10 MG/1
10 TABLET ORAL DAILY
Qty: 90 TABLET | Refills: 5 | Status: SHIPPED | OUTPATIENT
Start: 2024-07-01

## 2024-07-01 RX ORDER — ASPIRIN 81 MG/1
81 TABLET ORAL DAILY
COMMUNITY
Start: 2024-06-26

## 2024-07-08 NOTE — ADDENDUM NOTE
Encounter addended by: Rosalie Ladd on: 7/8/2024 1:04 PM   Actions taken: Charge Capture section accepted

## 2024-07-10 ENCOUNTER — TELEPHONE (OUTPATIENT)
Dept: CARDIOLOGY | Facility: CLINIC | Age: 79
End: 2024-07-10
Payer: MEDICARE

## 2024-07-10 NOTE — TELEPHONE ENCOUNTER
Pt called to let us know she has stopped taking her statin medication due to leg cramping but will continue the low dose aspirin.

## 2024-07-12 RX ORDER — EZETIMIBE 10 MG/1
10 TABLET ORAL DAILY
Qty: 90 TABLET | Refills: 3 | Status: SHIPPED | OUTPATIENT
Start: 2024-07-12

## 2024-07-30 ENCOUNTER — OFFICE VISIT (OUTPATIENT)
Dept: CARDIOLOGY | Facility: CLINIC | Age: 79
End: 2024-07-30
Payer: MEDICARE

## 2024-07-30 VITALS
WEIGHT: 156 LBS | SYSTOLIC BLOOD PRESSURE: 120 MMHG | RESPIRATION RATE: 18 BRPM | HEART RATE: 65 BPM | BODY MASS INDEX: 25.07 KG/M2 | OXYGEN SATURATION: 99 % | DIASTOLIC BLOOD PRESSURE: 78 MMHG | HEIGHT: 66 IN

## 2024-07-30 DIAGNOSIS — E78.5 HYPERLIPIDEMIA, UNSPECIFIED HYPERLIPIDEMIA TYPE: ICD-10-CM

## 2024-07-30 DIAGNOSIS — I10 ESSENTIAL HYPERTENSION: ICD-10-CM

## 2024-07-30 DIAGNOSIS — I50.9: ICD-10-CM

## 2024-07-30 DIAGNOSIS — I25.118 CORONARY ARTERY DISEASE OF NATIVE ARTERY OF NATIVE HEART WITH STABLE ANGINA PECTORIS: Primary | ICD-10-CM

## 2024-07-30 RX ORDER — LOSARTAN POTASSIUM 25 MG/1
25 TABLET ORAL DAILY
Qty: 30 TABLET | Refills: 11 | Status: SHIPPED | OUTPATIENT
Start: 2024-07-30

## 2024-07-30 NOTE — PROGRESS NOTES
Reason For Visit:  CAD    Subjective        Pam Clayton is a 79 y.o. female with the below pertinent PMH who presents for follow-up of CAD.    Pam Clayton was most recently seen by me in clinic for initial evaluation 6/26/2024 regarding abnormal stress test.  At that time she reported occasional chest heaviness not consistently associated with exertion and more noticeable when under stress.  She had increased stress due to her  passing away earlier in the year as well as recently being treated for breast cancer.  She did feel like her chest heaviness was already improving after recent reassuring mammogram.  She had previously not tolerated statins but was not sure that she had tried lower doses.  She was started on aspirin along with a trial of pravastatin 5 mg daily.  She did not tolerate the pravastatin due to leg cramps, so she was switched to Zetia 10 mg daily.  She had preferred to hold off on too many medication changes and therefore declined adding antianginal therapy for the time being; she was given sublingual nitroglycerin.    Today, the patient states that she has overall done better since her last visit.  She is tolerating aspirin and Zetia well.  She states that for the most part her chest heaviness and shortness of breath did improve, and she has not noticed significant symptoms as long as she limits her activity a little and does not do too much.  She does mention that she has been under a lot of stress in the last couple of days and has had a little more chest heaviness again.    ROS: Pertinent findings are included above.    Cardiac Studies  Stress echo 5/30/2024: Intermediate risk study.  LVEF 50% with hypokinesis of the mid lateral wall.  Subtle hypokinesis of the lateral wall at peak stress in some views although overall improved contractility consistent with possible ischemic but viable myocardium.  Duke treadmill score 2.5 consistent with a moderate risk for ischemic heart  "disease due to exercise duration with <1 mm ST depressions in the inferolateral leads.  No significant ECG (meeting diagnostic criteria) or clinical evidence of ischemia.  Resting hypertension with hypertensive BP response to stress.     Pertinent PMH  Coronary artery disease  Hypertension  Hyperlipidemia  Type 2 diabetes mellitus  GERD  Bladder tumor  Left breast cancer    Pertinent past medical, surgical, family, and social history were reviewed.      Current Outpatient Medications:     aspirin 81 MG EC tablet, Take 1 tablet by mouth Daily., Disp: 90 tablet, Rfl: 3    Cetirizine HCl 10 MG capsule, Take 1 tablet by mouth Daily., Disp: , Rfl:     Coenzyme Q10 10 MG capsule, Take 400 mg by mouth Daily., Disp: , Rfl:     ezetimibe (ZETIA) 10 MG tablet, Take 1 tablet by mouth Daily., Disp: 90 tablet, Rfl: 3    glucosamine-chondroitin 500-400 MG capsule capsule, Take 1 capsule by mouth 2 (Two) Times a Day., Disp: , Rfl:     HYDROcodone-acetaminophen (NORCO) 7.5-325 MG per tablet, Take 1 tablet by mouth Every 4 (Four) Hours As Needed for Moderate Pain (Pain)., Disp: 20 tablet, Rfl: 0    insulin glargine (LANTUS, SEMGLEE) 100 UNIT/ML injection, Inject 30 Units under the skin into the appropriate area as directed Daily. Every night, Disp: , Rfl:     meloxicam (MOBIC) 15 MG tablet, Take 1 tablet by mouth As Needed., Disp: , Rfl:     metoprolol succinate XL (TOPROL-XL) 100 MG 24 hr tablet, Take 0.5 tablets by mouth Every Night., Disp: , Rfl:     nitroglycerin (NITROSTAT) 0.4 MG SL tablet, 1 under the tongue as needed for angina, may repeat q5mins for up three doses, Disp: 100 tablet, Rfl: 11    pantoprazole (PROTONIX) 40 MG EC tablet, Take 1 tablet by mouth Daily., Disp: , Rfl:     tamoxifen (NOLVADEX) 10 MG tablet, Take 1 tablet by mouth Daily., Disp: , Rfl:      Objective   Vital Signs:  /78   Pulse 65   Resp 18   Ht 167.6 cm (66\")   Wt 70.8 kg (156 lb)   SpO2 99%   BMI 25.18 kg/m²   Estimated body mass index " "is 25.18 kg/m² as calculated from the following:    Height as of this encounter: 167.6 cm (66\").    Weight as of this encounter: 70.8 kg (156 lb).      Constitutional:       Appearance: Healthy appearance. Not in distress.   Neck:      Vascular: JVD normal.   Pulmonary:      Effort: Pulmonary effort is normal.      Breath sounds: Normal breath sounds.   Cardiovascular:      Normal rate. Regular rhythm.      Murmurs: There is no murmur.      No gallop.  No click. No rub.   Edema:     Peripheral edema absent.   Abdominal:      General: There is no distension.      Palpations: Abdomen is soft.      Tenderness: There is no abdominal tenderness.   Skin:     General: Skin is warm and dry.   Neurological:      Mental Status: Alert and oriented to person, place and time.        Result Review :             ECG 12 Lead    Date/Time: 7/30/2024 11:43 AM  Performed by: Tommie Braga MD    Authorized by: Tommie Braga MD  Comparison: compared with previous ECG from 6/26/2024  Comparison to previous ECG: PACs are longer present  Rhythm: sinus rhythm  Rate: normal  BPM: 65  Conduction: conduction normal  QRS axis: normal  Other findings: low voltage  Other findings comments: Nonspecific T wave abnormality  Clinical impression comment: Borderline ECG            Assessment and Plan   Diagnoses and all orders for this visit:    1. Coronary artery disease of native artery of native heart with stable angina pectoris (Primary)    2. Essential hypertension    3. Hyperlipidemia, unspecified hyperlipidemia type    4. ACC/AHA stage B congestive heart failure    Other orders  -     losartan (Cozaar) 25 MG tablet; Take 1 tablet by mouth Daily.  Dispense: 30 tablet; Refill: 11  -     ECG 12 Lead    -Overall doing better but still with some occasional chest heaviness and shortness of breath; it sounds like she is avoiding symptoms by limiting activity some.  Symptoms also seem to be triggered by emotional stress possibly related to more " elevated BP as she did have a hypertensive BP response during her prior stress test.  We discussed options for potentially adding antianginal therapy versus adding an ARB for her borderline LV function with regional wall motion abnormalities; she favored starting the ARB for now.  She does mention that with ACE inhibitor she previously had cough symptoms but no other serious reactions.  - Start losartan 25 mg daily  - Continue Toprol-XL 50 mg daily  - Continue Zetia 10 mg daily  - Continue aspirin 81 mg daily  - Consider additional antianginal therapy at follow-up or to replace losartan if she does not tolerate it sooner than follow-up    Follow Up   Return in about 3 weeks (around 8/20/2024).  Patient was given instructions and counseling regarding her condition or for health maintenance advice. Please see specific information pulled into the AVS if appropriate.       EMR Dragon/Transcription disclaimer: Much of this encounter note is an electronic transcription/translation of spoken language to printed text. The electronic translation of spoken language may permit erroneous, or at times, nonsensical words or phrases to be inadvertently transcribed; although I have reviewed the note for such errors, some may still exist.

## 2024-08-09 ENCOUNTER — TRANSCRIBE ORDERS (OUTPATIENT)
Dept: ADMINISTRATIVE | Facility: HOSPITAL | Age: 79
End: 2024-08-09
Payer: MEDICARE

## 2024-08-09 ENCOUNTER — HOSPITAL ENCOUNTER (OUTPATIENT)
Dept: GENERAL RADIOLOGY | Facility: HOSPITAL | Age: 79
Discharge: HOME | End: 2024-08-09
Payer: MEDICARE

## 2024-08-09 DIAGNOSIS — R10.9 ABDOMINAL PAIN, UNSPECIFIED ABDOMINAL LOCATION: ICD-10-CM

## 2024-08-09 DIAGNOSIS — R10.9 ABDOMINAL PAIN, UNSPECIFIED ABDOMINAL LOCATION: Primary | ICD-10-CM

## 2024-08-09 PROCEDURE — 74018 RADEX ABDOMEN 1 VIEW: CPT

## 2024-08-13 ENCOUNTER — TELEPHONE (OUTPATIENT)
Dept: CARDIOLOGY | Facility: CLINIC | Age: 79
End: 2024-08-13
Payer: MEDICARE

## 2024-08-13 NOTE — TELEPHONE ENCOUNTER
Pt called to let you know she has stopped taking ezetimibe (ZETIA) 10 MG tablet  due to cramping in feet and legs.

## 2024-08-20 ENCOUNTER — LAB (OUTPATIENT)
Dept: LAB | Facility: HOSPITAL | Age: 79
End: 2024-08-20
Payer: MEDICARE

## 2024-08-20 ENCOUNTER — OFFICE VISIT (OUTPATIENT)
Dept: CARDIOLOGY | Facility: CLINIC | Age: 79
End: 2024-08-20
Payer: MEDICARE

## 2024-08-20 ENCOUNTER — TRANSCRIBE ORDERS (OUTPATIENT)
Dept: ADMINISTRATIVE | Facility: HOSPITAL | Age: 79
End: 2024-08-20
Payer: MEDICARE

## 2024-08-20 VITALS
HEIGHT: 66 IN | SYSTOLIC BLOOD PRESSURE: 112 MMHG | RESPIRATION RATE: 18 BRPM | HEART RATE: 65 BPM | DIASTOLIC BLOOD PRESSURE: 68 MMHG | WEIGHT: 156 LBS | BODY MASS INDEX: 25.07 KG/M2 | OXYGEN SATURATION: 98 %

## 2024-08-20 DIAGNOSIS — E11.69 TYPE 2 DIABETES MELLITUS WITH OTHER SPECIFIED COMPLICATION, UNSPECIFIED WHETHER LONG TERM INSULIN USE: Primary | ICD-10-CM

## 2024-08-20 DIAGNOSIS — I10 ESSENTIAL HYPERTENSION: ICD-10-CM

## 2024-08-20 DIAGNOSIS — E11.69 TYPE 2 DIABETES MELLITUS WITH OTHER SPECIFIED COMPLICATION, UNSPECIFIED WHETHER LONG TERM INSULIN USE: ICD-10-CM

## 2024-08-20 DIAGNOSIS — E78.5 HYPERLIPIDEMIA, UNSPECIFIED HYPERLIPIDEMIA TYPE: ICD-10-CM

## 2024-08-20 DIAGNOSIS — I25.118 CORONARY ARTERY DISEASE OF NATIVE ARTERY OF NATIVE HEART WITH STABLE ANGINA PECTORIS: Primary | ICD-10-CM

## 2024-08-20 DIAGNOSIS — I50.9: ICD-10-CM

## 2024-08-20 LAB
ALBUMIN SERPL-MCNC: 4.3 G/DL (ref 3.5–5.2)
ALBUMIN/GLOB SERPL: 1.5 G/DL
ALP SERPL-CCNC: 97 U/L (ref 39–117)
ALT SERPL W P-5'-P-CCNC: 16 U/L (ref 1–33)
ANION GAP SERPL CALCULATED.3IONS-SCNC: 12 MMOL/L (ref 5–15)
AST SERPL-CCNC: 18 U/L (ref 1–32)
BILIRUB SERPL-MCNC: 0.5 MG/DL (ref 0–1.2)
BUN SERPL-MCNC: 15 MG/DL (ref 8–23)
BUN/CREAT SERPL: 19 (ref 7–25)
CALCIUM SPEC-SCNC: 9.7 MG/DL (ref 8.6–10.5)
CHLORIDE SERPL-SCNC: 103 MMOL/L (ref 98–107)
CO2 SERPL-SCNC: 25 MMOL/L (ref 22–29)
CREAT SERPL-MCNC: 0.79 MG/DL (ref 0.57–1)
EGFRCR SERPLBLD CKD-EPI 2021: 77.2 ML/MIN/1.73
GLOBULIN UR ELPH-MCNC: 2.8 GM/DL
GLUCOSE SERPL-MCNC: 157 MG/DL (ref 65–99)
HBA1C MFR BLD: 8.1 % (ref 4.8–5.6)
POTASSIUM SERPL-SCNC: 4.7 MMOL/L (ref 3.5–5.2)
PROT SERPL-MCNC: 7.1 G/DL (ref 6–8.5)
SODIUM SERPL-SCNC: 140 MMOL/L (ref 136–145)

## 2024-08-20 PROCEDURE — 36415 COLL VENOUS BLD VENIPUNCTURE: CPT

## 2024-08-20 PROCEDURE — 83036 HEMOGLOBIN GLYCOSYLATED A1C: CPT

## 2024-08-20 PROCEDURE — 80053 COMPREHEN METABOLIC PANEL: CPT

## 2024-08-20 RX ORDER — METOPROLOL SUCCINATE 50 MG/1
75 TABLET, EXTENDED RELEASE ORAL NIGHTLY
Qty: 135 TABLET | Refills: 3 | Status: SHIPPED | OUTPATIENT
Start: 2024-08-20

## 2024-08-20 RX ORDER — LOSARTAN POTASSIUM 25 MG/1
25 TABLET ORAL DAILY
Qty: 90 TABLET | Refills: 3 | Status: SHIPPED | OUTPATIENT
Start: 2024-08-20

## 2024-08-20 NOTE — PROGRESS NOTES
Reason For Visit:  CAD    Subjective        Pam Clayton is a 79 y.o. female with the below pertinent PMH who presents for follow-up of CAD.    Pam Clayton was most recently seen by me in clinic on 7/30/2024 at which time she reported doing better since her last visit and was tolerating aspirin and Zetia well.  She stated that her chest heaviness and shortness of breath did improve for the most part and she was not having significant symptoms as long as she limited her activity a little, but she did sometimes have more chest heaviness when under a lot of stress.  We discussed multiple options and ultimately started losartan 25 mg daily with plan to consider antianginal therapy at follow-up.  I did subsequently receive a message 8/13 that she had stopped Zetia due to cramping in her feet and legs.    Today, the patient reports that she has tolerated losartan well.  Her leg cramping did resolve after stopping Zetia.  She has not had much trouble with chest heaviness at all since her last visit.  She also has not noticed any dyspnea exertion although mentions that her activity level is somewhat limited.  She has some mild discomfort on the far right side of her chest that she experiences throughout the day without any exertional component and sometimes thinks this is worsened with certain movements.  She denies palpitations and lightheadedness.  She did have 1 episode when she was climbing on a stool and hanging up some close when she suddenly fell to the ground although she denies having had any lightheadedness or loss of consciousness with this.    ROS: Pertinent findings are included above.    Cardiac Studies  Stress echo 5/30/2024: Intermediate risk study.  LVEF 50% with hypokinesis of the mid lateral wall.  Subtle hypokinesis of the lateral wall at peak stress in some views although overall improved contractility consistent with possible ischemic but viable myocardium.  [facility] treadmill score 2.5 consistent  with a moderate risk for ischemic heart disease due to exercise duration with <1 mm ST depressions in the inferolateral leads.  No significant ECG (meeting diagnostic criteria) or clinical evidence of ischemia.  Resting hypertension with hypertensive BP response to stress.     Pertinent PMH  Coronary artery disease  Hypertension  Hyperlipidemia  Type 2 diabetes mellitus  GERD  Bladder tumor  Left breast cancer    Pertinent past medical, surgical, family, and social history were reviewed.      Current Outpatient Medications:     aspirin 81 MG EC tablet, Take 1 tablet by mouth Daily., Disp: 90 tablet, Rfl: 3    Cetirizine HCl 10 MG capsule, Take 1 tablet by mouth Daily., Disp: , Rfl:     Coenzyme Q10 10 MG capsule, Take 400 mg by mouth Daily., Disp: , Rfl:     ezetimibe (ZETIA) 10 MG tablet, Take 1 tablet by mouth Daily., Disp: 90 tablet, Rfl: 3    glucosamine-chondroitin 500-400 MG capsule capsule, Take 1 capsule by mouth 2 (Two) Times a Day., Disp: , Rfl:     HYDROcodone-acetaminophen (NORCO) 7.5-325 MG per tablet, Take 1 tablet by mouth Every 4 (Four) Hours As Needed for Moderate Pain (Pain)., Disp: 20 tablet, Rfl: 0    insulin glargine (LANTUS, SEMGLEE) 100 UNIT/ML injection, Inject 30 Units under the skin into the appropriate area as directed Daily. Every night, Disp: , Rfl:     losartan (Cozaar) 25 MG tablet, Take 1 tablet by mouth Daily., Disp: 30 tablet, Rfl: 11    meloxicam (MOBIC) 15 MG tablet, Take 1 tablet by mouth As Needed., Disp: , Rfl:     metoprolol succinate XL (TOPROL-XL) 100 MG 24 hr tablet, Take 0.5 tablets by mouth Every Night., Disp: , Rfl:     nitroglycerin (NITROSTAT) 0.4 MG SL tablet, 1 under the tongue as needed for angina, may repeat q5mins for up three doses, Disp: 100 tablet, Rfl: 11    pantoprazole (PROTONIX) 40 MG EC tablet, Take 1 tablet by mouth Daily., Disp: , Rfl:     tamoxifen (NOLVADEX) 10 MG tablet, Take 1 tablet by mouth Daily., Disp: , Rfl:      Objective   Vital Signs:  BP  "112/68   Pulse 65   Resp 18   Ht 167.6 cm (66\")   Wt 70.8 kg (156 lb)   SpO2 98%   BMI 25.18 kg/m²   Estimated body mass index is 25.18 kg/m² as calculated from the following:    Height as of this encounter: 167.6 cm (66\").    Weight as of this encounter: 70.8 kg (156 lb).      Constitutional:       Appearance: Healthy appearance. Not in distress.   Neck:      Vascular: JVD normal.   Pulmonary:      Effort: Pulmonary effort is normal.      Breath sounds: Normal breath sounds.   Cardiovascular:      Normal rate. Regular rhythm.      Murmurs: There is no murmur.      No gallop.  No click. No rub.   Edema:     Peripheral edema absent.   Abdominal:      General: There is no distension.      Palpations: Abdomen is soft.      Tenderness: There is no abdominal tenderness.   Skin:     General: Skin is warm and dry.   Neurological:      Mental Status: Alert and oriented to person, place and time.        Result Review :             ECG 12 Lead    Date/Time: 8/20/2024 10:01 AM  Performed by: Tommie Braga MD    Authorized by: Tommie Braga MD  Comparison: compared with previous ECG from 7/30/2024  Similar to previous ECG  Rhythm: sinus rhythm  Rate: normal  BPM: 65  Conduction: conduction normal  QRS axis: normal  Other findings: low voltage  Other findings comments: Nonspecific T wave abnormality  Clinical impression comment: Borderline ECG            Assessment and Plan   Diagnoses and all orders for this visit:    1. Coronary artery disease of native artery of native heart with stable angina pectoris (Primary)    2. Essential hypertension    3. ACC/AHA stage B congestive heart failure    4. Hyperlipidemia, unspecified hyperlipidemia type    Other orders  -     metoprolol succinate XL (TOPROL-XL) 50 MG 24 hr tablet; Take 1.5 tablets by mouth Every Night.  Dispense: 135 tablet; Refill: 3  -     ECG 12 Lead    - Symptomatically appears to be doing reasonably well.  She denies any clear anginal symptoms at present and " reports that her prior chest heaviness is better as her stress level has improved.  I do still think it is reasonable to titrate up metoprolol for antianginal therapy (she mentions that she has been on an increased dose in the past and tolerated it well).  - Increase Toprol-XL to 75 mg nightly  - Continue losartan 25 mg daily  - Continue aspirin 81 mg daily  - She has been intolerant to statins and Zetia; we have discussed alternative lipid-lowering therapy, but she ultimately has had concerns about these given prior intolerances with cholesterol medications as well as numerous other medications.  Plan to further discuss potential PCSK9 inhibitor or Leqvio therapy at follow-up after she takes more time to think about it.  - She is about to leave for [place] and plans to follow-up with me when she returns.  I did strongly encourage her to find a cardiologist while down in [place] as she intends to be there until June.  I will schedule her for follow-up in 9-10 months for when she returns.  - I had planned to obtain lab work today, but she states that she is getting a CMP and A1c checked for Dr. Nicole today so I will request the results of these.    Follow Up   Return in about 9 months (around 5/20/2025).  Patient was given instructions and counseling regarding her condition or for health maintenance advice. Please see specific information pulled into the AVS if appropriate.       EMR Dragon/Transcription disclaimer: Much of this encounter note is an electronic transcription/translation of spoken language to printed text. The electronic translation of spoken language may permit erroneous, or at times, nonsensical words or phrases to be inadvertently transcribed; although I have reviewed the note for such errors, some may still exist.

## 2024-09-09 ENCOUNTER — OFFICE VISIT (OUTPATIENT)
Dept: SURGERY | Facility: CLINIC | Age: 79
End: 2024-09-09
Payer: MEDICARE

## 2024-09-09 VITALS
BODY MASS INDEX: 25.23 KG/M2 | HEIGHT: 66 IN | WEIGHT: 157 LBS | DIASTOLIC BLOOD PRESSURE: 80 MMHG | SYSTOLIC BLOOD PRESSURE: 128 MMHG

## 2024-09-09 DIAGNOSIS — E66.3 OVERWEIGHT (BMI 25.0-29.9): ICD-10-CM

## 2024-09-09 DIAGNOSIS — Z85.3 PERSONAL HISTORY OF BREAST CANCER: Primary | ICD-10-CM

## 2024-09-09 PROCEDURE — 99213 OFFICE O/P EST LOW 20 MIN: CPT | Performed by: STUDENT IN AN ORGANIZED HEALTH CARE EDUCATION/TRAINING PROGRAM

## 2024-09-09 PROCEDURE — 3074F SYST BP LT 130 MM HG: CPT | Performed by: STUDENT IN AN ORGANIZED HEALTH CARE EDUCATION/TRAINING PROGRAM

## 2024-09-09 PROCEDURE — 3079F DIAST BP 80-89 MM HG: CPT | Performed by: STUDENT IN AN ORGANIZED HEALTH CARE EDUCATION/TRAINING PROGRAM

## 2024-09-09 PROCEDURE — 1159F MED LIST DOCD IN RCRD: CPT | Performed by: STUDENT IN AN ORGANIZED HEALTH CARE EDUCATION/TRAINING PROGRAM

## 2024-09-09 PROCEDURE — 1160F RVW MEDS BY RX/DR IN RCRD: CPT | Performed by: STUDENT IN AN ORGANIZED HEALTH CARE EDUCATION/TRAINING PROGRAM

## 2024-09-09 NOTE — PROGRESS NOTES
Office Established Patient Note:     Referring Provider: No ref. provider found    Chief Complaint   Patient presents with    Follow-up     Patient is here for a follow for breast       Subjective .     History of present illness:   History of Present Illness  Ms. Clayton is s/p left mastectomy for breast cancer with a subsequent drainage of a seroma. She had multiple seroma aspirations in her post op course as well. She reports experiencing sharp pain in the area where her nipple was previously located. Her last mammogram was conducted in June 2024.       History  Past Medical History:   Diagnosis Date    Arthritis     Bladder cancer     Bladder cancer    Breast cancer     Diabetes mellitus     GERD (gastroesophageal reflux disease)     Hard of hearing     Hx of excision of tumor of brain meninges     Hyperlipidemia     Hypertension     UTI (urinary tract infection)     Vertigo    ,   Past Surgical History:   Procedure Laterality Date    APPENDECTOMY      BRAIN TUMOR EXCISION      CHOLECYSTECTOMY      CYSTOSCOPY BLADDER BIOPSY N/A 7/25/2018    Procedure: CYSTOSCOPY BILATERAL RETROGRADE PYELOGRAM BLADDER BIOPSIES WITH FULGURATION;  Surgeon: Rj Tijerina MD;  Location:  [facility];  Service: Urology    CYSTOSCOPY BLADDER BIOPSY N/A 10/31/2018    Procedure: CYSTOSCOPY BLADDER BIOPSY WITH FULGURATION;  Surgeon: Rj Tijerina MD;  Location:  [facility];  Service: Urology    HYSTERECTOMY      MASTECTOMY W/ SENTINEL NODE BIOPSY Left 8/1/2023    Procedure: BREAST MASTECTOMY WITH MAG SEED GUIDED AND SENTINEL NODE BIOPSY;  Surgeon: Tonia Ledezma MD;  Location:  [facility];  Service: General;  Laterality: Left;    TOTAL KNEE ARTHROPLASTY Bilateral     TRUNK LESION/CYST EXCISION Left 3/19/2024    Procedure: EXCISION CYST excision of central aspect of the scar, excision of seroma and closure over a drain. PER TALIA LEFT CHEST WALL;  Surgeon: Julio Preston MD;  Location:  [facility];  Service: General;  Laterality:  "Left;   ,   Family History   Problem Relation Age of Onset    No Known Problems Father     Cancer Mother     Diabetes Mother     Heart disease Mother    ,   Social History     Tobacco Use    Smoking status: Never    Smokeless tobacco: Never   Vaping Use    Vaping status: Never Used   Substance Use Topics    Alcohol use: Not Currently     Comment: \"One drink a year\"    Drug use: Never   , (Not in a hospital admission)   and Allergies:  Halothane, Atorvastatin, Other, Cortisone, Lisinopril, Metformin, Mometasone furoate, Statins, Sulfa antibiotics, Tramadol, Zetia [ezetimibe], Codeine, Cortisol manager [cholestatin], Dexlansoprazole, Levaquin [levofloxacin], and Phenytoin sodium extended    Current Outpatient Medications:     aspirin 81 MG EC tablet, Take 1 tablet by mouth Daily., Disp: 90 tablet, Rfl: 3    Cetirizine HCl 10 MG capsule, Take 1 tablet by mouth Daily., Disp: , Rfl:     Coenzyme Q10 10 MG capsule, Take 400 mg by mouth Daily., Disp: , Rfl:     glucosamine-chondroitin 500-400 MG capsule capsule, Take 1 capsule by mouth 2 (Two) Times a Day., Disp: , Rfl:     HYDROcodone-acetaminophen (NORCO) 7.5-325 MG per tablet, Take 1 tablet by mouth Every 4 (Four) Hours As Needed for Moderate Pain (Pain)., Disp: 20 tablet, Rfl: 0    insulin glargine (LANTUS, SEMGLEE) 100 UNIT/ML injection, Inject 30 Units under the skin into the appropriate area as directed Daily. Every night, Disp: , Rfl:     losartan (Cozaar) 25 MG tablet, Take 1 tablet by mouth Daily., Disp: 90 tablet, Rfl: 3    meloxicam (MOBIC) 15 MG tablet, Take 1 tablet by mouth As Needed., Disp: , Rfl:     metoprolol succinate XL (TOPROL-XL) 50 MG 24 hr tablet, Take 1.5 tablets by mouth Every Night., Disp: 135 tablet, Rfl: 3    nitroglycerin (NITROSTAT) 0.4 MG SL tablet, 1 under the tongue as needed for angina, may repeat q5mins for up three doses, Disp: 100 tablet, Rfl: 11    pantoprazole (PROTONIX) 40 MG EC tablet, Take 1 tablet by mouth Daily., Disp: , Rfl: " "    tamoxifen (NOLVADEX) 10 MG tablet, Take 1 tablet by mouth Daily., Disp: , Rfl:       Objective     Vital Signs   /80 (BP Location: Right arm, Patient Position: Sitting, Cuff Size: Adult)   Ht 167.6 cm (65.98\")   Wt 71.2 kg (157 lb)   BMI 25.35 kg/m²      Physical Exam:  General appearance - alert, well appearing, and in no distress  Mental status - alert, oriented to person, place, and time  Eyes - pupils equal and reactive, extraocular eye movements intact  Neck - supple, no significant adenopathy  Chest - no tachypnea, retractions or cyanosis  Heart - normal rate and regular rhythm  Abdomen - soft, nontender, nondistended, no masses or organomegaly  Neurological - alert, oriented, normal speech, no focal findings or movement disorder noted  Breast Exam: right breast without obvious deformities. Right  nipple everted. Patient examined in the supine position with the ipsilateral arm above the head.Left mastectomy bed with well healed incision; no seroma. No palpable axillary nor supraclavicular adenopathy bilaterally.   Physical Exam         Results Review:     The following data was reviewed by: Angela Anderson MD on 09/09/2024:  I reviewed the notes from Dr. Holland in care everywhere.   Op Note by Julio Preston MD (03/19/2024 10:16)   Op Note by Tonia Ledezma MD (08/01/2023 16:53)   Progress Notes by Sachin Aggarwal MD (06/06/2024 11:00)   Progress Notes by Julio Preston MD (04/17/2024 10:45)   Results         Assessment & Plan       Diagnoses and all orders for this visit:    1. Personal history of breast cancer (Primary)    2. Overweight (BMI 25.0-29.9)       Assessment & Plan  1. Postoperative Follow-up.  She reports a history of multiple fluid drains and a secondary surgery to address fluid buildup post-mastectomy. Currently, there is no fluid buildup detected. She experiences sharp, sporadic pain at the mastectomy site, likely due to nerve regrowth, which is a common postoperative " symptom. She is advised to manage the pain with strong ibuprofen and avoid aggravating the area. A breast exam will be conducted annually for a period of 5 years, as per standard care guidelines. She will call to schedule her next appointment upon returning from [place].           Angela Anderson MD  09/11/24  16:14 CDT    Patient or patient representative verbalized consent for the use of Ambient Listening during the visit with  Angela Anderson MD for chart documentation. 9/11/2024  16:14 CDT

## 2024-09-11 NOTE — PATIENT INSTRUCTIONS

## 2025-03-28 ENCOUNTER — TELEPHONE (OUTPATIENT)
Dept: HEMATOLOGY | Age: 80
End: 2025-03-28

## 2025-03-28 NOTE — TELEPHONE ENCOUNTER
Returned patients phone call. I sent [facility] the order for Mammogram. I informed patient that she should receive a call from [facility] to get that scheduled. Patient has follow up scheduled with MD on 6/30/25 at 1045 patient notified of appt date and time. Electronically signed by Sarah Palafox RN on 3/28/2025 at 4:17 PM

## 2025-04-21 ENCOUNTER — TRANSCRIBE ORDERS (OUTPATIENT)
Dept: ADMINISTRATIVE | Facility: HOSPITAL | Age: 80
End: 2025-04-21
Payer: MEDICARE

## 2025-04-21 DIAGNOSIS — E04.9 NONTOXIC GOITER: ICD-10-CM

## 2025-04-21 DIAGNOSIS — E11.65 TYPE 2 DIABETES MELLITUS WITH HYPERGLYCEMIA, UNSPECIFIED WHETHER LONG TERM INSULIN USE: ICD-10-CM

## 2025-04-21 DIAGNOSIS — N32.81 OVERACTIVE BLADDER: Primary | ICD-10-CM

## 2025-04-21 DIAGNOSIS — I10 ESSENTIAL HYPERTENSION: ICD-10-CM

## 2025-05-27 ENCOUNTER — LAB (OUTPATIENT)
Dept: LAB | Facility: HOSPITAL | Age: 80
End: 2025-05-27
Payer: MEDICARE

## 2025-05-27 DIAGNOSIS — E11.65 TYPE 2 DIABETES MELLITUS WITH HYPERGLYCEMIA, UNSPECIFIED WHETHER LONG TERM INSULIN USE: ICD-10-CM

## 2025-05-27 DIAGNOSIS — E04.9 NONTOXIC GOITER: ICD-10-CM

## 2025-05-27 DIAGNOSIS — I10 ESSENTIAL HYPERTENSION: ICD-10-CM

## 2025-05-27 LAB
ALBUMIN SERPL-MCNC: 4.2 G/DL (ref 3.5–5.2)
ALBUMIN UR-MCNC: <1.2 MG/DL
ALBUMIN/GLOB SERPL: 1.6 G/DL
ALP SERPL-CCNC: 91 U/L (ref 39–117)
ALT SERPL W P-5'-P-CCNC: 16 U/L (ref 1–33)
ANION GAP SERPL CALCULATED.3IONS-SCNC: 11 MMOL/L (ref 5–15)
AST SERPL-CCNC: 18 U/L (ref 1–32)
BACTERIA UR QL AUTO: ABNORMAL /HPF
BASOPHILS # BLD AUTO: 0.13 10*3/MM3 (ref 0–0.2)
BASOPHILS NFR BLD AUTO: 1.3 % (ref 0–1.5)
BILIRUB SERPL-MCNC: 0.5 MG/DL (ref 0–1.2)
BILIRUB UR QL STRIP: NEGATIVE
BUN SERPL-MCNC: 10 MG/DL (ref 8–23)
BUN/CREAT SERPL: 12.3 (ref 7–25)
CALCIUM SPEC-SCNC: 9.3 MG/DL (ref 8.6–10.5)
CHLORIDE SERPL-SCNC: 107 MMOL/L (ref 98–107)
CHOLEST SERPL-MCNC: 190 MG/DL (ref 0–200)
CLARITY UR: CLEAR
CO2 SERPL-SCNC: 25 MMOL/L (ref 22–29)
COLOR UR: YELLOW
CREAT SERPL-MCNC: 0.81 MG/DL (ref 0.57–1)
CREAT UR-MCNC: 162.2 MG/DL
DEPRECATED RDW RBC AUTO: 42 FL (ref 37–54)
EGFRCR SERPLBLD CKD-EPI 2021: 74.9 ML/MIN/1.73
EOSINOPHIL # BLD AUTO: 0.49 10*3/MM3 (ref 0–0.4)
EOSINOPHIL NFR BLD AUTO: 4.9 % (ref 0.3–6.2)
ERYTHROCYTE [DISTWIDTH] IN BLOOD BY AUTOMATED COUNT: 13 % (ref 12.3–15.4)
GLOBULIN UR ELPH-MCNC: 2.6 GM/DL
GLUCOSE SERPL-MCNC: 110 MG/DL (ref 65–99)
GLUCOSE UR STRIP-MCNC: NEGATIVE MG/DL
HBA1C MFR BLD: 8.3 % (ref 4.8–5.6)
HCT VFR BLD AUTO: 40.1 % (ref 34–46.6)
HDLC SERPL-MCNC: 40 MG/DL (ref 40–60)
HGB BLD-MCNC: 12.8 G/DL (ref 12–15.9)
HGB UR QL STRIP.AUTO: NEGATIVE
HYALINE CASTS UR QL AUTO: ABNORMAL /LPF
IMM GRANULOCYTES # BLD AUTO: 0.03 10*3/MM3 (ref 0–0.05)
IMM GRANULOCYTES NFR BLD AUTO: 0.3 % (ref 0–0.5)
KETONES UR QL STRIP: ABNORMAL
LDLC SERPL CALC-MCNC: 104 MG/DL (ref 0–100)
LDLC/HDLC SERPL: 2.4 {RATIO}
LEUKOCYTE ESTERASE UR QL STRIP.AUTO: ABNORMAL
LYMPHOCYTES # BLD AUTO: 3.41 10*3/MM3 (ref 0.7–3.1)
LYMPHOCYTES NFR BLD AUTO: 34.3 % (ref 19.6–45.3)
MCH RBC QN AUTO: 28.3 PG (ref 26.6–33)
MCHC RBC AUTO-ENTMCNC: 31.9 G/DL (ref 31.5–35.7)
MCV RBC AUTO: 88.5 FL (ref 79–97)
MICROALBUMIN/CREAT UR: NORMAL MG/G{CREAT}
MONOCYTES # BLD AUTO: 0.54 10*3/MM3 (ref 0.1–0.9)
MONOCYTES NFR BLD AUTO: 5.4 % (ref 5–12)
NEUTROPHILS NFR BLD AUTO: 5.35 10*3/MM3 (ref 1.7–7)
NEUTROPHILS NFR BLD AUTO: 53.8 % (ref 42.7–76)
NITRITE UR QL STRIP: NEGATIVE
NRBC BLD AUTO-RTO: 0 /100 WBC (ref 0–0.2)
PH UR STRIP.AUTO: 5.5 [PH] (ref 5–8)
PLATELET # BLD AUTO: 282 10*3/MM3 (ref 140–450)
PMV BLD AUTO: 11.5 FL (ref 6–12)
POTASSIUM SERPL-SCNC: 3.9 MMOL/L (ref 3.5–5.2)
PROT SERPL-MCNC: 6.8 G/DL (ref 6–8.5)
PROT UR QL STRIP: NEGATIVE
RBC # BLD AUTO: 4.53 10*6/MM3 (ref 3.77–5.28)
RBC # UR STRIP: ABNORMAL /HPF
REF LAB TEST METHOD: ABNORMAL
SODIUM SERPL-SCNC: 143 MMOL/L (ref 136–145)
SP GR UR STRIP: 1.02 (ref 1–1.03)
SQUAMOUS #/AREA URNS HPF: ABNORMAL /HPF
T4 FREE SERPL-MCNC: 1.03 NG/DL (ref 0.92–1.68)
TRIGL SERPL-MCNC: 271 MG/DL (ref 0–150)
TSH SERPL DL<=0.05 MIU/L-ACNC: 3.14 UIU/ML (ref 0.27–4.2)
UROBILINOGEN UR QL STRIP: ABNORMAL
VLDLC SERPL-MCNC: 46 MG/DL (ref 5–40)
WBC # UR STRIP: ABNORMAL /HPF
WBC NRBC COR # BLD AUTO: 9.95 10*3/MM3 (ref 3.4–10.8)

## 2025-05-27 PROCEDURE — 84439 ASSAY OF FREE THYROXINE: CPT

## 2025-05-27 PROCEDURE — 83036 HEMOGLOBIN GLYCOSYLATED A1C: CPT | Performed by: INTERNAL MEDICINE

## 2025-05-27 PROCEDURE — 85025 COMPLETE CBC W/AUTO DIFF WBC: CPT | Performed by: INTERNAL MEDICINE

## 2025-05-27 PROCEDURE — 36415 COLL VENOUS BLD VENIPUNCTURE: CPT

## 2025-05-27 PROCEDURE — 80053 COMPREHEN METABOLIC PANEL: CPT

## 2025-05-27 PROCEDURE — 82043 UR ALBUMIN QUANTITATIVE: CPT

## 2025-05-27 PROCEDURE — 82570 ASSAY OF URINE CREATININE: CPT

## 2025-05-27 PROCEDURE — 81001 URINALYSIS AUTO W/SCOPE: CPT | Performed by: INTERNAL MEDICINE

## 2025-05-27 PROCEDURE — 84443 ASSAY THYROID STIM HORMONE: CPT

## 2025-05-27 PROCEDURE — 87086 URINE CULTURE/COLONY COUNT: CPT | Performed by: INTERNAL MEDICINE

## 2025-05-27 PROCEDURE — 80061 LIPID PANEL: CPT | Performed by: INTERNAL MEDICINE

## 2025-05-28 LAB — BACTERIA SPEC AEROBE CULT: NORMAL

## 2025-06-25 ENCOUNTER — TELEPHONE (OUTPATIENT)
Dept: HEMATOLOGY | Age: 80
End: 2025-06-25

## 2025-06-25 NOTE — TELEPHONE ENCOUNTER

## 2025-06-25 NOTE — PROGRESS NOTES
MEDICAL ONCOLOGY PROGRESS NOTE    Pt Name: Jenna Oneal  MRN: [id number]  YOB: 1945  Date of evaluation: 6/30/2025    HISTORY OF PRESENT ILLNESS:    Reason for MD visit-surveillance follow-up.  History of Present Illness  The patient is a 80-year-old female who presents today for further follow-up of a history of invasive lobular carcinoma of the breast, currently on adjuvant endocrine therapy with tamoxifen. She has a history of adrenal adenoma and a remote history of stage 0 bladder cancer.    She reports no new symptoms related to her breast cancer. However, she experiences discomfort from her prosthetic, which leads her to avoid wearing a bra as frequently as recommended. She is currently on tamoxifen 10 mg daily and has not missed any doses. She has expressed a desire to continue this medication beyond the standard 5-year period.    Regarding her history of stage 0 bladder cancer, she reports no current bleeding or blood in her urine. She does not have a regular urologist but has previously seen Dr. Guillory at [facility], who discontinued her BCG treatments due to an adverse reaction.    Diagnosis  Adrenal adenoma, 2018  Invasive lobular carcinoma, left upper breast, July 2023  Associated LCIS  Grade 1  [id number], stage IA  ER 62%, ME 66%, HER-2 0/negative, Ki67 5%    Treatment Summary  8/1/23 Left breast mastectomy with negative (0/4) SLNB by Dr Toshia Levy/[facility]  8/17/23 Initiate endocrine hormone therapy with Tamoxifen 10mg daily    Cancer History  Jenna Oneal was first seen by me on 8/17/2023.  Patient was referred for a diagnosis of stage I invasive bladder carcinoma of the left breast status post left mastectomy by Dr. Sampson at [facility].  6/13/23 CT abd/pelvis ([facility]): No urinary calculi. No enlarged lymph nodes. 3.4 x 2.9cm right adrenal gland mass has grown slightly since prior study done in 2018 that measured 2.6 x 2.4cm. Up to 40% of adrenal adenomas may grow.

## 2025-06-30 ENCOUNTER — HOSPITAL ENCOUNTER (OUTPATIENT)
Dept: INFUSION THERAPY | Age: 80
Discharge: HOME | End: 2025-06-30
Payer: MEDICARE

## 2025-06-30 ENCOUNTER — OFFICE VISIT (OUTPATIENT)
Dept: HEMATOLOGY | Age: 80
End: 2025-06-30
Payer: MEDICARE

## 2025-06-30 VITALS
WEIGHT: 161.2 LBS | HEIGHT: 66 IN | SYSTOLIC BLOOD PRESSURE: 122 MMHG | OXYGEN SATURATION: 99 % | TEMPERATURE: 97.9 F | HEART RATE: 69 BPM | BODY MASS INDEX: 25.91 KG/M2 | DIASTOLIC BLOOD PRESSURE: 76 MMHG

## 2025-06-30 DIAGNOSIS — Z51.81 ENCOUNTER FOR MONITORING TAMOXIFEN THERAPY: ICD-10-CM

## 2025-06-30 DIAGNOSIS — T38.6X5D ADVERSE EFFECT OF TAMOXIFEN, SUBSEQUENT ENCOUNTER: ICD-10-CM

## 2025-06-30 DIAGNOSIS — C50.912 LOBULAR CARCINOMA OF LEFT BREAST, STAGE 1, ESTROGEN RECEPTOR POSITIVE (HCC): ICD-10-CM

## 2025-06-30 DIAGNOSIS — C50.212 MALIGNANT NEOPLASM OF UPPER-INNER QUADRANT OF LEFT BREAST IN FEMALE, ESTROGEN RECEPTOR POSITIVE (HCC): ICD-10-CM

## 2025-06-30 DIAGNOSIS — Z17.0 MALIGNANT NEOPLASM OF UPPER-INNER QUADRANT OF LEFT BREAST IN FEMALE, ESTROGEN RECEPTOR POSITIVE (HCC): ICD-10-CM

## 2025-06-30 DIAGNOSIS — Z12.31 ENCOUNTER FOR SCREENING MAMMOGRAM FOR BREAST CANCER: ICD-10-CM

## 2025-06-30 DIAGNOSIS — Z17.0 LOBULAR CARCINOMA OF LEFT BREAST, STAGE 1, ESTROGEN RECEPTOR POSITIVE (HCC): ICD-10-CM

## 2025-06-30 DIAGNOSIS — Z71.89 CARE PLAN DISCUSSED WITH PATIENT: ICD-10-CM

## 2025-06-30 DIAGNOSIS — Z79.810 ENCOUNTER FOR MONITORING TAMOXIFEN THERAPY: ICD-10-CM

## 2025-06-30 PROCEDURE — 1123F ACP DISCUSS/DSCN MKR DOCD: CPT | Performed by: INTERNAL MEDICINE

## 2025-06-30 PROCEDURE — 1036F TOBACCO NON-USER: CPT | Performed by: INTERNAL MEDICINE

## 2025-06-30 PROCEDURE — 1090F PRES/ABSN URINE INCON ASSESS: CPT | Performed by: INTERNAL MEDICINE

## 2025-06-30 PROCEDURE — 1159F MED LIST DOCD IN RCRD: CPT | Performed by: INTERNAL MEDICINE

## 2025-06-30 PROCEDURE — G8419 CALC BMI OUT NRM PARAM NOF/U: HCPCS | Performed by: INTERNAL MEDICINE

## 2025-06-30 PROCEDURE — 99213 OFFICE O/P EST LOW 20 MIN: CPT | Performed by: INTERNAL MEDICINE

## 2025-06-30 PROCEDURE — G8427 DOCREV CUR MEDS BY ELIG CLIN: HCPCS | Performed by: INTERNAL MEDICINE

## 2025-06-30 PROCEDURE — G2211 COMPLEX E/M VISIT ADD ON: HCPCS | Performed by: INTERNAL MEDICINE

## 2025-06-30 PROCEDURE — G8400 PT W/DXA NO RESULTS DOC: HCPCS | Performed by: INTERNAL MEDICINE

## 2025-06-30 PROCEDURE — 99212 OFFICE O/P EST SF 10 MIN: CPT

## 2025-06-30 PROCEDURE — 1126F AMNT PAIN NOTED NONE PRSNT: CPT | Performed by: INTERNAL MEDICINE

## 2025-06-30 RX ORDER — TAMOXIFEN CITRATE 10 MG/1
10 TABLET ORAL DAILY
Qty: 90 TABLET | Refills: 5 | Status: SHIPPED | OUTPATIENT
Start: 2025-06-30

## 2025-07-24 ENCOUNTER — TELEPHONE (OUTPATIENT)
Dept: CARDIOLOGY | Facility: CLINIC | Age: 80
End: 2025-07-24
Payer: MEDICARE

## 2025-07-24 NOTE — TELEPHONE ENCOUNTER
DR. [facility]ILTON'S OFFICE NEEDS LAST OV, AND ANY CARDIAC TEST FAXED TO THEM AT [phone].  PT IS SCHEDULED FOR A PROCEDURE ON 7/29/25.  I FAXED WHAT THEY REQUESTED.  Nabeel Jeong, CMA

## 2025-08-22 ENCOUNTER — LAB (OUTPATIENT)
Dept: LAB | Facility: HOSPITAL | Age: 80
End: 2025-08-22
Payer: MEDICARE

## 2025-08-22 ENCOUNTER — TRANSCRIBE ORDERS (OUTPATIENT)
Dept: ADMINISTRATIVE | Facility: HOSPITAL | Age: 80
End: 2025-08-22
Payer: MEDICARE

## 2025-08-22 DIAGNOSIS — E11.65 TYPE 2 DIABETES MELLITUS WITH HYPERGLYCEMIA, UNSPECIFIED WHETHER LONG TERM INSULIN USE: Primary | ICD-10-CM

## (undated) DEVICE — SHEET, T, LAPAROTOMY, STERILE: Brand: MEDLINE

## (undated) DEVICE — DRSNG SURESITE WNDW 4X4.5

## (undated) DEVICE — 3M™ IOBAN™ 2 ANTIMICROBIAL INCISE DRAPE 6650EZ: Brand: IOBAN™ 2

## (undated) DEVICE — 4-PORT MANIFOLD: Brand: NEPTUNE 2

## (undated) DEVICE — ADHS LIQ MASTISOL 2/3ML

## (undated) DEVICE — ELECTRD BLD EZ CLN MOD XLNG 2.75IN

## (undated) DEVICE — TRAP FLD MINIVAC MEGADYNE 100ML

## (undated) DEVICE — SHEET,DRAPE,53X77,STERILE: Brand: MEDLINE

## (undated) DEVICE — PAD MINOR UNIVERSAL: Brand: MEDLINE INDUSTRIES, INC.

## (undated) DEVICE — ANTIBACTERIAL UNDYED BRAIDED (POLYGLACTIN 910), SYNTHETIC ABSORBABLE SUTURE: Brand: COATED VICRYL

## (undated) DEVICE — TOWEL,OR,DSP,ST,BLUE,STD,4/PK,20PK/CS: Brand: MEDLINE

## (undated) DEVICE — GLV SURG BIOGEL M LTX PF 7 1/2

## (undated) DEVICE — GAUZE,SPONGE,4"X4",12PLY,STERILE,LF,2'S: Brand: MEDLINE

## (undated) DEVICE — SUT SILK 2/0 SUTUPAK TIES 24IN SA75H

## (undated) DEVICE — CATH URETRL OPN/END 5F70CM

## (undated) DEVICE — VAGINAL PREP TRAY: Brand: MEDLINE INDUSTRIES, INC.

## (undated) DEVICE — SUT SILK 3/0 SUTUPAK TIES 24IN SA74H

## (undated) DEVICE — SUT VIC 3/0 RB1 27IN UD VCP215H

## (undated) DEVICE — GW SENSR DUALFLEX NITNL STR .038IN 3X150CM

## (undated) DEVICE — SUT VIC 4/0 P3 18IN UD VCP494H

## (undated) DEVICE — DRN WND HUBLSS FLUT FULL PERF SIL10MM

## (undated) DEVICE — PK CYSTO 30

## (undated) DEVICE — STERILE (14 X 61CM) FLAT-FOLDED COVER: Brand: CIV-FLEX™ TRANSDUCER COVER

## (undated) DEVICE — GAUZE,SPONGE,FLUFF,6"X6.75",STRL,10/TRAY: Brand: MEDLINE

## (undated) DEVICE — ENDOSCOPIC SEAL URO 1 SIZE FITS ALL: Brand: ENDOSCOPIC SEAL

## (undated) DEVICE — APPL CHLORAPREP HI/LITE 26ML ORNG

## (undated) DEVICE — CLTH CLENS READYCLEANSE PERI CARE PK/5

## (undated) DEVICE — MAJOR DOUBLE BASIN W/GOWNS II: Brand: MEDLINE INDUSTRIES, INC.

## (undated) DEVICE — SUT VIC 3/0 SUTUPAK TIES 18IN J910T

## (undated) DEVICE — STRIP CLS WND CURAD MEDI/STRIP HYPOALLERG 0.25X4IN PK/10

## (undated) DEVICE — PK TURNOVER CYSTO RM

## (undated) DEVICE — BAPTIST TURNOVER KIT: Brand: MEDLINE INDUSTRIES, INC.

## (undated) DEVICE — RESERVOIR,SUCTION,100CC,SILICONE: Brand: MEDLINE